# Patient Record
Sex: FEMALE | Race: WHITE | NOT HISPANIC OR LATINO | Employment: OTHER | URBAN - METROPOLITAN AREA
[De-identification: names, ages, dates, MRNs, and addresses within clinical notes are randomized per-mention and may not be internally consistent; named-entity substitution may affect disease eponyms.]

---

## 2017-06-05 ENCOUNTER — COMMUNICATION - HEALTHEAST (OUTPATIENT)
Dept: HEALTH INFORMATION MANAGEMENT | Facility: CLINIC | Age: 67
End: 2017-06-05

## 2017-08-01 ENCOUNTER — TRANSFERRED RECORDS (OUTPATIENT)
Dept: HEALTH INFORMATION MANAGEMENT | Facility: CLINIC | Age: 67
End: 2017-08-01

## 2017-12-06 ENCOUNTER — TRANSFERRED RECORDS (OUTPATIENT)
Dept: HEALTH INFORMATION MANAGEMENT | Facility: CLINIC | Age: 67
End: 2017-12-06

## 2018-04-23 ENCOUNTER — TRANSFERRED RECORDS (OUTPATIENT)
Dept: HEALTH INFORMATION MANAGEMENT | Facility: CLINIC | Age: 68
End: 2018-04-23

## 2018-06-14 LAB
ALT SERPL-CCNC: 16 IU/L (ref 8–45)
AST SERPL-CCNC: 19 IU/L (ref 2–40)

## 2018-08-20 ENCOUNTER — TRANSFERRED RECORDS (OUTPATIENT)
Dept: MULTI SPECIALTY CLINIC | Facility: CLINIC | Age: 68
End: 2018-08-20

## 2018-08-20 LAB
CHOLEST SERPL-MCNC: 278 MG/DL (ref 100–199)
CREAT SERPL-MCNC: 0.82 MG/DL (ref 0.57–1.11)
GFR SERPL CREATININE-BSD FRML MDRD: >60 ML/MIN/1.73M2
GLUCOSE SERPL-MCNC: 92 MG/DL (ref 65–100)
HDLC SERPL-MCNC: 71 MG/DL
LDLC SERPL CALC-MCNC: 185 MG/DL
NONHDLC SERPL-MCNC: 207 MG/DL
POTASSIUM SERPL-SCNC: 3.8 MMOL/L (ref 3.5–5)
TRIGL SERPL-MCNC: 110 MG/DL

## 2018-12-10 ENCOUNTER — TRANSFERRED RECORDS (OUTPATIENT)
Dept: HEALTH INFORMATION MANAGEMENT | Facility: CLINIC | Age: 68
End: 2018-12-10

## 2019-01-14 ENCOUNTER — TRANSFERRED RECORDS (OUTPATIENT)
Dept: HEALTH INFORMATION MANAGEMENT | Facility: CLINIC | Age: 69
End: 2019-01-14

## 2019-09-10 ENCOUNTER — OFFICE VISIT (OUTPATIENT)
Dept: FAMILY MEDICINE | Facility: CLINIC | Age: 69
End: 2019-09-10
Payer: MEDICARE

## 2019-09-10 ENCOUNTER — TELEPHONE (OUTPATIENT)
Dept: INTERNAL MEDICINE | Facility: CLINIC | Age: 69
End: 2019-09-10

## 2019-09-10 VITALS
WEIGHT: 224 LBS | HEIGHT: 65 IN | DIASTOLIC BLOOD PRESSURE: 79 MMHG | BODY MASS INDEX: 37.32 KG/M2 | OXYGEN SATURATION: 98 % | RESPIRATION RATE: 12 BRPM | TEMPERATURE: 97.5 F | SYSTOLIC BLOOD PRESSURE: 148 MMHG | HEART RATE: 70 BPM

## 2019-09-10 DIAGNOSIS — Z00.00 ENCOUNTER FOR MEDICARE ANNUAL WELLNESS EXAM: Primary | ICD-10-CM

## 2019-09-10 DIAGNOSIS — I10 ESSENTIAL HYPERTENSION: ICD-10-CM

## 2019-09-10 DIAGNOSIS — E78.5 HYPERLIPIDEMIA LDL GOAL <130: ICD-10-CM

## 2019-09-10 DIAGNOSIS — E66.01 MORBID OBESITY (H): ICD-10-CM

## 2019-09-10 PROBLEM — G60.8 PERIPHERAL SENSORY NEUROPATHY: Status: ACTIVE | Noted: 2017-06-28

## 2019-09-10 PROBLEM — E66.9 NON MORBID OBESITY: Status: ACTIVE | Noted: 2017-06-28

## 2019-09-10 PROBLEM — E66.9 NON MORBID OBESITY: Status: RESOLVED | Noted: 2017-06-28 | Resolved: 2019-09-10

## 2019-09-10 LAB
ANION GAP SERPL CALCULATED.3IONS-SCNC: 6 MMOL/L (ref 3–14)
BUN SERPL-MCNC: 15 MG/DL (ref 7–30)
CALCIUM SERPL-MCNC: 8.7 MG/DL (ref 8.5–10.1)
CHLORIDE SERPL-SCNC: 107 MMOL/L (ref 94–109)
CHOLEST SERPL-MCNC: 261 MG/DL
CO2 SERPL-SCNC: 28 MMOL/L (ref 20–32)
CREAT SERPL-MCNC: 0.78 MG/DL (ref 0.52–1.04)
GFR SERPL CREATININE-BSD FRML MDRD: 78 ML/MIN/{1.73_M2}
GLUCOSE SERPL-MCNC: 89 MG/DL (ref 70–99)
HDLC SERPL-MCNC: 82 MG/DL
LDLC SERPL CALC-MCNC: 158 MG/DL
NONHDLC SERPL-MCNC: 179 MG/DL
POTASSIUM SERPL-SCNC: 3.6 MMOL/L (ref 3.4–5.3)
SODIUM SERPL-SCNC: 141 MMOL/L (ref 133–144)
TRIGL SERPL-MCNC: 104 MG/DL

## 2019-09-10 PROCEDURE — 80061 LIPID PANEL: CPT | Performed by: INTERNAL MEDICINE

## 2019-09-10 PROCEDURE — 99397 PER PM REEVAL EST PAT 65+ YR: CPT | Performed by: INTERNAL MEDICINE

## 2019-09-10 PROCEDURE — 99213 OFFICE O/P EST LOW 20 MIN: CPT | Mod: 25 | Performed by: INTERNAL MEDICINE

## 2019-09-10 PROCEDURE — 80048 BASIC METABOLIC PNL TOTAL CA: CPT | Performed by: INTERNAL MEDICINE

## 2019-09-10 PROCEDURE — 36415 COLL VENOUS BLD VENIPUNCTURE: CPT | Performed by: INTERNAL MEDICINE

## 2019-09-10 RX ORDER — LOSARTAN POTASSIUM AND HYDROCHLOROTHIAZIDE 12.5; 5 MG/1; MG/1
1 TABLET ORAL DAILY
Qty: 90 TABLET | Refills: 3 | Status: SHIPPED | OUTPATIENT
Start: 2019-09-10 | End: 2020-05-21

## 2019-09-10 RX ORDER — LOSARTAN POTASSIUM AND HYDROCHLOROTHIAZIDE 12.5; 5 MG/1; MG/1
1 TABLET ORAL DAILY
COMMUNITY
Start: 2018-08-20 | End: 2019-09-10

## 2019-09-10 RX ORDER — ROSUVASTATIN CALCIUM 5 MG/1
5 TABLET, COATED ORAL DAILY
Qty: 90 TABLET | Refills: 3 | Status: SHIPPED | OUTPATIENT
Start: 2019-09-10 | End: 2020-08-14

## 2019-09-10 RX ORDER — CHOLECALCIFEROL (VITAMIN D3) 50 MCG
2000 TABLET ORAL DAILY
COMMUNITY

## 2019-09-10 SDOH — HEALTH STABILITY: MENTAL HEALTH: HOW OFTEN DO YOU HAVE A DRINK CONTAINING ALCOHOL?: NEVER

## 2019-09-10 ASSESSMENT — ACTIVITIES OF DAILY LIVING (ADL): CURRENT_FUNCTION: NO ASSISTANCE NEEDED

## 2019-09-10 ASSESSMENT — MIFFLIN-ST. JEOR: SCORE: 1552.55

## 2019-09-10 NOTE — PATIENT INSTRUCTIONS
1. Blood work today  2. Monitor blood pressure at home.  If remains elevated, may need to increase the med.  3. Recommend to start crestor 5 mg once daily    Ideal blood pressure is consistently less than 140.  Best way is take 2+ hours after you have taken your blood pressure medication, and not while drinking coffee, alcohol, using advil, sudafed, etc.  These can raise your blood pressure.   Sit quietly for several min with feet flat on ground.  With arm at heart level, take blood pressure, then again in 1 min.  Average the 2 readings and record this.  Can come back to see RN for blood pressure check.  Blood Pressure Log    Patient Education   Personalized Prevention Plan  You are due for the preventive services outlined below.  Your care team is available to assist you in scheduling these services.  If you have already completed any of these items, please share that information with your care team to update in your medical record.  Health Maintenance Due   Topic Date Due     Hepatitis C Screening  1950     Colonscopy  06/16/1961     Cholesterol Lab  06/16/1996     Annual Wellness Visit  06/16/2016     FALL RISK ASSESSMENT  06/16/2016     Diptheria Tetanus Pertussis (DTAP/TDAP/TD) Vaccine (2 - Td) 12/17/2018     PHQ-2  01/01/2019     Flu Vaccine (1) 09/01/2019

## 2019-09-10 NOTE — TELEPHONE ENCOUNTER
Please abstract the following data from this visit with this patient into the appropriate field in Epic:    Tests that can be patient reported without a hard copy:    Colonoscopy done on this date: 3/16/2015 (approximately), by this group: [facility], results were Care-Everywhere.  and Mammogram done on this date: 12/10/2018 (approximately), by this group: [facility], results were see care-everywhere.     Other Tests found in the patient's chart through Chart Review/Care Everywhere:    LDL done by this group [facility] on this date: 8/20/2018    Note to Abstraction: If this section is blank, no results were found via Chart Review/Care Everywhere.

## 2019-09-10 NOTE — PROGRESS NOTES
"Subjective     Kelly Mckeon is a 69 year old female who presents to clinic today for the following health issues:      Annual Wellness Visit    Are you in the first 12 months of your Medicare Part B coverage?  No      Mental Health:    In general, how would you rate your overall mental or emotional health? good  PHQ-2 Score:      Do you feel safe in your environment? Yes    Do you have a Health Care Directive? Yes: Patient states has Advance Directive and will bring in a copy to clinic.    Fall risk:  Fall Risk Assessment not completed.    Cognitive Screenin) Repeat 3 items (Leader, Season, Table)    2) Clock draw: NORMAL  3) 3 item recall: Recalls 3 objects  Results: 3 items recalled: COGNITIVE IMPAIRMENT LESS LIKELY    Mini-CogTM Copyright S Nicholas. Licensed by the author for use in [employer]; reprinted with permission (estelle@[facility]). All rights reserved.      Current providers sharing in care for this patient include:   Patient Care Team:  Ayleen Castro, DO as PCP - General (Internal Medicine)        Do you have sleep apnea, excessive snoring or daytime drowsiness?: no    Healthy Habits:    In general, how would you rate your overall health?  Very good    Frequency of exercise:  2-3 days/week    Duration of exercise:  30-45 minutes    Do you usually eat at least 4 servings of fruit and vegetables a day, include whole grains    & fiber and avoid regularly eating high fat or \"junk\" foods?  Yes    Taking medications regularly:  Yes    Barriers to taking medications:  None    Medication side effects:  None    Ability to successfully perform activities of daily living:  No assistance needed    Home Safety:  No safety concerns identified    Hearing Impairment:  No hearing concerns    In the past 6 months, have you been bothered by leaking of urine?  No    In general, how would you rate your overall mental or emotional health?  Excellent      PHQ-2 Total Score:    Additional concerns " "today:  No       Refill:Losartan     HL: 10 year risk of CV is 13.8%.  , , HDL 71, .  Intolerant to pravastatin.    Quality: Mammogram 2018, Colonoscopy 2015 See care-Everywhere    Cancers: Never              Current Outpatient Medications   Medication Sig Dispense Refill     losartan-hydrochlorothiazide (HYZAAR) 50-12.5 MG tablet Take 1 tablet by mouth daily 90 tablet 3     rosuvastatin (CRESTOR) 5 MG tablet Take 1 tablet (5 mg) by mouth daily 90 tablet 3     vitamin D3 (CHOLECALCIFEROL) 2000 units (50 mcg) tablet Take 2,000 Units by mouth daily           Reviewed and updated as needed this visit by Provider         Review of Systems   ROS COMP: Constitutional, HEENT, cardiovascular, pulmonary, GI, , musculoskeletal, neuro, skin, endocrine and psych systems are negative, except as otherwise noted.      Objective    BP (!) 146/90   Pulse 70   Temp 97.5  F (36.4  C) (Tympanic)   Resp 12   Ht 1.66 m (5' 5.35\")   Wt 101.6 kg (224 lb)   SpO2 98%   Breastfeeding? No   BMI 36.87 kg/m    Body mass index is 36.87 kg/m .  Physical Exam   GENERAL: healthy, alert and no distress  EYES: Eyes grossly normal to inspection, PERRL and conjunctivae and sclerae normal  HENT: ear canals and TM's normal, nose and mouth without ulcers or lesions  NECK: no adenopathy, no asymmetry, masses, or scars and thyroid normal to palpation  RESP: lungs clear to auscultation - no rales, rhonchi or wheezes  CV: regular rate and rhythm, normal S1 S2, no S3 or S4, no murmur, click or rub, no peripheral edema and peripheral pulses strong  ABDOMEN: soft, nontender, no hepatosplenomegaly, no masses and bowel sounds normal  MS: no gross musculoskeletal defects noted, no edema  SKIN: no suspicious lesions or rashes  NEURO: Normal strength and tone, mentation intact and speech normal  PSYCH: mentation appears normal, affect normal/bright          Assessment & Plan     1. Encounter for Medicare annual wellness exam    2. " Hyperlipidemia LDL goal <130 -did a 10-year CV risk calculator and her risk is elevated.  Recommended a statin.  She is agreeable to try a different low-dose statin.  Advised to continue working on diet and exercise which she plans to do.  - Lipid panel reflex to direct LDL Fasting  - rosuvastatin (CRESTOR) 5 MG tablet; Take 1 tablet (5 mg) by mouth daily  Dispense: 90 tablet; Refill: 3  - OFFICE/OUTPT VISIT,EST,LEVL III    3. Essential hypertension-not well controlled.  She reports it is better controlled at home.  Advised to monitor at home, instructions given.  RN visit in 2 to 4 weeks.  If blood pressure remains uncontrolled.  Would increase the dose of her current blood pressure medicine.  - losartan-hydrochlorothiazide (HYZAAR) 50-12.5 MG tablet; Take 1 tablet by mouth daily  Dispense: 90 tablet; Refill: 3  - Basic metabolic panel  - OFFICE/OUTPT VISIT,EST,LEVL III    4. Morbid obesity (H) -discussed diet exercise          Return in about 53 weeks (around 9/15/2020) for Annual Wellness Visit.    Ayleen Castro,   [facility]

## 2019-09-11 ENCOUNTER — ALLIED HEALTH/NURSE VISIT (OUTPATIENT)
Dept: FAMILY MEDICINE | Facility: CLINIC | Age: 69
End: 2019-09-11
Payer: MEDICARE

## 2019-09-11 DIAGNOSIS — Z23 NEED FOR PROPHYLACTIC VACCINATION AND INOCULATION AGAINST INFLUENZA: ICD-10-CM

## 2019-09-11 DIAGNOSIS — Z23 NEED FOR VACCINATION: Primary | ICD-10-CM

## 2019-09-11 PROCEDURE — 90662 IIV NO PRSV INCREASED AG IM: CPT

## 2019-09-11 PROCEDURE — 90715 TDAP VACCINE 7 YRS/> IM: CPT

## 2019-09-11 PROCEDURE — 90471 IMMUNIZATION ADMIN: CPT

## 2019-09-11 PROCEDURE — G0008 ADMIN INFLUENZA VIRUS VAC: HCPCS | Mod: 59

## 2019-09-11 PROCEDURE — 99207 ZZC NO CHARGE NURSE ONLY: CPT

## 2019-09-11 NOTE — TELEPHONE ENCOUNTER
Thank you for your message below. This Mammogram has already been abstracted, see the Imaging tab in Epic. I abstracted everything else you requested. Let us know if you have any questions about this.  Thank you,  Eloise, Abstracting Team

## 2019-09-16 ENCOUNTER — AMBULATORY - HEALTHEAST (OUTPATIENT)
Dept: OTHER | Facility: CLINIC | Age: 69
End: 2019-09-16

## 2019-09-16 ENCOUNTER — DOCUMENTATION ONLY (OUTPATIENT)
Dept: OTHER | Facility: CLINIC | Age: 69
End: 2019-09-16

## 2019-11-06 ENCOUNTER — HEALTH MAINTENANCE LETTER (OUTPATIENT)
Age: 69
End: 2019-11-06

## 2019-11-07 ENCOUNTER — MYC MEDICAL ADVICE (OUTPATIENT)
Dept: FAMILY MEDICINE | Facility: CLINIC | Age: 69
End: 2019-11-07

## 2019-11-11 ENCOUNTER — DOCUMENTATION ONLY (OUTPATIENT)
Dept: INTERNAL MEDICINE | Facility: CLINIC | Age: 69
End: 2019-11-11

## 2019-11-11 DIAGNOSIS — Z11.59 ENCOUNTER FOR HEPATITIS C SCREENING TEST FOR LOW RISK PATIENT: Primary | ICD-10-CM

## 2019-11-12 DIAGNOSIS — Z11.59 ENCOUNTER FOR HEPATITIS C SCREENING TEST FOR LOW RISK PATIENT: ICD-10-CM

## 2019-11-12 LAB — HCV AB SERPL QL IA: NONREACTIVE

## 2019-11-12 PROCEDURE — 36415 COLL VENOUS BLD VENIPUNCTURE: CPT | Performed by: INTERNAL MEDICINE

## 2019-11-12 PROCEDURE — G0472 HEP C SCREEN HIGH RISK/OTHER: HCPCS | Performed by: INTERNAL MEDICINE

## 2019-12-23 ENCOUNTER — TRANSFERRED RECORDS (OUTPATIENT)
Dept: HEALTH INFORMATION MANAGEMENT | Facility: CLINIC | Age: 69
End: 2019-12-23

## 2020-01-22 ENCOUNTER — TRANSFERRED RECORDS (OUTPATIENT)
Dept: HEALTH INFORMATION MANAGEMENT | Facility: CLINIC | Age: 70
End: 2020-01-22

## 2020-02-19 ENCOUNTER — TELEPHONE (OUTPATIENT)
Dept: INTERNAL MEDICINE | Facility: CLINIC | Age: 70
End: 2020-02-19

## 2020-02-19 NOTE — TELEPHONE ENCOUNTER
Panel Management Review      Patient has the following on her problem list:     Hypertension   Last three blood pressure readings:  BP Readings from Last 3 Encounters:   09/10/19 (!) 148/79     Blood pressure: FAILED    HTN Guidelines:  Less than 140/90      Action needed:   Patient needs nurse only appointment.    Type of outreach:    Sent K[employer] message.    Questions for provider review:    None                                                                                                                                    Maddison Daily CMA (JOSUE)   (aka: Aria Daily)       Chart routed to Care Team .

## 2020-05-20 ENCOUNTER — TELEPHONE (OUTPATIENT)
Dept: INTERNAL MEDICINE | Facility: CLINIC | Age: 70
End: 2020-05-20

## 2020-05-20 DIAGNOSIS — I10 ESSENTIAL HYPERTENSION: Primary | ICD-10-CM

## 2020-05-20 NOTE — TELEPHONE ENCOUNTER
Reason for Call:  Medication or medication refill:    Do you use a [facility]?  Name of the pharmacy and phone number for the current request:  [facility] - [place] [phone]    Name of the medication requested: Hyzaar    Other request: Hyzaar is on back order, need rx for Losartan and HCTZ separately     Can we leave a detailed message on this number? YES    Phone number patient can be reached at: Other phone number:  [facility], [phone]    Best Time: Any    Call taken on 5/20/2020 at 2:35 PM by Hafsa Thorpe

## 2020-05-21 RX ORDER — HYDROCHLOROTHIAZIDE 12.5 MG/1
12.5 TABLET ORAL DAILY
Qty: 90 TABLET | Refills: 3 | Status: SHIPPED | OUTPATIENT
Start: 2020-05-21 | End: 2020-09-08

## 2020-05-21 RX ORDER — LOSARTAN POTASSIUM 50 MG/1
50 TABLET ORAL DAILY
Qty: 90 TABLET | Refills: 3 | Status: SHIPPED | OUTPATIENT
Start: 2020-05-21 | End: 2020-09-08

## 2020-08-14 DIAGNOSIS — E78.5 HYPERLIPIDEMIA LDL GOAL <130: ICD-10-CM

## 2020-08-14 RX ORDER — ROSUVASTATIN CALCIUM 5 MG/1
5 TABLET, COATED ORAL DAILY
Qty: 90 TABLET | Refills: 0 | Status: SHIPPED | OUTPATIENT
Start: 2020-08-14 | End: 2020-09-08

## 2020-08-14 NOTE — TELEPHONE ENCOUNTER
"Rosuvastatin Calcium Oral Tablet 5 MG   Requested Prescriptions   Pending Prescriptions Disp Refills     rosuvastatin (CRESTOR) 5 MG tablet [Pharmacy Med Name: Rosuvastatin Calcium Oral Tablet 5 MG] 90 tablet 0     Sig: Take 1 tablet (5 mg) by mouth daily       Statins Protocol Passed - 8/14/2020 10:44 AM        Passed - LDL on file in past 12 months     Recent Labs   Lab Test 09/10/19  0851   *             Passed - No abnormal creatine kinase in past 12 months     No lab results found.             Passed - Recent (12 mo) or future (30 days) visit within the authorizing provider's specialty     Patient has had an office visit with the authorizing provider or a provider within the authorizing providers department within the previous 12 mos or has a future within next 30 days. See \"Patient Info\" tab in inbasket, or \"Choose Columns\" in Meds & Orders section of the refill encounter.              Passed - Medication is active on med list        Passed - Patient is age 18 or older        Passed - No active pregnancy on record        Passed - No positive pregnancy test in past 12 months             "

## 2020-08-31 ENCOUNTER — TELEPHONE (OUTPATIENT)
Dept: INTERNAL MEDICINE | Facility: CLINIC | Age: 70
End: 2020-08-31

## 2020-08-31 DIAGNOSIS — I10 ESSENTIAL HYPERTENSION: Primary | ICD-10-CM

## 2020-08-31 DIAGNOSIS — E78.5 HYPERLIPIDEMIA LDL GOAL <130: ICD-10-CM

## 2020-08-31 NOTE — TELEPHONE ENCOUNTER
Dr. Castro,    Patient wanting labs ordered prior to physical.  I have pended a few for your consideration. Alexa VILLALBA RN

## 2020-08-31 NOTE — TELEPHONE ENCOUNTER
Reason for call:  Other   Patient called regarding (reason for call): call back  Additional comments: Patient requesting an order for lab work before her appt on 9/8/20    Phone number to reach patient:  Home number on file [phone] (home)    Best Time:  Anytime    Can we leave a detailed message on this number?  YES    Travel screening: Not Applicable

## 2020-09-06 DIAGNOSIS — I10 ESSENTIAL HYPERTENSION: ICD-10-CM

## 2020-09-06 DIAGNOSIS — E78.5 HYPERLIPIDEMIA LDL GOAL <130: ICD-10-CM

## 2020-09-06 LAB
ANION GAP SERPL CALCULATED.3IONS-SCNC: 3 MMOL/L (ref 3–14)
BUN SERPL-MCNC: 18 MG/DL (ref 7–30)
CALCIUM SERPL-MCNC: 8.6 MG/DL (ref 8.5–10.1)
CHLORIDE SERPL-SCNC: 107 MMOL/L (ref 94–109)
CHOLEST SERPL-MCNC: 175 MG/DL
CO2 SERPL-SCNC: 29 MMOL/L (ref 20–32)
CREAT SERPL-MCNC: 0.84 MG/DL (ref 0.52–1.04)
GFR SERPL CREATININE-BSD FRML MDRD: 71 ML/MIN/{1.73_M2}
GLUCOSE SERPL-MCNC: 81 MG/DL (ref 70–99)
HDLC SERPL-MCNC: 75 MG/DL
LDLC SERPL CALC-MCNC: 85 MG/DL
NONHDLC SERPL-MCNC: 100 MG/DL
POTASSIUM SERPL-SCNC: 3.8 MMOL/L (ref 3.4–5.3)
SODIUM SERPL-SCNC: 139 MMOL/L (ref 133–144)
TRIGL SERPL-MCNC: 74 MG/DL

## 2020-09-06 PROCEDURE — 80061 LIPID PANEL: CPT | Performed by: INTERNAL MEDICINE

## 2020-09-06 PROCEDURE — 36415 COLL VENOUS BLD VENIPUNCTURE: CPT | Performed by: INTERNAL MEDICINE

## 2020-09-06 PROCEDURE — 80048 BASIC METABOLIC PNL TOTAL CA: CPT | Performed by: INTERNAL MEDICINE

## 2020-09-08 ENCOUNTER — OFFICE VISIT (OUTPATIENT)
Dept: FAMILY MEDICINE | Facility: CLINIC | Age: 70
End: 2020-09-08
Payer: MEDICARE

## 2020-09-08 ENCOUNTER — MYC MEDICAL ADVICE (OUTPATIENT)
Dept: INTERNAL MEDICINE | Facility: CLINIC | Age: 70
End: 2020-09-08

## 2020-09-08 VITALS
OXYGEN SATURATION: 98 % | WEIGHT: 219 LBS | HEART RATE: 99 BPM | DIASTOLIC BLOOD PRESSURE: 84 MMHG | HEIGHT: 66 IN | SYSTOLIC BLOOD PRESSURE: 136 MMHG | RESPIRATION RATE: 16 BRPM | BODY MASS INDEX: 35.2 KG/M2 | TEMPERATURE: 97.3 F

## 2020-09-08 DIAGNOSIS — E78.5 HYPERLIPIDEMIA LDL GOAL <130: ICD-10-CM

## 2020-09-08 DIAGNOSIS — Z86.0100 PERSONAL HISTORY OF COLONIC POLYPS: ICD-10-CM

## 2020-09-08 DIAGNOSIS — Z23 NEED FOR PROPHYLACTIC VACCINATION AND INOCULATION AGAINST INFLUENZA: ICD-10-CM

## 2020-09-08 DIAGNOSIS — Z00.00 ENCOUNTER FOR MEDICARE ANNUAL WELLNESS EXAM: Primary | ICD-10-CM

## 2020-09-08 DIAGNOSIS — Z12.11 SCREEN FOR COLON CANCER: ICD-10-CM

## 2020-09-08 DIAGNOSIS — I10 ESSENTIAL HYPERTENSION: ICD-10-CM

## 2020-09-08 PROCEDURE — G0008 ADMIN INFLUENZA VIRUS VAC: HCPCS | Performed by: INTERNAL MEDICINE

## 2020-09-08 PROCEDURE — 90662 IIV NO PRSV INCREASED AG IM: CPT | Performed by: INTERNAL MEDICINE

## 2020-09-08 PROCEDURE — 99213 OFFICE O/P EST LOW 20 MIN: CPT | Mod: 25 | Performed by: INTERNAL MEDICINE

## 2020-09-08 PROCEDURE — G0439 PPPS, SUBSEQ VISIT: HCPCS | Performed by: INTERNAL MEDICINE

## 2020-09-08 RX ORDER — HYDROCHLOROTHIAZIDE 12.5 MG/1
12.5 TABLET ORAL DAILY
Qty: 90 TABLET | Refills: 3 | Status: SHIPPED | OUTPATIENT
Start: 2020-09-08 | End: 2021-08-26

## 2020-09-08 RX ORDER — ROSUVASTATIN CALCIUM 5 MG/1
5 TABLET, COATED ORAL DAILY
Qty: 90 TABLET | Refills: 3 | Status: SHIPPED | OUTPATIENT
Start: 2020-09-08 | End: 2021-08-31

## 2020-09-08 RX ORDER — LOSARTAN POTASSIUM 50 MG/1
50 TABLET ORAL DAILY
Qty: 90 TABLET | Refills: 3 | Status: SHIPPED | OUTPATIENT
Start: 2020-09-08 | End: 2021-08-26

## 2020-09-08 ASSESSMENT — MIFFLIN-ST. JEOR: SCORE: 1527.19

## 2020-09-08 ASSESSMENT — ACTIVITIES OF DAILY LIVING (ADL): CURRENT_FUNCTION: NO ASSISTANCE NEEDED

## 2020-09-08 NOTE — PROGRESS NOTES
"SUBJECTIVE:   Kelly Mckeon is a 70 year old female who presents for Preventive Visit.    Flu shot: today    Are you in the first 12 months of your Medicare coverage?  No    Healthy Habits:     In general, how would you rate your overall health?  Very good    Frequency of exercise:  2-3 days/week    Duration of exercise:  15-30 minutes    Do you usually eat at least 4 servings of fruit and vegetables a day, include whole grains    & fiber and avoid regularly eating high fat or \"junk\" foods?  Yes    Taking medications regularly:  Yes    Barriers to taking medications:  None    Medication side effects:  None    Ability to successfully perform activities of daily living:  No assistance needed    Home Safety:  No safety concerns identified    Hearing Impairment:  No hearing concerns    In the past 6 months, have you been bothered by leaking of urine?  No    In general, how would you rate your overall mental or emotional health?  Excellent      PHQ-2 Total Score: 0    Additional concerns today:  Yes       Flu shot: today    Vaccine question: Cov-19 vaccine :     Colonoscopy 2015 0 she wants to do FIT test instead  Findings:       There were no polyps on examination today.       The perianal exam findings include non-thrombosed external   hemorrhoids        and non-thrombosed internal hemorrhoids (Grade I).       Non-bleeding external and internal hemorrhoids were found   during        retroflexion and were mild.       The exam was otherwise without abnormality on direct and   retroflexion        views.                                                                                     Impressions/Post-Op Diagnosis:       - Non-thrombosed external hemorrhoids, non-thrombosed   internal        hemorrhoids and internal hemorrhoids (Grade I) found on   perianal exam        and on retroflexion.       - The examination was otherwise normal on direct and   retroflexion " Received new orders for midline stat.Notified IR PIV.   views.                                                                                     Recommendation:       - Discharge patient to home (ambulatory).       - Repeat colonoscopy in 5 years for surveillance.       - High fiber diet.                                Hyperlipidemia Follow-Up      Are you regularly taking any medication or supplement to lower your cholesterol?   Yes- Rosuvastatin 5 mg    Are you having muscle aches or other side effects that you think could be caused by your cholesterol lowering medication?  No.  Possibly very mild muscle aches, not severe,not interfering/bothersome.    Hypertension Follow-up      Do you check your blood pressure regularly outside of the clinic? Yes     Are you following a low salt diet? Yes    Are your blood pressures ever more than 140 on the top number (systolic) OR more   than 90 on the bottom number (diastolic), for example 140/90? No      Do you feel safe in your environment? Yes    Have you ever done Advance Care Planning? (For example, a Health Directive, POLST, or a discussion with a medical provider or your loved ones about your wishes): Yes, advance care planning is on file.      Fall risk  Fallen 2 or more times in the past year?: No  Any fall with injury in the past year?: No    Cognitive Screening   1) Repeat 3 items (Leader, Season, Table)  All 3 words repeated back.  2) Clock draw: NORMAL  3) 3 item recall: Recalls 3 objects  Results: 3 items recalled: COGNITIVE IMPAIRMENT LESS LIKELY    Mini-CogTM Copyright S Nicholas. Licensed by the author for use in [facility]; reprinted with permission (estelle@.[email or web address]). All rights reserved.      Do you have sleep apnea, excessive snoring or daytime drowsiness?: no    Reviewed and updated as needed this visit by clinical staff  Tobacco  Allergies  Meds  Med Hx  Surg Hx  Fam Hx  Soc Hx        Reviewed and updated as needed this visit by Provider        Social History     Tobacco Use     Smoking  "status: Never Smoker     Smokeless tobacco: Never Used   Substance Use Topics     Alcohol use: Never     Frequency: Never     If you drink alcohol do you typically have >3 drinks per day or >7 drinks per week? No    No flowsheet data found.            Current providers sharing in care for this patient include:   Patient Care Team:  Ayleen Castro DO as PCP - General (Internal Medicine)  Ayleen Castro DO as Assigned PCP    The following health maintenance items are reviewed in Epic and correct as of today:  Health Maintenance   Topic Date Due     FALL RISK ASSESSMENT  06/16/2016     PHQ-2  01/01/2020     INFLUENZA VACCINE (1) 09/01/2020     MEDICARE ANNUAL WELLNESS VISIT  09/10/2020     MAMMO SCREENING  12/23/2021     ADVANCE CARE PLANNING  09/16/2024     COLORECTAL CANCER SCREENING  03/16/2025     LIPID  09/06/2025     DTAP/TDAP/TD IMMUNIZATION (4 - Td) 09/11/2029     DEXA  Completed     HEPATITIS C SCREENING  Completed     PNEUMOCOCCAL IMMUNIZATION 65+ LOW/MEDIUM RISK  Completed     ZOSTER IMMUNIZATION  Completed     IPV IMMUNIZATION  Aged Out     MENINGITIS IMMUNIZATION  Aged Out     HEPATITIS B IMMUNIZATION  Aged Out     Current Outpatient Medications   Medication Sig Dispense Refill     hydrochlorothiazide (HYDRODIURIL) 12.5 MG tablet Take 1 tablet (12.5 mg) by mouth daily 90 tablet 3     losartan (COZAAR) 50 MG tablet Take 1 tablet (50 mg) by mouth daily 90 tablet 3     rosuvastatin (CRESTOR) 5 MG tablet Take 1 tablet (5 mg) by mouth daily 90 tablet 3     vitamin D3 (CHOLECALCIFEROL) 2000 units (50 mcg) tablet Take 2,000 Units by mouth daily           Review of Systems  Constitutional, HEENT, cardiovascular, pulmonary, GI, , musculoskeletal, neuro, skin, endocrine and psych systems are negative, except as otherwise noted.    OBJECTIVE:   /84   Pulse 99   Temp 97.3  F (36.3  C) (Tympanic)   Resp 16   Ht 1.664 m (5' 5.5\")   Wt 99.3 kg (219 lb)   SpO2 98%   Breastfeeding No   BMI " "35.89 kg/m   Estimated body mass index is 35.89 kg/m  as calculated from the following:    Height as of this encounter: 1.664 m (5' 5.5\").    Weight as of this encounter: 99.3 kg (219 lb).  Physical Exam  GENERAL: healthy, alert and no distress  EYES: Eyes grossly normal to inspection, PERRL and conjunctivae and sclerae normal  HENT: ear canals and TM's normal, nose and mouth without ulcers or lesions  NECK: no adenopathy, no asymmetry, masses, or scars and thyroid normal to palpation  RESP: lungs clear to auscultation - no rales, rhonchi or wheezes  CV: regular rate and rhythm, normal S1 S2, no S3 or S4, no murmur, click or rub, no peripheral edema and peripheral pulses strong  ABDOMEN: soft, nontender, no hepatosplenomegaly, no masses and bowel sounds normal  MS: no gross musculoskeletal defects noted, no edema  SKIN: no suspicious lesions or rashes  NEURO: Normal strength and tone, mentation intact and speech normal  PSYCH: mentation appears normal, affect normal/bright        ASSESSMENT / PLAN:   1. Encounter for Medicare annual wellness exam    2. Hyperlipidemia LDL goal <130 continue statin. If myalgias worsen, would do trial off med  - rosuvastatin (CRESTOR) 5 MG tablet; Take 1 tablet (5 mg) by mouth daily  Dispense: 90 tablet; Refill: 3  - OFFICE/OUTPT VISIT,EST,LEVL IV    3. Essential hypertension  - stable, refill provided  - hydrochlorothiazide (HYDRODIURIL) 12.5 MG tablet; Take 1 tablet (12.5 mg) by mouth daily  Dispense: 90 tablet; Refill: 3  - losartan (COZAAR) 50 MG tablet; Take 1 tablet (50 mg) by mouth daily  Dispense: 90 tablet; Refill: 3  - OFFICE/OUTPT VISIT,EST,LEVL IV    4. Personal history of colonic polyps - opts for FIT    5. Need for prophylactic vaccination and inoculation against influenza  - FLUZONE HIGH DOSE 65+  [89284]  - ADMIN INFLUENZA (For MEDICARE Patients ONLY) []    6. Screen for colon cancer  - Fecal colorectal cancer screen (FIT); Future    COUNSELING:  Reviewed " "preventive health counseling, as reflected in patient instructions    Estimated body mass index is 35.89 kg/m  as calculated from the following:    Height as of this encounter: 1.664 m (5' 5.5\").    Weight as of this encounter: 99.3 kg (219 lb).    Weight management plan: Discussed healthy diet and exercise guidelines    She reports that she has never smoked. She has never used smokeless tobacco.      Appropriate preventive services were discussed with this patient, including applicable screening as appropriate for cardiovascular disease, diabetes, osteopenia/osteoporosis, and glaucoma.  As appropriate for age/gender, discussed screening for colorectal cancer, prostate cancer, breast cancer, and cervical cancer. Checklist reviewing preventive services available has been given to the patient.    Reviewed patients plan of care and provided an AVS. The Intermediate Care Plan ( asthma action plan, low back pain action plan, and migraine action plan) for Kelly meets the Care Plan requirement. This Care Plan has been established and reviewed with the Patient.    Counseling Resources:  ATP IV Guidelines  Pooled Cohorts Equation Calculator  Breast Cancer Risk Calculator  Breast Cancer: Medication to Reduce Risk  FRAX Risk Assessment  ICSI Preventive Guidelines  Dietary Guidelines for Americans, 2010  USDA's MyPlate  ASA Prophylaxis  Lung CA Screening    Ayleen Castro DO  [facility]    Identified Health Risks:  "

## 2020-09-08 NOTE — PATIENT INSTRUCTIONS
Patient Education   Personalized Prevention Plan  You are due for the preventive services outlined below.  Your care team is available to assist you in scheduling these services.  If you have already completed any of these items, please share that information with your care team to update in your medical record.  Health Maintenance Due   Topic Date Due     FALL RISK ASSESSMENT  06/16/2016     PHQ-2  01/01/2020     Flu Vaccine (1) 09/01/2020     Annual Wellness Visit  09/10/2020             1. COVID experts to trust - Dr. Junaid Francisco (from [facility]), Dr. Pepe Andrews, Dr. Darby, Dr. Mando Bello  2. Refills  3. Stool test for colon cancer

## 2021-01-23 ENCOUNTER — COMMUNICATION - HEALTHEAST (OUTPATIENT)
Dept: SCHEDULING | Facility: CLINIC | Age: 71
End: 2021-01-23

## 2021-02-02 ENCOUNTER — TRANSFERRED RECORDS (OUTPATIENT)
Dept: HEALTH INFORMATION MANAGEMENT | Facility: CLINIC | Age: 71
End: 2021-02-02

## 2021-02-02 ENCOUNTER — OFFICE VISIT (OUTPATIENT)
Dept: FAMILY MEDICINE | Facility: CLINIC | Age: 71
End: 2021-02-02
Payer: COMMERCIAL

## 2021-02-02 ENCOUNTER — MEDICAL CORRESPONDENCE (OUTPATIENT)
Dept: HEALTH INFORMATION MANAGEMENT | Facility: CLINIC | Age: 71
End: 2021-02-02

## 2021-02-02 VITALS
HEART RATE: 74 BPM | SYSTOLIC BLOOD PRESSURE: 128 MMHG | HEIGHT: 65 IN | RESPIRATION RATE: 16 BRPM | OXYGEN SATURATION: 98 % | WEIGHT: 221 LBS | DIASTOLIC BLOOD PRESSURE: 82 MMHG | TEMPERATURE: 98.4 F | BODY MASS INDEX: 36.82 KG/M2

## 2021-02-02 DIAGNOSIS — R00.2 PALPITATIONS: Primary | ICD-10-CM

## 2021-02-02 LAB
ANION GAP SERPL CALCULATED.3IONS-SCNC: 3 MMOL/L (ref 3–14)
BUN SERPL-MCNC: 18 MG/DL (ref 7–30)
CALCIUM SERPL-MCNC: 8.9 MG/DL (ref 8.5–10.1)
CHLORIDE SERPL-SCNC: 107 MMOL/L (ref 94–109)
CO2 SERPL-SCNC: 29 MMOL/L (ref 20–32)
CREAT SERPL-MCNC: 0.9 MG/DL (ref 0.52–1.04)
ERYTHROCYTE [DISTWIDTH] IN BLOOD BY AUTOMATED COUNT: 12.5 % (ref 10–15)
GFR SERPL CREATININE-BSD FRML MDRD: 65 ML/MIN/{1.73_M2}
GLUCOSE SERPL-MCNC: 90 MG/DL (ref 70–99)
HCT VFR BLD AUTO: 40.4 % (ref 35–47)
HGB BLD-MCNC: 13.5 G/DL (ref 11.7–15.7)
MCH RBC QN AUTO: 30.4 PG (ref 26.5–33)
MCHC RBC AUTO-ENTMCNC: 33.4 G/DL (ref 31.5–36.5)
MCV RBC AUTO: 91 FL (ref 78–100)
PLATELET # BLD AUTO: 228 10E9/L (ref 150–450)
POTASSIUM SERPL-SCNC: 3.5 MMOL/L (ref 3.4–5.3)
RBC # BLD AUTO: 4.44 10E12/L (ref 3.8–5.2)
SODIUM SERPL-SCNC: 139 MMOL/L (ref 133–144)
TSH SERPL DL<=0.005 MIU/L-ACNC: 0.76 MU/L (ref 0.4–4)
WBC # BLD AUTO: 6.9 10E9/L (ref 4–11)

## 2021-02-02 PROCEDURE — 99214 OFFICE O/P EST MOD 30 MIN: CPT | Performed by: INTERNAL MEDICINE

## 2021-02-02 PROCEDURE — 93000 ELECTROCARDIOGRAM COMPLETE: CPT | Performed by: INTERNAL MEDICINE

## 2021-02-02 PROCEDURE — 80048 BASIC METABOLIC PNL TOTAL CA: CPT | Performed by: INTERNAL MEDICINE

## 2021-02-02 PROCEDURE — 84443 ASSAY THYROID STIM HORMONE: CPT | Performed by: INTERNAL MEDICINE

## 2021-02-02 PROCEDURE — 85027 COMPLETE CBC AUTOMATED: CPT | Performed by: INTERNAL MEDICINE

## 2021-02-02 PROCEDURE — 36415 COLL VENOUS BLD VENIPUNCTURE: CPT | Performed by: INTERNAL MEDICINE

## 2021-02-02 ASSESSMENT — MIFFLIN-ST. JEOR: SCORE: 1521.45

## 2021-02-02 NOTE — PROGRESS NOTES
"  Assessment & Plan   Problem List Items Addressed This Visit     None      Visit Diagnoses     Palpitations    -  Primary    Relevant Orders    TSH with free T4 reflex (Completed)    CBC with platelets (Completed)    Basic metabolic panel (Completed)    EKG 12-lead complete w/read - Clinics (Completed)         Given the rare infrequent episodes, a monitor would be unlikely to capture the event.  Would need a 30-day event monitor.                               Patient Instructions   1. Likely SVT  2. Labs and EKG  3. Can do \"valsalva maneuver\" if you have another episode   Bear down      No follow-ups on file.    Ayleen Castro DO  [facility] is a 70 year old who presents to clinic today for the following health issues     HPI     ADV: on file  PHI: handle    Heart problem: irregular heart beat - when call was 9 pm went through  12 hrs - Pulse 105 - mother has a-fib, palpitations were present 145/90 - had a triage done     Palpitations -    Onset: 1/23/21     Description:                  How long do the palpitations last: 12hrs                 What are you doing when you notice them: nothing unusual - was watching the news when started  Have you passed out: no      Accompanying Signs & Symptoms:   Chest pain: no  Shortness of breath: no  Lightheadedness: no  Excessive sweating (Diaphoresis): no  Fatigue: no        Nausea: no        Changes in weight: no    Precipitating and/or Alleviating factors:    Increase in stress level: no  Any new medications: no  Caffeine use: no  Alcohol/drug use/withdrawl: no    History:         Personal history of heart problems: No         Family history of heart problems: YES- a-fib    Therapies tried and outcome: none     Has noted mild irregularity - 100s, irregular.  On 1/23 it lasted 6+ hours.    Previous episodes of palpitations occurred 1-2 x year and never lasted that long.  Felt chest tightness during episodes.    No further " "episodes since 1/23    Stopped caffeine prior to xmas    From RN triage call on 1/23 \"Pt calling with heart rate concern. Says her heart rate has been irregular and elevated for the past 5 hours @  bpm. Denies shortness of breath, fever, anxiety,chest pain or other discomfort. Says her current B/P is 145/90\"        Usual on that day - went 8 times urination - no pain and no blood , no problem with urination     .  BP Readings from Last 6 Encounters:   02/02/21 128/82   09/08/20 136/84   09/10/19 (!) 148/79     Pt brought her BP readings -= Copy made     Hypertension Follow-up      Do you check your blood pressure regularly outside of the clinic? Yes     Are you following a low salt diet? Yes    Are your blood pressures ever more than 140 on the top number (systolic) OR more   than 90 on the bottom number (diastolic), for example 140/90? No        Musculoskeletal problem/pain  Onset/Duration: about 3 weeks ago  Description  Location: right hand - middle finger  Joint Swelling: YES- somewhat  Redness: no  Pain: YES  Warmth: no  Intensity:  mild  Progression of Symptoms:  same and constant  Accompanying signs and symptoms:   Fevers: no  Numbness/tingling/weakness: YES- weakness and ROM  History  Trauma to the area: no  Recent illness:  no  Previous similar problem: no  Previous evaluation:  no  Precipitating or alleviating factors:  Aggravating factors include: none  Therapies tried and outcome: nothing      Review of Systems   Constitutional, HEENT, cardiovascular, pulmonary, gi and gu systems are negative, except as otherwise noted.      Objective    /82   Pulse 74   Temp 98.4  F (36.9  C) (Tympanic)   Resp 16   Ht 1.64 m (5' 4.57\")   Wt 100.2 kg (221 lb)   SpO2 98%   Breastfeeding No   BMI 37.27 kg/m    Body mass index is 37.27 kg/m .  Physical Exam   GENERAL APPEARANCE: healthy, alert and no distress  NECK: no adenopathy, no asymmetry, masses, or scars and thyroid normal to palpation  RESP: " lungs clear to auscultation - no rales, rhonchi or wheezes  CV: regular rates and rhythm, normal S1 S2, no S3 or S4 and no murmur, click or rub  LYMPHATICS: no leg swelling

## 2021-02-02 NOTE — PATIENT INSTRUCTIONS
"1. Likely SVT  2. Labs and EKG  3. Can do \"valsalva maneuver\" if you have another episode   Bear down  "

## 2021-02-09 DIAGNOSIS — Z12.11 SCREEN FOR COLON CANCER: ICD-10-CM

## 2021-02-09 LAB — HEMOCCULT STL QL IA: NEGATIVE

## 2021-02-09 PROCEDURE — 82274 ASSAY TEST FOR BLOOD FECAL: CPT | Performed by: INTERNAL MEDICINE

## 2021-06-14 NOTE — TELEPHONE ENCOUNTER
Triage Call    Pt calling with heart rate concern.  Says her heart rate has been irregular and elevated for the past 5 hours @  bpm.  Denies shortness of breath, fever, anxiety,chest pain or other discomfort. Says her current B/P is 145/90.    Pt states that her elevated HR is keeping her awake. Also has been urinating frequently.  In one hour she urinated 8 times, about 1/2 cup each time.  Denies pain or burning with urination.      Triaged to disposition of See PCP within 3 days and care advice given.  Transferred to  for assistance to arrange an office visit.    COVID 19 Nurse Triage Plan/Patient Instructions    Please be aware that novel coronavirus (COVID-19) may be circulating in the community. If you develop symptoms such as fever, cough, or SOB or if you have concerns about the presence of another infection including coronavirus (COVID-19), please contact your health care provider or visit [email or web address].     Disposition/Instructions    In-Person Visit with provider recommended. Reference Visit Selection Guide.    Thank you for taking steps to prevent the spread of this virus.  o Limit your contact with others.  o Wear a simple mask to cover your cough.  o Wash your hands well and often.    Resources    [facility]: About COVID-19: [email or web address]    CDC: What to Do If You're Sick: www.cdc.gov/coronavirus/2019-ncov/about/steps-when-sick.html    CDC: Ending Home Isolation: www.cdc.gov/coronavirus/2019-ncov/hcp/disposition-in-home-patients.html     CDC: Caring for Someone: www.cdc.gov/coronavirus/2019-ncov/if-you-are-sick/care-for-someone.html     [facility]: Interim Guidance for Hospital Discharge to Home: www.health.[facility].[place]/diseases/coronavirus/hcp/hospdischarge.pdf    [facility] clinical trials (COVID-19 research studies): [email or web address]/[email or web address]     Below are the COVID-19 hotlines at the [place] Department of Health ([facility]). Interpreters  are available.   o For health questions: Call [phone] or 1-999.245.8889 (7 a.m. to 7 p.m.)  o For questions about schools and childcare: Call 720-692-5130 or 1-316.145.3833 (7 a.m. to 7 p.m.)     Claudine Bello RN        Reason for Disposition    [1] Palpitations AND [2] no improvement after using CARE ADVICE    Additional Information    Negative: Passed out (i.e., lost consciousness, collapsed and was not responding)    Negative: Shock suspected (e.g., cold/pale/clammy skin, too weak to stand, low BP, rapid pulse)    Negative: Difficult to awaken or acting confused (e.g., disoriented, slurred speech)    Negative: Visible sweat on face or sweat dripping down face    Negative: Unable to walk, or can only walk with assistance (e.g., requires support)    Negative: [1] Received SHOCK from implantable cardiac defibrillator AND [2] persisting symptoms (i.e., palpitations, lightheadedness)    Negative: Sounds like a life-threatening emergency to the triager    Negative: Chest pain    Negative: Difficulty breathing    Negative: Dizziness, lightheadedness, or weakness    Negative: [1] Heart beating very rapidly (e.g., > 140 / minute) AND [2] present now  (Exception: during exercise)    Negative: Heart beating very slowly (e.g., < 50 / minute)  (Exception: athlete)    Negative: New or worsened shortness of breath with activity (dyspnea on exertion)    Negative: Patient sounds very sick or weak to the triager    Negative: [1] Heart beating very rapidly (e.g., > 140 / minute) AND [2] not present now  (Exception: during exercise)    Negative: [1] Skipped or extra beat(s) AND [2] increases with exercise or exertion    Negative: [1] Skipped or extra beat(s) AND [2] occurs 4 or more times per minute    Negative: New or worsened ankle swelling    Negative: History of heart disease  (i.e., heart attack, bypass surgery, angina, angioplasty, CHF) (Exception: brief heart beat symptoms that went away and now feels well)     "Negative: Age > 60 years (Exception: brief heart beat symptoms that went away and now feels well)    Negative: Taking water pill (i.e., diuretic) or heart medication (e.g., digoxin)    Negative: Wearing a \"holter monitor\" or \"cardiac event monitor\"    Negative: [1] Received SHOCK from implantable cardiac defibrillator AND [2] now feels well    Negative: History of hyperthyroidism or taking thyroid medication    Negative: Known or suspected substance abuse (e.g., cocaine, alcohol abuse)    Protocols used: HEART RATE AND HEARTBEAT [id number]      "

## 2021-08-23 ENCOUNTER — TELEPHONE (OUTPATIENT)
Dept: FAMILY MEDICINE | Facility: CLINIC | Age: 71
End: 2021-08-23

## 2021-08-23 DIAGNOSIS — I10 ESSENTIAL HYPERTENSION: Primary | ICD-10-CM

## 2021-08-23 NOTE — TELEPHONE ENCOUNTER
Reason for call:  Patient reporting a symptom    Symptom or request: Jordana with [facility]  [place] calling - Pt wants to go back on Losartan hydrochlorothiazide combo pill, now that they are making it again and new Rx needs to be sent to [facility].      Duration (how long have symptoms been present): ongoing    Have you been treated for this before? Yes    Additional comments:     Phone Number pharmacy can be reached at:  Other phone number:  [phone]    Best Time:  any    Can we leave a detailed message on this number:  YES    Call taken on 8/23/2021 at 1:17 PM by Stacy Powers

## 2021-08-23 NOTE — TELEPHONE ENCOUNTER
Last visit 2/20/21.  Will route to PCP.    Call placed to patient. She states she has enough supply until Dr crabtree returns. She is also reminded her annual wellness visit is due early September and she will nandini to schedule.

## 2021-08-25 ENCOUNTER — OFFICE VISIT (OUTPATIENT)
Dept: CARDIOLOGY | Facility: CLINIC | Age: 71
End: 2021-08-25
Payer: COMMERCIAL

## 2021-08-25 VITALS
WEIGHT: 220 LBS | RESPIRATION RATE: 16 BRPM | BODY MASS INDEX: 35.36 KG/M2 | HEIGHT: 66 IN | HEART RATE: 76 BPM | SYSTOLIC BLOOD PRESSURE: 138 MMHG | DIASTOLIC BLOOD PRESSURE: 82 MMHG

## 2021-08-25 DIAGNOSIS — R00.2 PALPITATIONS: Primary | ICD-10-CM

## 2021-08-25 PROCEDURE — 99204 OFFICE O/P NEW MOD 45 MIN: CPT | Performed by: INTERNAL MEDICINE

## 2021-08-25 ASSESSMENT — MIFFLIN-ST. JEOR: SCORE: 1534.66

## 2021-08-25 NOTE — PROGRESS NOTES
"       [facility]   [address], [place] [address]   [email or web address]   OFFICE: [phone]          Impression and Plan     1.  Palpitations.  Etiology not entirely clear, but somewhat suspicious of PVCs.  As noted below, palpitations are somewhat sporadic.  Plan:    30-day 1 patch monitor to further clarify rhythm disturbance.    Echocardiogram to exclude any cryptic structural heart disease.    Follow-up and further recommendations pending test results.    25 minutes spent reviewing prior records (including documentation, laboratory studies, cardiac testing/imaging), interview with patient along with physical exam, planning, and subsequent documentation/crafting of note.       History of Present Illness    Once again I would like to thank you again for asking me to participate in the care of your patient, Kelly Mckeon.  As you know, but to reiterate for my own records, Kelly Mckeon is a 71 year old female with symptoms of palpitations.  Patient describes somewhat sporadic irregularity in her heart beat.  At times it seems a bit fast and somewhat over 100 bpm.  Symptoms seem to recur without any rhyme or reason.  At times she can go a couple weeks if not more without any such palpitations.  She is somewhat disconcerted by the irregular heart rhythm.  Otherwise, no prominent symptoms such as lightheadedness, shortness of breath, or chest pain.  No fevers, chills, or other constitutional symptoms.    Further review of systems is otherwise negative/noncontributory (medical record and 13 point review of systems reviewed as well and pertinent positives noted).       Cardiac Diagnostics          Physical Examination       /82 (BP Location: Right arm, Patient Position: Sitting, Cuff Size: Adult Large)   Pulse 76   Resp 16   Ht 1.676 m (5' 6\")   Wt 99.8 kg (220 lb)   BMI 35.51 kg/m          Wt Readings from Last 3 Encounters:   08/25/21 99.8 kg " (220 lb)   02/02/21 100.2 kg (221 lb)   09/08/20 99.3 kg (219 lb)     The patient is alert and oriented times three. Sclerae are anicteric. Mucosal membranes are moist. Jugular venous pressure is normal. No significant adenopathy/thyromegally appreciated. Lungs are clear with good expansion. On cardiovascular exam, the patient has a regular S1 and S2. Abdomen is soft and non-tender. Extremities reveal no clubbing, cyanosis, or edema.       Family History/Social History/Risk Factors   Patient does not smoke.  Family history reviewed, and family history includes Emphysema in her mother; Heart Failure in her mother; Melanoma in her mother; No Known Problems in her father.        Medical History  Surgical History Family History Social History   No past medical history on file.  Past Surgical History:   Procedure Laterality Date     AS REPAIR/GRAFT ACHILLES TENDON Right 2011     benign ovarian tumor removal  1999     C MOLE REMOVAL (LESIONS) TIER 1       wisdom teeth       Family History   Problem Relation Age of Onset     Heart Failure Mother      Emphysema Mother      Melanoma Mother      No Known Problems Father         Social History     Socioeconomic History     Marital status:      Spouse name: Not on file     Number of children: Not on file     Years of education: Not on file     Highest education level: Not on file   Occupational History     Not on file   Tobacco Use     Smoking status: Never Smoker     Smokeless tobacco: Never Used   Substance and Sexual Activity     Alcohol use: Never     Drug use: Never     Sexual activity: Yes     Partners: Male   Other Topics Concern     Not on file   Social History Narrative     Not on file     Social Determinants of Health     Financial Resource Strain:      Difficulty of Paying Living Expenses:    Food Insecurity:      Worried About Running Out of Food in the Last Year:      Ran Out of Food in the Last Year:    Transportation Needs:      Lack of Transportation  (Medical):      Lack of Transportation (Non-Medical):    Physical Activity:      Days of Exercise per Week:      Minutes of Exercise per Session:    Stress:      Feeling of Stress :    Social Connections:      Frequency of Communication with Friends and Family:      Frequency of Social Gatherings with Friends and Family:      Attends Temple Services:      Active Member of Clubs or Organizations:      Attends Club or Organization Meetings:      Marital Status:    Intimate Partner Violence:      Fear of Current or Ex-Partner:      Emotionally Abused:      Physically Abused:      Sexually Abused:            Medications  Allergies   Current Outpatient Medications   Medication Sig Dispense Refill     Ascorbic Acid (VITAMIN C PO) Take 500 mg by mouth daily        hydrochlorothiazide (HYDRODIURIL) 12.5 MG tablet Take 1 tablet (12.5 mg) by mouth daily 90 tablet 3     losartan (COZAAR) 50 MG tablet Take 1 tablet (50 mg) by mouth daily 90 tablet 3     rosuvastatin (CRESTOR) 5 MG tablet Take 1 tablet (5 mg) by mouth daily 90 tablet 3     vitamin D3 (CHOLECALCIFEROL) 2000 units (50 mcg) tablet Take 2,000 Units by mouth daily         Allergies   Allergen Reactions     Lisinopril Cough     Seasonal Allergies           Lab Results    Chemistry/lipid CBC Cardiac Enzymes/BNP/TSH/INR   Recent Labs   Lab Test 09/06/20  0901   CHOL 175   HDL 75   LDL 85   TRIG 74     Recent Labs   Lab Test 09/06/20  0901 09/10/19  0851 08/20/18  0000   LDL 85 158* 185*     Recent Labs   Lab Test 02/02/21  1628      POTASSIUM 3.5   CHLORIDE 107   CO2 29   GLC 90   BUN 18   CR 0.90   GFRESTIMATED 65   CONSTANTIN 8.9     Recent Labs   Lab Test 02/02/21  1628 09/06/20  0901 09/10/19  0851   CR 0.90 0.84 0.78     No results for input(s): A1C in the last 75683 hours.       Recent Labs   Lab Test 02/02/21  1628   WBC 6.9   HGB 13.5   HCT 40.4   MCV 91        Recent Labs   Lab Test 02/02/21  1628   HGB 13.5    No results for input(s): TROPONINI in the  last 32207 hours.  No results for input(s): BNP, NTBNPI, NTBNP in the last 78386 hours.  Recent Labs   Lab Test 02/02/21  1628   TSH 0.76     No results for input(s): INR in the last 06282 hours.       Medications  Allergies   Current Outpatient Medications   Medication Sig Dispense Refill     Ascorbic Acid (VITAMIN C PO) Take 500 mg by mouth daily        hydrochlorothiazide (HYDRODIURIL) 12.5 MG tablet Take 1 tablet (12.5 mg) by mouth daily 90 tablet 3     losartan (COZAAR) 50 MG tablet Take 1 tablet (50 mg) by mouth daily 90 tablet 3     rosuvastatin (CRESTOR) 5 MG tablet Take 1 tablet (5 mg) by mouth daily 90 tablet 3     vitamin D3 (CHOLECALCIFEROL) 2000 units (50 mcg) tablet Take 2,000 Units by mouth daily        Allergies   Allergen Reactions     Lisinopril Cough     Seasonal Allergies         Lab Results   Lab Results   Component Value Date     02/02/2021    CO2 29 02/02/2021    BUN 18 02/02/2021     Lab Results   Component Value Date    WBC 6.9 02/02/2021    HGB 13.5 02/02/2021    HCT 40.4 02/02/2021    MCV 91 02/02/2021     02/02/2021     Lab Results   Component Value Date    CHOL 175 09/06/2020    TRIG 74 09/06/2020    HDL 75 09/06/2020     No results found for: INR  No results found for: BNP  No results found for: CKTOTAL, CKMB, TROPONINI  Lab Results   Component Value Date    TSH 0.76 02/02/2021         Medical History  Surgical History      Past Surgical History:   Procedure Laterality Date     AS REPAIR/GRAFT ACHILLES TENDON Right 2011     benign ovarian tumor removal  1999     C MOLE REMOVAL (LESIONS) TIER 1       wisdom teeth

## 2021-08-25 NOTE — LETTER
8/25/2021    Ayleen Castro, DO  [address] [facility] [address]    RE: Kelly Mckeon       Dear Colleague,    I had the pleasure of seeing Kelly Mckeon in the [facility].           [facility]   [address], [place] [address]   [email or web address]   OFFICE: [phone]          Impression and Plan     1.  Palpitations.  Etiology not entirely clear, but somewhat suspicious of PVCs.  As noted below, palpitations are somewhat sporadic.  Plan:    30-day 1 patch monitor to further clarify rhythm disturbance.    Echocardiogram to exclude any cryptic structural heart disease.    Follow-up and further recommendations pending test results.    25 minutes spent reviewing prior records (including documentation, laboratory studies, cardiac testing/imaging), interview with patient along with physical exam, planning, and subsequent documentation/crafting of note.       History of Present Illness    Once again I would like to thank you again for asking me to participate in the care of your patient, Kelly Mckeon.  As you know, but to reiterate for my own records, Kelly Mckeon is a 71 year old female with symptoms of palpitations.  Patient describes somewhat sporadic irregularity in her heart beat.  At times it seems a bit fast and somewhat over 100 bpm.  Symptoms seem to recur without any rhyme or reason.  At times she can go a couple weeks if not more without any such palpitations.  She is somewhat disconcerted by the irregular heart rhythm.  Otherwise, no prominent symptoms such as lightheadedness, shortness of breath, or chest pain.  No fevers, chills, or other constitutional symptoms.    Further review of systems is otherwise negative/noncontributory (medical record and 13 point review of systems reviewed as well and pertinent positives noted).       Cardiac Diagnostics          Physical  "Examination       /82 (BP Location: Right arm, Patient Position: Sitting, Cuff Size: Adult Large)   Pulse 76   Resp 16   Ht 1.676 m (5' 6\")   Wt 99.8 kg (220 lb)   BMI 35.51 kg/m          Wt Readings from Last 3 Encounters:   08/25/21 99.8 kg (220 lb)   02/02/21 100.2 kg (221 lb)   09/08/20 99.3 kg (219 lb)     The patient is alert and oriented times three. Sclerae are anicteric. Mucosal membranes are moist. Jugular venous pressure is normal. No significant adenopathy/thyromegally appreciated. Lungs are clear with good expansion. On cardiovascular exam, the patient has a regular S1 and S2. Abdomen is soft and non-tender. Extremities reveal no clubbing, cyanosis, or edema.       Family History/Social History/Risk Factors   Patient does not smoke.  Family history reviewed, and family history includes Emphysema in her mother; Heart Failure in her mother; Melanoma in her mother; No Known Problems in her father.        Medical History  Surgical History Family History Social History   No past medical history on file.  Past Surgical History:   Procedure Laterality Date     AS REPAIR/GRAFT ACHILLES TENDON Right 2011     benign ovarian tumor removal  1999     C MOLE REMOVAL (LESIONS) TIER 1       wisdom teeth       Family History   Problem Relation Age of Onset     Heart Failure Mother      Emphysema Mother      Melanoma Mother      No Known Problems Father         Social History     Socioeconomic History     Marital status:      Spouse name: Not on file     Number of children: Not on file     Years of education: Not on file     Highest education level: Not on file   Occupational History     Not on file   Tobacco Use     Smoking status: Never Smoker     Smokeless tobacco: Never Used   Substance and Sexual Activity     Alcohol use: Never     Drug use: Never     Sexual activity: Yes     Partners: Male   Other Topics Concern     Not on file   Social History Narrative     Not on file     Social Determinants of " Health     Financial Resource Strain:      Difficulty of Paying Living Expenses:    Food Insecurity:      Worried About Running Out of Food in the Last Year:      Ran Out of Food in the Last Year:    Transportation Needs:      Lack of Transportation (Medical):      Lack of Transportation (Non-Medical):    Physical Activity:      Days of Exercise per Week:      Minutes of Exercise per Session:    Stress:      Feeling of Stress :    Social Connections:      Frequency of Communication with Friends and Family:      Frequency of Social Gatherings with Friends and Family:      Attends [employer] Services:      Active Member of Clubs or Organizations:      Attends Club or Organization Meetings:      Marital Status:    Intimate Partner Violence:      Fear of Current or Ex-Partner:      Emotionally Abused:      Physically Abused:      Sexually Abused:            Medications  Allergies   Current Outpatient Medications   Medication Sig Dispense Refill     Ascorbic Acid (VITAMIN C PO) Take 500 mg by mouth daily        hydrochlorothiazide (HYDRODIURIL) 12.5 MG tablet Take 1 tablet (12.5 mg) by mouth daily 90 tablet 3     losartan (COZAAR) 50 MG tablet Take 1 tablet (50 mg) by mouth daily 90 tablet 3     rosuvastatin (CRESTOR) 5 MG tablet Take 1 tablet (5 mg) by mouth daily 90 tablet 3     vitamin D3 (CHOLECALCIFEROL) 2000 units (50 mcg) tablet Take 2,000 Units by mouth daily         Allergies   Allergen Reactions     Lisinopril Cough     Seasonal Allergies           Lab Results    Chemistry/lipid CBC Cardiac Enzymes/BNP/TSH/INR   Recent Labs   Lab Test 09/06/20  0901   CHOL 175   HDL 75   LDL 85   TRIG 74     Recent Labs   Lab Test 09/06/20  0901 09/10/19  0851 08/20/18  0000   LDL 85 158* 185*     Recent Labs   Lab Test 02/02/21  1628      POTASSIUM 3.5   CHLORIDE 107   CO2 29   GLC 90   BUN 18   CR 0.90   GFRESTIMATED 65   CONSTANTIN 8.9     Recent Labs   Lab Test 02/02/21  1628 09/06/20  0901 09/10/19  0851   CR 0.90 0.84 0.78      No results for input(s): A1C in the last 80344 hours.       Recent Labs   Lab Test 02/02/21  1628   WBC 6.9   HGB 13.5   HCT 40.4   MCV 91        Recent Labs   Lab Test 02/02/21  1628   HGB 13.5    No results for input(s): TROPONINI in the last 93010 hours.  No results for input(s): BNP, NTBNPI, NTBNP in the last 14961 hours.  Recent Labs   Lab Test 02/02/21  1628   TSH 0.76     No results for input(s): INR in the last 47730 hours.       Medications  Allergies   Current Outpatient Medications   Medication Sig Dispense Refill     Ascorbic Acid (VITAMIN C PO) Take 500 mg by mouth daily        hydrochlorothiazide (HYDRODIURIL) 12.5 MG tablet Take 1 tablet (12.5 mg) by mouth daily 90 tablet 3     losartan (COZAAR) 50 MG tablet Take 1 tablet (50 mg) by mouth daily 90 tablet 3     rosuvastatin (CRESTOR) 5 MG tablet Take 1 tablet (5 mg) by mouth daily 90 tablet 3     vitamin D3 (CHOLECALCIFEROL) 2000 units (50 mcg) tablet Take 2,000 Units by mouth daily        Allergies   Allergen Reactions     Lisinopril Cough     Seasonal Allergies         Lab Results   Lab Results   Component Value Date     02/02/2021    CO2 29 02/02/2021    BUN 18 02/02/2021     Lab Results   Component Value Date    WBC 6.9 02/02/2021    HGB 13.5 02/02/2021    HCT 40.4 02/02/2021    MCV 91 02/02/2021     02/02/2021     Lab Results   Component Value Date    CHOL 175 09/06/2020    TRIG 74 09/06/2020    HDL 75 09/06/2020     No results found for: INR  No results found for: BNP  No results found for: CKTOTAL, CKMB, TROPONINI  Lab Results   Component Value Date    TSH 0.76 02/02/2021         Medical History  Surgical History      Past Surgical History:   Procedure Laterality Date     AS REPAIR/GRAFT ACHILLES TENDON Right 2011     benign ovarian tumor removal  1999     C MOLE REMOVAL (LESIONS) TIER 1       wisdom teeth                                 Thank you for allowing me to participate in the care of your patient.      Sincerely,      Grecia Rivera MD     [facility]  cc:   No referring provider defined for this encounter.

## 2021-08-26 RX ORDER — LOSARTAN POTASSIUM AND HYDROCHLOROTHIAZIDE 12.5; 5 MG/1; MG/1
1 TABLET ORAL DAILY
Qty: 90 TABLET | Refills: 0 | Status: SHIPPED | OUTPATIENT
Start: 2021-08-26 | End: 2021-09-16

## 2021-08-27 ENCOUNTER — MYC MEDICAL ADVICE (OUTPATIENT)
Dept: FAMILY MEDICINE | Facility: CLINIC | Age: 71
End: 2021-08-27

## 2021-08-27 DIAGNOSIS — E78.5 HYPERLIPIDEMIA LDL GOAL <130: ICD-10-CM

## 2021-08-27 NOTE — TELEPHONE ENCOUNTER
I sent the following message to pt in a [facility] message.  Will follow up in the [facility] message.    Nguyễn Mendoza.  I have a message from 8/23/21 when you scheduled your appt stating that you needed 3 refills and that you would run out a week before your appt.     Just to confirm ~ Do you have enough medication to get to your scheduled appt with Dr Castro?    Simona Carias RN

## 2021-08-31 RX ORDER — ROSUVASTATIN CALCIUM 5 MG/1
5 TABLET, COATED ORAL DAILY
Qty: 30 TABLET | Refills: 0 | Status: SHIPPED | OUTPATIENT
Start: 2021-08-31 | End: 2021-09-16

## 2021-08-31 NOTE — TELEPHONE ENCOUNTER
"Requested Prescriptions   Pending Prescriptions Disp Refills     rosuvastatin (CRESTOR) 5 MG tablet 30 tablet 0     Sig: Take 1 tablet (5 mg) by mouth daily       Statins Protocol Passed - 8/31/2021 10:29 AM        Passed - LDL on file in past 12 months     Recent Labs   Lab Test 09/06/20  0901   LDL 85             Passed - No abnormal creatine kinase in past 12 months     No lab results found.             Passed - Recent (12 mo) or future (30 days) visit within the authorizing provider's specialty     Patient has had an office visit with the authorizing provider or a provider within the authorizing providers department within the previous 12 mos or has a future within next 30 days. See \"Patient Info\" tab in inbasket, or \"Choose Columns\" in Meds & Orders section of the refill encounter.              Passed - Medication is active on med list        Passed - Patient is age 18 or older        Passed - No active pregnancy on record        Passed - No positive pregnancy test in past 12 months             "

## 2021-08-31 NOTE — TELEPHONE ENCOUNTER
Medication is being filled for 1 time refill only due to:  Patient needs to be seen because it has been more than one year since last visit.     Pt has pending appt 9/16/21.    Simona Carias RN

## 2021-09-10 ENCOUNTER — TELEPHONE (OUTPATIENT)
Dept: CARDIOLOGY | Facility: CLINIC | Age: 71
End: 2021-09-10

## 2021-09-10 ENCOUNTER — HOSPITAL ENCOUNTER (OUTPATIENT)
Dept: CARDIOLOGY | Facility: CLINIC | Age: 71
End: 2021-09-10
Attending: INTERNAL MEDICINE
Payer: COMMERCIAL

## 2021-09-10 DIAGNOSIS — R00.2 PALPITATIONS: Primary | ICD-10-CM

## 2021-09-10 DIAGNOSIS — R00.2 PALPITATIONS: ICD-10-CM

## 2021-09-10 LAB — LVEF ECHO: NORMAL

## 2021-09-10 PROCEDURE — 93306 TTE W/DOPPLER COMPLETE: CPT | Mod: 26 | Performed by: INTERNAL MEDICINE

## 2021-09-10 PROCEDURE — 93270 REMOTE 30 DAY ECG REV/REPORT: CPT

## 2021-09-10 PROCEDURE — 255N000002 HC RX 255 OP 636: Performed by: INTERNAL MEDICINE

## 2021-09-10 PROCEDURE — 93272 ECG/REVIEW INTERPRET ONLY: CPT | Performed by: INTERNAL MEDICINE

## 2021-09-10 RX ADMIN — PERFLUTREN 3 ML: 6.52 INJECTION, SUSPENSION INTRAVENOUS at 11:24

## 2021-09-10 NOTE — TELEPHONE ENCOUNTER
"Hi,  Are you able to help answer this patient's questions about definity contrast?  She wants to \"get to the bottom\" of what perflutren consist of and should she be worried going forward about possible exposure. She read it is plasma based and was told by the rad tech it is synthetic.  I am not sure if this is the correct pool to help answer. Please direct me elsewhere if not.  Thank you,  Kathe  ----------------------------------------------------------  Patient received definity for better pictures during echo this morning.  She experienced side effect of severe back pain.  Patient is requesting to know if perflutren is plasma based and going forward what concerns about possible encounters with perflutren.  She questions what to watch out for and if certain medications should be avoided.   Informed patient her concerns will be forwarded on as this is not in my scope of practice.  "

## 2021-09-10 NOTE — CONFIDENTIAL NOTE
Called and informed patient.    Perflutren is a contrast agent and 1.2% of patients had backache as side effect. Helped explain mechanism of action of perflutren and answered all her questions pertaining to side effect concerns and continue her current medications. No drug interactions with perflutren.    Dana Groves, PharmD  Medication Therapy Management Provider, [facility]  Pager: [phone]

## 2021-09-10 NOTE — TELEPHONE ENCOUNTER
"----- Message from Irma Dobbs sent at 9/10/2021  3:45 PM CDT -----  Regarding: DAVIDA jones  General phone call:    Caller: Kelly  Primary cardiologist: DAVIDA jones   Detailed reason for call: Patient had an echo this morning.  She was given a \"synthetic cholesterol drug\" which the tech said would give her the ability to get a better picture.  She had a reaction to the medication, Perflutren and was told by the tech to mention this in the future.  She has questions about the medication as she has researched it more.  Her symptoms have resolved however she had really bad back pain which traveled up her back.   Best phone number: [phone]   Best time to contact: any  Ok to leave a detailedmessage? yes  Device? no    Additional Info:      "

## 2021-09-11 ASSESSMENT — ENCOUNTER SYMPTOMS
HEARTBURN: 0
DIARRHEA: 0
NAUSEA: 0
ARTHRALGIAS: 1
DYSURIA: 0
SHORTNESS OF BREATH: 0
EYE PAIN: 0
COUGH: 0
FREQUENCY: 0
HEMATOCHEZIA: 0
HEMATURIA: 0
MYALGIAS: 0
BREAST MASS: 0
ABDOMINAL PAIN: 0
SORE THROAT: 0
PALPITATIONS: 0
HEADACHES: 0
FEVER: 0
NERVOUS/ANXIOUS: 0
JOINT SWELLING: 1
PARESTHESIAS: 0
WEAKNESS: 0
CHILLS: 0
DIZZINESS: 0
CONSTIPATION: 0

## 2021-09-11 ASSESSMENT — ACTIVITIES OF DAILY LIVING (ADL): CURRENT_FUNCTION: NO ASSISTANCE NEEDED

## 2021-09-16 ENCOUNTER — OFFICE VISIT (OUTPATIENT)
Dept: FAMILY MEDICINE | Facility: CLINIC | Age: 71
End: 2021-09-16
Payer: COMMERCIAL

## 2021-09-16 VITALS
BODY MASS INDEX: 35.36 KG/M2 | TEMPERATURE: 98.3 F | OXYGEN SATURATION: 98 % | DIASTOLIC BLOOD PRESSURE: 88 MMHG | RESPIRATION RATE: 16 BRPM | WEIGHT: 220 LBS | HEART RATE: 74 BPM | HEIGHT: 66 IN | SYSTOLIC BLOOD PRESSURE: 138 MMHG

## 2021-09-16 DIAGNOSIS — E78.5 HYPERLIPIDEMIA LDL GOAL <130: ICD-10-CM

## 2021-09-16 DIAGNOSIS — Z23 NEED FOR PROPHYLACTIC VACCINATION AND INOCULATION AGAINST INFLUENZA: ICD-10-CM

## 2021-09-16 DIAGNOSIS — Z00.00 ENCOUNTER FOR MEDICARE ANNUAL WELLNESS EXAM: Primary | ICD-10-CM

## 2021-09-16 DIAGNOSIS — I10 ESSENTIAL HYPERTENSION: ICD-10-CM

## 2021-09-16 DIAGNOSIS — E66.01 MORBID OBESITY (H): ICD-10-CM

## 2021-09-16 LAB
ANION GAP SERPL CALCULATED.3IONS-SCNC: 4 MMOL/L (ref 3–14)
BUN SERPL-MCNC: 14 MG/DL (ref 7–30)
CALCIUM SERPL-MCNC: 8.1 MG/DL (ref 8.5–10.1)
CHLORIDE BLD-SCNC: 110 MMOL/L (ref 94–109)
CHOLEST SERPL-MCNC: 175 MG/DL
CO2 SERPL-SCNC: 27 MMOL/L (ref 20–32)
CREAT SERPL-MCNC: 0.81 MG/DL (ref 0.52–1.04)
FASTING STATUS PATIENT QL REPORTED: YES
GFR SERPL CREATININE-BSD FRML MDRD: 74 ML/MIN/1.73M2
GLUCOSE BLD-MCNC: 87 MG/DL (ref 70–99)
HDLC SERPL-MCNC: 88 MG/DL
LDLC SERPL CALC-MCNC: 73 MG/DL
NONHDLC SERPL-MCNC: 87 MG/DL
POTASSIUM BLD-SCNC: 3.6 MMOL/L (ref 3.4–5.3)
SODIUM SERPL-SCNC: 141 MMOL/L (ref 133–144)
TRIGL SERPL-MCNC: 68 MG/DL

## 2021-09-16 PROCEDURE — G0008 ADMIN INFLUENZA VIRUS VAC: HCPCS | Performed by: INTERNAL MEDICINE

## 2021-09-16 PROCEDURE — 80048 BASIC METABOLIC PNL TOTAL CA: CPT | Performed by: INTERNAL MEDICINE

## 2021-09-16 PROCEDURE — 36415 COLL VENOUS BLD VENIPUNCTURE: CPT | Performed by: INTERNAL MEDICINE

## 2021-09-16 PROCEDURE — 90662 IIV NO PRSV INCREASED AG IM: CPT | Performed by: INTERNAL MEDICINE

## 2021-09-16 PROCEDURE — 99397 PER PM REEVAL EST PAT 65+ YR: CPT | Mod: 25 | Performed by: INTERNAL MEDICINE

## 2021-09-16 PROCEDURE — 99214 OFFICE O/P EST MOD 30 MIN: CPT | Mod: 25 | Performed by: INTERNAL MEDICINE

## 2021-09-16 PROCEDURE — 80061 LIPID PANEL: CPT | Performed by: INTERNAL MEDICINE

## 2021-09-16 RX ORDER — ROSUVASTATIN CALCIUM 5 MG/1
5 TABLET, COATED ORAL DAILY
Qty: 90 TABLET | Refills: 3 | Status: SHIPPED | OUTPATIENT
Start: 2021-09-16 | End: 2022-07-21

## 2021-09-16 RX ORDER — LOSARTAN POTASSIUM AND HYDROCHLOROTHIAZIDE 12.5; 5 MG/1; MG/1
1 TABLET ORAL DAILY
Qty: 90 TABLET | Refills: 3 | Status: SHIPPED | OUTPATIENT
Start: 2021-09-16 | End: 2021-10-27

## 2021-09-16 ASSESSMENT — ENCOUNTER SYMPTOMS
BREAST MASS: 0
FEVER: 0
SHORTNESS OF BREATH: 0
HEADACHES: 0
WEAKNESS: 0
EYE PAIN: 0
NERVOUS/ANXIOUS: 0
HEMATOCHEZIA: 0
FREQUENCY: 0
PARESTHESIAS: 0
HEMATURIA: 0
MYALGIAS: 0
CHILLS: 0
CONSTIPATION: 0
DIZZINESS: 0
HEARTBURN: 0
DIARRHEA: 0
PALPITATIONS: 0
COUGH: 0
JOINT SWELLING: 1
ARTHRALGIAS: 1
NAUSEA: 0
SORE THROAT: 0
DYSURIA: 0
ABDOMINAL PAIN: 0

## 2021-09-16 ASSESSMENT — ACTIVITIES OF DAILY LIVING (ADL): CURRENT_FUNCTION: NO ASSISTANCE NEEDED

## 2021-09-16 ASSESSMENT — MIFFLIN-ST. JEOR: SCORE: 1536.91

## 2021-09-16 NOTE — PATIENT INSTRUCTIONS
Patient Education   Personalized Prevention Plan  You are due for the preventive services outlined below.  Your care team is available to assist you in scheduling these services.  If you have already completed any of these items, please share that information with your care team to update in your medical record.  Health Maintenance Due   Topic Date Due     ANNUAL REVIEW OF HM ORDERS  Never done     Flu Vaccine (1) 09/01/2021     FALL RISK ASSESSMENT  09/08/2021     Annual Wellness Visit  09/08/2021             Dr. Castro's Tips for a Healthy Diet    1. Add more fresh fruits and vegetables to your diet.  The more colorful with the fruit or vegetable (think berries, spinach, carrots, peppers) the healthier it tends to be.  Juice is not a fruit.  Prepare the vegetables in a healthy way - steam, bake.  Avoid batter/breading, butter/oil to cook.  2. Add more fiber to your diet.  Swap out white bread, white rice, white pasta for whole grain versions.  Reduce the portion size and frequency of carbohydrates/starches.  3. Choose healthier fats such as nuts, olive oil, avocado, etc. Stay away from lard, butter.  4. Decrease the frequency and portion size of 'junk food' -pizza, candy, cookies, potato chips, etc.  5. Watch liquid calories such as coffee drinks, juice, soda, teas.  There tends to be excessive sugar in these beverages.  6. Increase protein in your diet.  Eggs, cheese, yogurt, nuts, lentils, chicken, fish are good healthy choices.  Protein keeps you boykin longer, and you are less likely to have blood sugar spikes  7. Eat healthy at least 80% of the time.  It is ok for a special treat (Mom's spaghetti dinner, cake on your birthday) every once in a while, just not every day.  When are you going to indulge (think [employer] time), be sure you are eating extra healthy the day before and after.      Resources:    1. [employer] Resources for Health and Wellness:[email or web address]://[email or web address].[email or web address]/dig-yes-foods    2.    [facility] Ways To Wellness:    https://www.[facility].org/services/ways-to-wellness  Ways to Wellness offers:    Nutrition and weight management     Corrective exercise and fitness training     Lifestyle and behavioral change     Healing services  Our team includes registered dietitians, certified personal trainers, life coaches, as well as a Culinary Educator.    3. [facility] for Cardiovascular Disease Prevention  Consider making an appointment at the [facility] if either of the following describes you:    You are an adult who has risk factors for cardiovascular disease. Risk factors include cholesterol elevations, diabetes, high blood pressure, elevated weight, inactive living, and history of tobacco use.     You don t have traditional risk factors but have a strong family history of cardiovascular disease, which may mean you have a higher of risk of cardiovascular disease.     4. Atomic Habits by Jeremias Perez.  This a good book that looks into our habits and how to sustain good healthy habits and get rid of bad habits.    Medications;  1. Refills x 1 year, call pharmacy to fill  2. Labs today and in 1 year

## 2021-09-16 NOTE — NURSING NOTE
Prior to immunization administration, verified patients identity using patient s name and date of birth. Please see Immunization Activity for additional information.     Screening Questionnaire for Adult Immunization    Are you sick today?   No   Do you have allergies to medications, food, a vaccine component or latex?   No   Have you ever had a serious reaction after receiving a vaccination?   No   Do you have a long-term health problem with heart, lung, kidney, or metabolic disease (e.g., diabetes), asthma, a blood disorder, no spleen, complement component deficiency, a cochlear implant, or a spinal fluid leak?  Are you on long-term aspirin therapy?   No   Do you have cancer, leukemia, HIV/AIDS, or any other immune system problem?   No   Do you have a parent, brother, or sister with an immune system problem?   No   In the past 3 months, have you taken medications that affect  your immune system, such as prednisone, other steroids, or anticancer drugs; drugs for the treatment of rheumatoid arthritis, Crohn s disease, or psoriasis; or have you had radiation treatments?   No   Have you had a seizure, or a brain or other nervous system problem?   No   During the past year, have you received a transfusion of blood or blood    products, or been given immune (gamma) globulin or antiviral drug?   No   For women: Are you pregnant or is there a chance you could become       pregnant during the next month?   No   Have you received any vaccinations in the past 4 weeks?   No     Immunization questionnaire answers were all negative.        Per orders of Dr. Addison, injection of Flu shot given by Aria Daily CMA. Patient instructed to remain in clinic for 15 minutes afterwards, and to report any adverse reaction to me immediately.       Screening performed by Aria Daily CMA on 9/16/2021 at 10:04 AM.

## 2021-09-16 NOTE — PROGRESS NOTES
"SUBJECTIVE:   Kelly Mckeon is a 71 year old female who presents for Preventive Visit.      Patient has been advised of split billing requirements and indicates understanding: Yes   Are you in the first 12 months of your Medicare coverage?  No    Flu shot: Today    Healthy Habits:     In general, how would you rate your overall health?  Good    Frequency of exercise:  2-3 days/week    Duration of exercise:  15-30 minutes    Do you usually eat at least 4 servings of fruit and vegetables a day, include whole grains    & fiber and avoid regularly eating high fat or \"junk\" foods?  Yes    Taking medications regularly:  Yes    Medication side effects:  None    Ability to successfully perform activities of daily living:  No assistance needed    Home Safety:  No safety concerns identified    Hearing Impairment:  No hearing concerns    In the past 6 months, have you been bothered by leaking of urine?  No    In general, how would you rate your overall mental or emotional health?  Good      PHQ-2 Total Score: 0    Additional concerns today:  No    Healthcare maintenance:   --scope in 2015, declines further  --FIT 2/2021    Palpitations: Echocardiogram: wants to review the results - She is under Dr. Beatty. - using a heart monitor  --had episode in mid -aug  --saw cards who ordered 1 month monitor and had echo last week  --had severe back pain with definity/perflutren contrast for echo    Echo  1.Left ventricular size, wall motion and function are normal. The ejection  fraction is 60-65%, visualy hyperdynamic.  2.TAPSE is normal, which is consistent with normal right ventricular systolic  function.  3.No hemodynamically significant valvular abnormalities on 2D or color flow imaging.  4.There is no comparison study available.    Hyperlipidemia Follow-Up      Are you regularly taking any medication or supplement to lower your cholesterol?   Yes- rosuvastatin 5 mg    Are you having muscle aches or other side effects that you " think could be caused by your cholesterol lowering medication?  No    Hypertension Follow-up      Do you check your blood pressure regularly outside of the clinic? Yes     Are you following a low salt diet? Yes    Are your blood pressures ever more than 140 on the top number (systolic) OR more   than 90 on the bottom number (diastolic), for example 140/90? No  BP Readings from Last 6 Encounters:   09/16/21 (!) 140/100   08/25/21 138/82   02/02/21 128/82   09/08/20 136/84   09/10/19 (!) 148/79         Do you feel safe in your environment? Yes    Have you ever done Advance Care Planning? (For example, a Health Directive, POLST, or a discussion with a medical provider or your loved ones about your wishes): No, advance care planning information given to patient to review.  Patient plans to discuss their wishes with loved ones or provider.         Fall risk  Fallen 2 or more times in the past year?: No  Any fall with injury in the past year?: No    Cognitive Screening   1) Repeat 3 items (Leader, Season, Table)  All 3 words repeated back.  2) Clock draw: NORMAL  3) 3 item recall: Recalls 3 objects  Results: 3 items recalled: COGNITIVE IMPAIRMENT LESS LIKELY    Mini-CogTM Copyright S Nicholas. Licensed by the author for use in [facility]; reprinted with permission (estelle@[facility]). All rights reserved.      Do you have sleep apnea, excessive snoring or daytime drowsiness?: no    Reviewed and updated as needed this visit by clinical staff  Tobacco  Allergies  Meds   Med Hx  Surg Hx  Fam Hx  Soc Hx        Reviewed and updated as needed this visit by Provider                Social History     Tobacco Use     Smoking status: Never Smoker     Smokeless tobacco: Never Used   Substance Use Topics     Alcohol use: Never     If you drink alcohol do you typically have >3 drinks per day or >7 drinks per week? No    Alcohol Use 9/16/2021   Prescreen: >3 drinks/day or >7 drinks/week? -   Prescreen: >3 drinks/day or >7  "drinks/week? No               Current providers sharing in care for this patient include:   Patient Care Team:  Ayleen Castro DO as PCP - General (Internal Medicine)  Ayleen Castro DO as Assigned PCP  Grecia Rivera MD as Assigned Heart and Vascular Provider    The following health maintenance items are reviewed in Epic and correct as of today:  Health Maintenance Due   Topic Date Due     FALL RISK ASSESSMENT  09/08/2021     Current Outpatient Medications   Medication Sig Dispense Refill     Ascorbic Acid (VITAMIN C PO) Take 500 mg by mouth daily        losartan-hydrochlorothiazide (HYZAAR) 50-12.5 MG tablet Take 1 tablet by mouth daily 90 tablet 3     rosuvastatin (CRESTOR) 5 MG tablet Take 1 tablet (5 mg) by mouth daily 90 tablet 3     vitamin D3 (CHOLECALCIFEROL) 2000 units (50 mcg) tablet Take 2,000 Units by mouth daily               Review of Systems   Constitutional: Negative for chills and fever.   HENT: Negative for congestion, ear pain, hearing loss and sore throat.    Eyes: Negative for pain and visual disturbance.   Respiratory: Negative for cough and shortness of breath.    Cardiovascular: Negative for chest pain, palpitations and peripheral edema.   Gastrointestinal: Negative for abdominal pain, constipation, diarrhea, heartburn, hematochezia and nausea.   Breasts:  Negative for tenderness, breast mass and discharge.   Genitourinary: Negative for dysuria, frequency, genital sores, hematuria, pelvic pain, urgency, vaginal bleeding and vaginal discharge.   Musculoskeletal: Positive for arthralgias and joint swelling. Negative for myalgias.   Skin: Negative for rash.   Neurological: Negative for dizziness, weakness, headaches and paresthesias.   Psychiatric/Behavioral: Negative for mood changes. The patient is not nervous/anxious.          OBJECTIVE:   BP (!) 140/100   Pulse 74   Temp 98.3  F (36.8  C) (Tympanic)   Resp 16   Ht 1.68 m (5' 6.14\")   Wt 99.8 kg (220 lb)   SpO2 " "98%   Breastfeeding No   BMI 35.36 kg/m   Estimated body mass index is 35.36 kg/m  as calculated from the following:    Height as of this encounter: 1.68 m (5' 6.14\").    Weight as of this encounter: 99.8 kg (220 lb).  Physical Exam  GENERAL: healthy, alert and no distress  EYES: Eyes grossly normal to inspection, PERRL and conjunctivae and sclerae normal  HENT: ear canals and TM's normal, nose and mouth without ulcers or lesions  NECK: no adenopathy, no asymmetry, masses, or scars and thyroid normal to palpation  RESP: lungs clear to auscultation - no rales, rhonchi or wheezes  CV: regular rate and rhythm, normal S1 S2, no S3 or S4, no murmur, click or rub, no peripheral edema and peripheral pulses strong  ABDOMEN: soft, nontender, no hepatosplenomegaly, no masses and bowel sounds normal  MS: no gross musculoskeletal defects noted, no edema  SKIN: no suspicious lesions or rashes  NEURO: Normal strength and tone, mentation intact and speech normal  PSYCH: mentation appears normal, affect normal/bright    Diagnostic Test Results:  Labs reviewed in Epic    ASSESSMENT / PLAN:   (Z00.00) Encounter for Medicare annual wellness exam  (primary encounter diagnosis)  Comment:   Plan:     (I10) Essential hypertension  Comment: slightly high  Plan: losartan-hydrochlorothiazide (HYZAAR) 50-12.5         MG tablet, Basic metabolic panel  (Ca, Cl, CO2,        Creat, Gluc, K, Na, BUN), Basic metabolic         panel, OFFICE/OUTPT VISIT,EST,LEVL IV        RN blood pressure check    (E78.5) Hyperlipidemia LDL goal <130  Comment:   Plan: rosuvastatin (CRESTOR) 5 MG tablet, Lipid panel        reflex to direct LDL Fasting, Lipid panel         reflex to direct LDL Fasting, OFFICE/OUTPT         VISIT,EST,LEVL IV         - stable, refill provided    (E66.01) Morbid obesity (H)  Comment: discussed diet and exercise  Plan:     (Z23) Need for prophylactic vaccination and inoculation against influenza  Comment:   Plan: INFLUENZA, QUAD, HIGH " "DOSE, PF, 65YR + (FLUZONE        HD)              Patient has been advised of split billing requirements and indicates understanding: Yes  COUNSELING:  Reviewed preventive health counseling, as reflected in patient instructions    Estimated body mass index is 35.36 kg/m  as calculated from the following:    Height as of this encounter: 1.68 m (5' 6.14\").    Weight as of this encounter: 99.8 kg (220 lb).    Weight management plan: Discussed healthy diet and exercise guidelines    She reports that she has never smoked. She has never used smokeless tobacco.      Appropriate preventive services were discussed with this patient, including applicable screening as appropriate for cardiovascular disease, diabetes, osteopenia/osteoporosis, and glaucoma.  As appropriate for age/gender, discussed screening for colorectal cancer, prostate cancer, breast cancer, and cervical cancer. Checklist reviewing preventive services available has been given to the patient.    Reviewed patients plan of care and provided an AVS. The Basic Care Plan (routine screening as documented in Health Maintenance) for Kelly meets the Care Plan requirement. This Care Plan has been established and reviewed with the Patient.    Counseling Resources:  ATP IV Guidelines  Pooled Cohorts Equation Calculator  Breast Cancer Risk Calculator  Breast Cancer: Medication to Reduce Risk  FRAX Risk Assessment  ICSI Preventive Guidelines  Dietary Guidelines for Americans, 2010  [employer]'s MyPlate  ASA Prophylaxis  Lung CA Screening    Ayleen Castro DO  [facility]    Identified Health Risks:  "

## 2021-10-04 ENCOUNTER — TELEPHONE (OUTPATIENT)
Dept: CARDIOLOGY | Facility: CLINIC | Age: 71
End: 2021-10-04

## 2021-10-04 NOTE — CONFIDENTIAL NOTE
Dr. Rivera,  Please see patient update below.   Follow up pending monitor results.  Any new orders or recommendations?  Thank you,  Kathe    -------------------------------------------  Called patient for symptom assessment during notification period.    Patient woke up to go to the bathroom at time of alert and noted palpitations. Upon check heart rate was 80 bpm and irregular. She also notes frequent urination during this time.  She went back into normal rhythm at 7:30 am.  Patient denied associated symptoms of shortness of breath, lightheadedness, dizziness, syncope and chest pain/tightness.  Monitoring period will end 10/8/21.  Assured patient an update will be sent to Dr. Rivera and patient will be contacted if new recommendations are offered in the interim. Patient verbalized understanding and has no further questions or concerns at this time.    ---------------------------------------------------------------------------      ----------------------------------------------------

## 2021-10-04 NOTE — TELEPHONE ENCOUNTER
----- Message from JOANNA Benson sent at 10/4/2021  8:14 AM CDT -----  Regarding: lifewatch notification  HENRY pt had lifewatch notification. EKG showed new onset afib. EKG in the lifewatch folder. Thanks!

## 2021-10-05 NOTE — CONFIDENTIAL NOTE
Left detailed message with my direct number to call for recommendation to be seen in atrial fibrillation clinic.  --------------------------------  Grecia Rivera MD  You 13 hours ago (7:11 PM)     FR    Appears to be atrial fibrillation. Let's have Kelly set up to be seen in the Atrial Fibrillation Clinic. Thanks!

## 2021-10-05 NOTE — CONFIDENTIAL NOTE
Recommendations reviewed with patient. She is agreeable to be seen in clinic consult in the atrial fibrillation clinic. Patient has no questions or concerns at this time. Encouraged patient to call in the interim with any questions or concerns. Call transferred to scheduling to arrange.

## 2021-10-15 ENCOUNTER — OFFICE VISIT (OUTPATIENT)
Dept: CARDIOLOGY | Facility: CLINIC | Age: 71
End: 2021-10-15
Payer: COMMERCIAL

## 2021-10-15 VITALS
SYSTOLIC BLOOD PRESSURE: 170 MMHG | WEIGHT: 222 LBS | BODY MASS INDEX: 35.68 KG/M2 | HEIGHT: 66 IN | HEART RATE: 68 BPM | DIASTOLIC BLOOD PRESSURE: 88 MMHG | RESPIRATION RATE: 16 BRPM

## 2021-10-15 DIAGNOSIS — I48.0 PAROXYSMAL ATRIAL FIBRILLATION (H): Primary | ICD-10-CM

## 2021-10-15 DIAGNOSIS — G47.33 OSA (OBSTRUCTIVE SLEEP APNEA): ICD-10-CM

## 2021-10-15 PROCEDURE — 99215 OFFICE O/P EST HI 40 MIN: CPT | Performed by: NURSE PRACTITIONER

## 2021-10-15 RX ORDER — METOPROLOL SUCCINATE 25 MG/1
25 TABLET, EXTENDED RELEASE ORAL DAILY
Qty: 30 TABLET | Refills: 3 | Status: SHIPPED | OUTPATIENT
Start: 2021-10-15 | End: 2022-02-07

## 2021-10-15 ASSESSMENT — MIFFLIN-ST. JEOR: SCORE: 1543.74

## 2021-10-15 NOTE — PROGRESS NOTES
Assessment/Recommendations     Assessment:      1.  Paroxysmal atrial fibrillation-patient has been undergoing recent work-up for palpitations; she wore a 30-day heart monitor that captured paroxysmal atrial fibrillation.  Patient has kept excellent track of her cardiac events and reports a 12-hour episode of atrial fibrillation 1/21, a 5-hour episode 8/21, a 2-hour episode 10/21    -Pathophysiology and chronic nature of atrial fibrillation discussed with patient and her .  Rate versus rhythm control management approaches were discussed.  [id number] scoring system and elevated risk of thromboembolism/stroke discussed.  Triggers of atrial fibrillation also discussed.  -Start metoprolol succinate 25 mg oral daily  -Start Eliquis once blood pressure is <140/80 for at least 2 days.  Patient's blood pressure is extremely elevated today 180/96 and I have advised her to hold off starting the Eliquis until her blood pressure is well managed to avoid intracranial bleeding complications.  We talked about side effects of Eliquis including bleeding and when to seek care.  -Briefly discussed watchman device  -Continue to keep a log of episodes of arrhythmia    2.  Uncontrolled blood pressure-blood pressure today 170/88; recheck 180/96.  It is unclear if the patient takes her blood pressures because the patient says she does and the  says she does not regardless I have asked her to take her blood pressure every day and follow-up with her PCP in 3 days for a blood pressure management check    -Patient should take an extra blood pressure pill when she gets home-losartan/hydrochlorothiazide 50-12.5 oral x1  -Tomorrow morning she will be starting metoprolol succinate 25 mg oral daily which should help improve her blood pressure  -Exercise for at least 20 minutes every day without stopping  -Reduce salt in your diet    3.  Probable sleep apnea-according to the patient's  patient has severe snoring.   Educated patient and  that atrial fibrillation is more difficult to manage if patient has untreated sleep apnea.    -Sleep evaluation ordered      [id number] score of 3: 1 gender, 1 age, 1 hypertension and on we will start Eliquis 5 mg p.o. twice daily once her blood pressure is well managed.    Kelly Mckeon will follow up Maria Esther Bains in 3 months.       History of Present Illness/Subjective    MsJorje Mckeon is a 71 year old female seen at [facility] today for paroxysmal atrial fibrillation.      Kelly Mckeon has a known history of neuropathy, obesity, hyperlipidemia, hypertension.    Patient reports intermittent episodes of symptomatic atrial fibrillation.  Patient reports high awareness of her heart rhythm when it goes into atrial fibrillation and her main symptom is palpitations..  She denies fatigue, lightheadedness, shortness of breath, dyspnea on exertion, orthopnea, PND, chest pain, abdominal fullness/bloating and lower extremity edema.      Cardiographics (reviewed):    ECHO  Echo result w/o MOPS: Interpretation Summary 1.Left ventricular size, wall motion and function are normal. The ejectionfraction is 60-65%, visualy hyperdynamic.2.TAPSE is normal, which is consistent with normal right ventricular systolicfunction.3.No hemodynamically significant valvular abnormalities on 2D or color flowimaging.4.There is no comparison study available.    9/10/2021 cardiac event monitor  Patient wore monitor 9/10/2021 through 10/9/2021    Impression  Predominantly sinus rhythm.  Paroxysmal atrial fibrillation - single episode lasting 2hrs 26min associated with borderline high average ventricular rate.  Symptom triggers (3) correlated to atrial fibrillation, sinus rhythm with isolated PAC.        Physical Examination Review of Systems   Vitals: BP (!) 170/88 (BP Location: Right arm, Patient Position: Sitting, Cuff Size: Adult Large)   Pulse 68   Resp 16   Ht 1.676 m (5'  "6\")   Wt 100.7 kg (222 lb)   BMI 35.83 kg/m    BMI= Body mass index is 35.83 kg/m .  Wt Readings from Last 3 Encounters:   10/15/21 100.7 kg (222 lb)   09/16/21 99.8 kg (220 lb)   08/25/21 99.8 kg (220 lb)       General Appearance:   Alert, cooperative and in no acute distress.   ENT/Mouth: membranes moist, no facial drooping   EYES:  no scleral icterus, normal conjunctivae   Neck: no JVD   Chest/Lungs:   lungs are clear to auscultation, no rales or wheezing, respirations unlabored   Cardiovascular:   Regular. Normal first and second heart sounds with no murmurs, rubs, or gallops; the radial and posterior tibial pulses are intact, no edema bilateral lower extremities    Abdomen:  Soft, nontender, nondistended, bowel sounds present   Extremities: no cyanosis or clubbing   Skin: warm, dry.    Neurologic: mood and affect are appropriate, alert and oriented x3         Please refer above for cardiac ROS details.      Medical History  Surgical History Family History Social History   No past medical history on file.  Past Surgical History:   Procedure Laterality Date     AS REPAIR/GRAFT ACHILLES TENDON Right 2011     benign ovarian tumor removal  1999     C MOLE REMOVAL (LESIONS) TIER 1       wisdom teeth       Family History   Problem Relation Age of Onset     Heart Failure Mother      Emphysema Mother      Melanoma Mother      No Known Problems Father     Social History     Socioeconomic History     Marital status:      Spouse name: Not on file     Number of children: Not on file     Years of education: Not on file     Highest education level: Not on file   Occupational History     Not on file   Tobacco Use     Smoking status: Never Smoker     Smokeless tobacco: Never Used   Substance and Sexual Activity     Alcohol use: Never     Drug use: Never     Sexual activity: Yes     Partners: Male   Other Topics Concern     Not on file   Social History Narrative     Not on file     Social Determinants of Health "     Financial Resource Strain:      Difficulty of Paying Living Expenses:    Food Insecurity:      Worried About Running Out of Food in the Last Year:      Ran Out of Food in the Last Year:    Transportation Needs:      Lack of Transportation (Medical):      Lack of Transportation (Non-Medical):    Physical Activity:      Days of Exercise per Week:      Minutes of Exercise per Session:    Stress:      Feeling of Stress :    Social Connections:      Frequency of Communication with Friends and Family:      Frequency of Social Gatherings with Friends and Family:      Attends [employer] Services:      Active Member of Clubs or Organizations:      Attends Club or Organization Meetings:      Marital Status:    Intimate Partner Violence:      Fear of Current or Ex-Partner:      Emotionally Abused:      Physically Abused:      Sexually Abused:           Medications  Allergies   Current Outpatient Medications   Medication Sig Dispense Refill     apixaban ANTICOAGULANT (ELIQUIS) 5 MG tablet Take 1 tablet (5 mg) by mouth 2 times daily 60 tablet 11     Ascorbic Acid (VITAMIN C PO) Take 500 mg by mouth daily        losartan-hydrochlorothiazide (HYZAAR) 50-12.5 MG tablet Take 1 tablet by mouth daily 90 tablet 3     metoprolol succinate ER (TOPROL-XL) 25 MG 24 hr tablet Take 1 tablet (25 mg) by mouth daily 30 tablet 3     rosuvastatin (CRESTOR) 5 MG tablet Take 1 tablet (5 mg) by mouth daily 90 tablet 3     vitamin D3 (CHOLECALCIFEROL) 2000 units (50 mcg) tablet Take 2,000 Units by mouth daily      Allergies   Allergen Reactions     Definity      Lisinopril Cough     Perflutren Lipid Microsphere [Propane]      Severe back pain     Seasonal Allergies          Lab Results    Chemistry/lipid CBC Cardiac Enzymes/BNP/TSH/INR   Recent Labs   Lab Test 09/16/21  1107   CHOL 175   HDL 88   LDL 73   TRIG 68     Recent Labs   Lab Test 09/16/21  1107 09/06/20  0901 09/10/19  0851   LDL 73 85 158*     Recent Labs   Lab Test 09/16/21  1107   NA  141   POTASSIUM 3.6   CHLORIDE 110*   CO2 27   GLC 87   BUN 14   CR 0.81   GFRESTIMATED 74   CONSTANTIN 8.1*     Recent Labs   Lab Test 09/16/21  1107 02/02/21  1628 09/06/20  0901   CR 0.81 0.90 0.84     No results for input(s): A1C in the last 35370 hours. Recent Labs   Lab Test 02/02/21  1628   WBC 6.9   HGB 13.5   HCT 40.4   MCV 91        Recent Labs   Lab Test 02/02/21  1628   HGB 13.5    No results for input(s): TROPONINI in the last 83952 hours.  No results for input(s): BNP, NTBNPI, NTBNP in the last 16107 hours.  Recent Labs   Lab Test 02/02/21  1628   TSH 0.76     No results for input(s): INR in the last 21823 hours.     Total Time- 60 minutes spent on date of encounter doing chart review, history and exam, documentation and further activities as noted above.  This note has been dictated using voice recognition software. Any grammatical, typographical, or context distortions are unintentional and inherent to the software.    Maria Esther Bains, [facility]

## 2021-10-15 NOTE — PATIENT INSTRUCTIONS
Kelly Mckeon,    It was a pleasure to see you today at the [facility].     My recommendations after this visit include:    Recommend sleep study    Start metoprolol succinate 25mg oral daily    Do not start your oral blood thinner until your BP < 140/80 for 2 days    See your primary doctor Monday about BP    Eliquis 5mg oral twice per day    *Please call if atrial fibrillation episodes more frequent or stuck in atrial fibrillation.      Exercise for at least 20 minutes every day    Reduce salt in your diet  Follow up in 3 months     My contact information:  Maria Esther Bains CNP  After Hours or Scheduling  [phone]  My Nurses phone number [phone]- normal business hours

## 2021-10-15 NOTE — LETTER
10/15/2021    Ayleen Castro, DO  [address] [facility] [address]    RE: Kelly YANG Mike       Dear Colleague,    I had the pleasure of seeing Kelly Mckeon in the [facility].          Assessment/Recommendations     Assessment:      1.  Paroxysmal atrial fibrillation-patient has been undergoing recent work-up for palpitations; she wore a 30-day heart monitor that captured paroxysmal atrial fibrillation.  Patient has kept excellent track of her cardiac events and reports a 12-hour episode of atrial fibrillation 1/21, a 5-hour episode 8/21, a 2-hour episode 10/21    -Pathophysiology and chronic nature of atrial fibrillation discussed with patient and her .  Rate versus rhythm control management approaches were discussed.  [id number] scoring system and elevated risk of thromboembolism/stroke discussed.  Triggers of atrial fibrillation also discussed.  -Start metoprolol succinate 25 mg oral daily  -Start Eliquis once blood pressure is <140/80 for at least 2 days.  Patient's blood pressure is extremely elevated today 180/96 and I have advised her to hold off starting the Eliquis until her blood pressure is well managed to avoid intracranial bleeding complications.  We talked about side effects of Eliquis including bleeding and when to seek care.  -Briefly discussed watchman device  -Continue to keep a log of episodes of arrhythmia    2.  Uncontrolled blood pressure-blood pressure today 170/88; recheck 180/96.  It is unclear if the patient takes her blood pressures because the patient says she does and the  says she does not regardless I have asked her to take her blood pressure every day and follow-up with her PCP in 3 days for a blood pressure management check    -Patient should take an extra blood pressure pill when she gets home-losartan/hydrochlorothiazide 50-12.5 oral x1  -Tomorrow morning she will be starting metoprolol  succinate 25 mg oral daily which should help improve her blood pressure  -Exercise for at least 20 minutes every day without stopping  -Reduce salt in your diet    3.  Probable sleep apnea-according to the patient's  patient has severe snoring.  Educated patient and  that atrial fibrillation is more difficult to manage if patient has untreated sleep apnea.    -Sleep evaluation ordered      [id number] score of 3: 1 gender, 1 age, 1 hypertension and on we will start Eliquis 5 mg p.o. twice daily once her blood pressure is well managed.    Kelly Mckeon will follow up Maria Esther Bains in 3 months.       History of Present Illness/Subjective    MsJorje Mckeon is a 71 year old female seen at [facility] today for paroxysmal atrial fibrillation.      Kelly Mckeon has a known history of neuropathy, obesity, hyperlipidemia, hypertension.    Patient reports intermittent episodes of symptomatic atrial fibrillation.  Patient reports high awareness of her heart rhythm when it goes into atrial fibrillation and her main symptom is palpitations..  She denies fatigue, lightheadedness, shortness of breath, dyspnea on exertion, orthopnea, PND, chest pain, abdominal fullness/bloating and lower extremity edema.      Cardiographics (reviewed):    ECHO  Echo result w/o MOPS: Interpretation Summary 1.Left ventricular size, wall motion and function are normal. The ejectionfraction is 60-65%, visualy hyperdynamic.2.TAPSE is normal, which is consistent with normal right ventricular systolicfunction.3.No hemodynamically significant valvular abnormalities on 2D or color flowimaging.4.There is no comparison study available.    9/10/2021 cardiac event monitor  Patient wore monitor 9/10/2021 through 10/9/2021    Impression  Predominantly sinus rhythm.  Paroxysmal atrial fibrillation - single episode lasting 2hrs 26min associated with borderline high average ventricular rate.  Symptom triggers (3)  "correlated to atrial fibrillation, sinus rhythm with isolated PAC.        Physical Examination Review of Systems   Vitals: BP (!) 170/88 (BP Location: Right arm, Patient Position: Sitting, Cuff Size: Adult Large)   Pulse 68   Resp 16   Ht 1.676 m (5' 6\")   Wt 100.7 kg (222 lb)   BMI 35.83 kg/m    BMI= Body mass index is 35.83 kg/m .  Wt Readings from Last 3 Encounters:   10/15/21 100.7 kg (222 lb)   09/16/21 99.8 kg (220 lb)   08/25/21 99.8 kg (220 lb)       General Appearance:   Alert, cooperative and in no acute distress.   ENT/Mouth: membranes moist, no facial drooping   EYES:  no scleral icterus, normal conjunctivae   Neck: no JVD   Chest/Lungs:   lungs are clear to auscultation, no rales or wheezing, respirations unlabored   Cardiovascular:   Regular. Normal first and second heart sounds with no murmurs, rubs, or gallops; the radial and posterior tibial pulses are intact, no edema bilateral lower extremities    Abdomen:  Soft, nontender, nondistended, bowel sounds present   Extremities: no cyanosis or clubbing   Skin: warm, dry.    Neurologic: mood and affect are appropriate, alert and oriented x3         Please refer above for cardiac ROS details.      Medical History  Surgical History Family History Social History   No past medical history on file.  Past Surgical History:   Procedure Laterality Date     AS REPAIR/GRAFT ACHILLES TENDON Right 2011     benign ovarian tumor removal  1999     C MOLE REMOVAL (LESIONS) TIER 1       wisdom teeth       Family History   Problem Relation Age of Onset     Heart Failure Mother      Emphysema Mother      Melanoma Mother      No Known Problems Father     Social History     Socioeconomic History     Marital status:      Spouse name: Not on file     Number of children: Not on file     Years of education: Not on file     Highest education level: Not on file   Occupational History     Not on file   Tobacco Use     Smoking status: Never Smoker     Smokeless tobacco: " Never Used   Substance and Sexual Activity     Alcohol use: Never     Drug use: Never     Sexual activity: Yes     Partners: Male   Other Topics Concern     Not on file   Social History Narrative     Not on file     Social Determinants of Health     Financial Resource Strain:      Difficulty of Paying Living Expenses:    Food Insecurity:      Worried About Running Out of Food in the Last Year:      Ran Out of Food in the Last Year:    Transportation Needs:      Lack of Transportation (Medical):      Lack of Transportation (Non-Medical):    Physical Activity:      Days of Exercise per Week:      Minutes of Exercise per Session:    Stress:      Feeling of Stress :    Social Connections:      Frequency of Communication with Friends and Family:      Frequency of Social Gatherings with Friends and Family:      Attends Temple Services:      Active Member of Clubs or Organizations:      Attends Club or Organization Meetings:      Marital Status:    Intimate Partner Violence:      Fear of Current or Ex-Partner:      Emotionally Abused:      Physically Abused:      Sexually Abused:           Medications  Allergies   Current Outpatient Medications   Medication Sig Dispense Refill     apixaban ANTICOAGULANT (ELIQUIS) 5 MG tablet Take 1 tablet (5 mg) by mouth 2 times daily 60 tablet 11     Ascorbic Acid (VITAMIN C PO) Take 500 mg by mouth daily        losartan-hydrochlorothiazide (HYZAAR) 50-12.5 MG tablet Take 1 tablet by mouth daily 90 tablet 3     metoprolol succinate ER (TOPROL-XL) 25 MG 24 hr tablet Take 1 tablet (25 mg) by mouth daily 30 tablet 3     rosuvastatin (CRESTOR) 5 MG tablet Take 1 tablet (5 mg) by mouth daily 90 tablet 3     vitamin D3 (CHOLECALCIFEROL) 2000 units (50 mcg) tablet Take 2,000 Units by mouth daily      Allergies   Allergen Reactions     Definity      Lisinopril Cough     Perflutren Lipid Microsphere [Propane]      Severe back pain     Seasonal Allergies          Lab Results    Chemistry/lipid  CBC Cardiac Enzymes/BNP/TSH/INR   Recent Labs   Lab Test 09/16/21  1107   CHOL 175   HDL 88   LDL 73   TRIG 68     Recent Labs   Lab Test 09/16/21  1107 09/06/20  0901 09/10/19  0851   LDL 73 85 158*     Recent Labs   Lab Test 09/16/21  1107      POTASSIUM 3.6   CHLORIDE 110*   CO2 27   GLC 87   BUN 14   CR 0.81   GFRESTIMATED 74   CONSTANTIN 8.1*     Recent Labs   Lab Test 09/16/21  1107 02/02/21  1628 09/06/20  0901   CR 0.81 0.90 0.84     No results for input(s): A1C in the last 97829 hours. Recent Labs   Lab Test 02/02/21  1628   WBC 6.9   HGB 13.5   HCT 40.4   MCV 91        Recent Labs   Lab Test 02/02/21  1628   HGB 13.5    No results for input(s): TROPONINI in the last 60223 hours.  No results for input(s): BNP, NTBNPI, NTBNP in the last 61402 hours.  Recent Labs   Lab Test 02/02/21  1628   TSH 0.76     No results for input(s): INR in the last 71483 hours.     Total Time- 60 minutes spent on date of encounter doing chart review, history and exam, documentation and further activities as noted above.  This note has been dictated using voice recognition software. Any grammatical, typographical, or context distortions are unintentional and inherent to the software.    MICHELL Velez [facility]        Thank you for allowing me to participate in the care of your patient.      Sincerely,     ASHOK Velez CNP [facility]  cc:   No referring provider defined for this encounter.

## 2021-10-18 ENCOUNTER — TELEPHONE (OUTPATIENT)
Dept: FAMILY MEDICINE | Facility: CLINIC | Age: 71
End: 2021-10-18

## 2021-10-18 ENCOUNTER — ALLIED HEALTH/NURSE VISIT (OUTPATIENT)
Dept: FAMILY MEDICINE | Facility: CLINIC | Age: 71
End: 2021-10-18
Payer: COMMERCIAL

## 2021-10-18 VITALS — SYSTOLIC BLOOD PRESSURE: 144 MMHG | HEART RATE: 64 BPM | DIASTOLIC BLOOD PRESSURE: 82 MMHG

## 2021-10-18 DIAGNOSIS — I10 ESSENTIAL HYPERTENSION: Primary | ICD-10-CM

## 2021-10-18 DIAGNOSIS — I10 ESSENTIAL HYPERTENSION: ICD-10-CM

## 2021-10-18 PROCEDURE — 99207 PR NO CHARGE NURSE ONLY: CPT

## 2021-10-18 RX ORDER — LOSARTAN POTASSIUM AND HYDROCHLOROTHIAZIDE 25; 100 MG/1; MG/1
1 TABLET ORAL DAILY
Qty: 90 TABLET | Refills: 3 | Status: SHIPPED | OUTPATIENT
Start: 2021-10-18 | End: 2022-09-20

## 2021-10-18 NOTE — TELEPHONE ENCOUNTER
Maria Esther Bains, ASHOK CNP  You; Ayleen Castro, DO 2 hours ago (12:37 PM)     MARGIE Hernández,   Thank you for the coordination of care.  It looks like her BP is still high.  Her pulse is at a good rate so I do not want to increase her beta blocker any more at this time.  I will defer any further BP medication adjustments to PCP.  Once her BP is consistently under 140/80 she can start the med and follow up with me in 3 months to reassess her heart rhythm.   Thank you   Maria Esther Bains CNP    Message text      Above message from cardiology relayed to patient with understanding voiced, then will await PCP's response re: further BP management and update patient again at that time, likely tomorrow.     Valeri Hernández RN  [facility]

## 2021-10-18 NOTE — PROGRESS NOTES
Kelly Mckeon is a 71 year old patient who comes in today for a Blood Pressure check because of new medication and ongoing blood pressure monitoring.  She states cardiology wants BP below 140/80 for 2 days prior to starting Eliquis.    Vital Signs as repeated by RN today:  144/82 with same result rechecked 5 minutes later.  Pulse 64.   Patient is taking medication as prescribed.  Patient is tolerating medications well.  Patient is monitoring Blood Pressure at home.  Average readings: 170 systolic Saturday, 150 systolic Sunday, then 150 today before medications.  Diastolic was between 80-90.   Current complaints: none  Disposition:  Forwarding to PCP and cardiology to advise regarding ongoing BP management and Eliquis.      Valeri Hernández RN  [facility]

## 2021-10-18 NOTE — TELEPHONE ENCOUNTER
Kelly Mckeon is a 71 year old patient who comes in today for a Blood Pressure check because of new medication and ongoing blood pressure monitoring.  She states cardiology wants BP below 140/80 for 2 days prior to starting Eliquis.    Vital Signs as repeated by RN today:  144/82 with same result rechecked 5 minutes later.  Pulse 64.   Patient is taking medication as prescribed.  Patient is tolerating medications well.  Patient is monitoring Blood Pressure at home.  Average readings: 170 systolic Saturday, 150 systolic Sunday, then 150 today before medications.  Diastolic was between 80-90.   Current complaints: none  Disposition:  Forwarding to PCP and cardiology to advise regarding ongoing BP management and Eliquis.      Valeri Hernández RN  [facility]

## 2021-10-20 NOTE — TELEPHONE ENCOUNTER
Called pt & read providers message. Pt states BP yesterday a.m.: 143/77 before meds, pm: 111/70  Today a.m.: 148/86 before meds  Pt verbalized understanding of med dose change & will continue to monitor BP BID, call with any changes & will schedule RN BP check in 1 week.    Saray Padron RN

## 2021-10-27 ENCOUNTER — TELEPHONE (OUTPATIENT)
Dept: FAMILY MEDICINE | Facility: CLINIC | Age: 71
End: 2021-10-27

## 2021-10-27 ENCOUNTER — ALLIED HEALTH/NURSE VISIT (OUTPATIENT)
Dept: FAMILY MEDICINE | Facility: CLINIC | Age: 71
End: 2021-10-27
Payer: COMMERCIAL

## 2021-10-27 VITALS — SYSTOLIC BLOOD PRESSURE: 140 MMHG | HEART RATE: 72 BPM | DIASTOLIC BLOOD PRESSURE: 80 MMHG

## 2021-10-27 DIAGNOSIS — I10 ESSENTIAL HYPERTENSION: Primary | ICD-10-CM

## 2021-10-27 PROCEDURE — 99207 PR NO CHARGE NURSE ONLY: CPT

## 2021-10-27 NOTE — TELEPHONE ENCOUNTER
Kelly Mckeon is a 71 year old year old patient who comes in today for a Blood Pressure check because of medication change and ongoing blood pressure monitoring. Losartan-hydrochlorothiazide dose increased on 10/16/21.   Vital Signs as repeated by /88,  140/80  Patient is taking medication as prescribed  Patient is tolerating medications well.  Patient is monitoring Blood Pressure at home.  Average readings if yes are 107-150/72-84  Current complaints: none  Disposition:  patient instructed to continue monitoring BP at home. Notify care team if consistently >140/90  Patient instructed  to start eliquis once SBP was <140 so patient started on 10/24/21.   Will route to PCP to review.  Sharon MOLINA RN

## 2021-10-27 NOTE — TELEPHONE ENCOUNTER
Agree with starting the Eliquis.  Continue medications unchanged.  The blood pressure is much improved.  If she is seeing more than 1/3 of her home readings above 140, she should follow-up with me.  She can check her blood pressure 2-3 times per week

## 2021-12-01 DIAGNOSIS — E78.5 HYPERLIPIDEMIA LDL GOAL <130: ICD-10-CM

## 2021-12-01 RX ORDER — ROSUVASTATIN CALCIUM 5 MG/1
5 TABLET, COATED ORAL DAILY
Qty: 90 TABLET | Refills: 3 | Status: CANCELLED | OUTPATIENT
Start: 2021-12-01

## 2021-12-09 ENCOUNTER — TELEPHONE (OUTPATIENT)
Dept: CARDIOLOGY | Facility: CLINIC | Age: 71
End: 2021-12-09
Payer: COMMERCIAL

## 2021-12-09 NOTE — TELEPHONE ENCOUNTER
PC back from pt  She reports that she is having tooth pain, and is wondering what to take for the pain with her being on Eliquis  Discussed with pt Tylenol is safe for her to take, and to avoid NSAIDs on NOAC medications  She is thinking she may need the tooth extracted  She is wondering if this would be aok to do on ELiquis  Advised pt she would need to discuss with her dentist or oral surgeon, and advised pt that most tooth extractions can be done without interruption in Eliquis  She was instructed to CB if they would require interruption  Pt verbalized understanding, has no further questions or concerns at this time, and has our contact information if needed.  12/9/2021 9:35 AM  Ruth Waters RN      ----- Message from Marielena Penny sent at 12/9/2021  9:13 AM FRANCISCO -----  Regarding: MARGIE  General phone call:    Caller: Kelly  Primary cardiologist: MARGIE  Detailed reason for call: Pt is calling and would like to know what she can take for pain medications    Best phone number: [phone]  Best time to contact: anytime  Ok to leave a detailedmessage? yes  Device? no    Additional Info:

## 2021-12-13 ENCOUNTER — VIRTUAL VISIT (OUTPATIENT)
Dept: SLEEP MEDICINE | Facility: CLINIC | Age: 71
End: 2021-12-13
Payer: COMMERCIAL

## 2021-12-13 DIAGNOSIS — G47.33 OSA (OBSTRUCTIVE SLEEP APNEA): Primary | ICD-10-CM

## 2021-12-13 PROCEDURE — 99203 OFFICE O/P NEW LOW 30 MIN: CPT | Mod: GT | Performed by: PHYSICIAN ASSISTANT

## 2021-12-13 NOTE — PROGRESS NOTES
Does Kelly have a CPAP/Bipap?  No     [facility] Sleep Scale: 5    Kelly is a 71 year old who is being evaluated via a billable video visit.      How would you like to obtain your AVS? MyChart  If the video visit is dropped, the invitation should be resent by: Text to cell phone: [phone]  Will anyone else be joining your video visit? No      Video Start Time: 2:12 PM        Subjective   Kelly is a 71 year old who presents for the following health issues snoring.     Vitals:  No vitals were obtained today due to virtual visit.    Video-Visit Details    Type of service:  Video Visit    Video End Time:2:35 PM    Originating Location (pt. Location): Home    [facility]     Platform used for Video Visit: Buzztala        Sleep Consultation:    Date on this visit: 12/13/2021    Kelly Mckeon is sent by Maria Esther Bains for a sleep consultation regarding afib, .    Primary Physician: Ayleen Castro     Kelly Mckeon reports occasional snoring, afib for 1 year.     Kelly goes to sleep at 10:30 PM during the week. She wakes up at 6:30 AM without an alarm. She falls asleep in 5 minutes.  Kelly denies difficulty falling asleep.  She wakes up 1-2 times a night for 5 minutes before falling back to sleep.  Kelly wakes up to go to the bathroom.  On weekends, Kelly keeps a similar sleep schedule  Patient gets an average of 7 hours of sleep per night.     Patient does not use electronics in bed, watch TV in bed and read in bed.     Kelly does not do shift work.  Kelly does snore some nights. Patient does have a regular bed partner. There is report of snoring.  She does have witnessed apneas. They never sleep separately.  Patient sleeps on her back and side. She has occasional snort arousals, morning dry mouth and restless legs,. Kelly denies any bruxism, sleep walking, sleep talking, dream enactment, sleep paralysis, cataplexy and hypnogogic/hypnopompic  hallucinations.    She confirms sleep walking, sleep talking and enuresis as a child.  Kelly denies difficulty breathing through her nose, claustrophobia, reflux at night, heartburn and depression.      Kelly has gained 0-5 pounds in the last year.  Patient describes themself as a morning person.  She would prefer to go to sleep at 10:30 PM and wake up at 6:30 AM.  Patient's [place] Sleepiness score 5/24 inconsistent with excessive  daytime sleepiness.      Kelly naps 0 times per week for  minutes, feels unrefreshed after naps. She takes no inadvertant naps.  She denies closing eyes, dozing and falling asleep while driving.   Patient was counseled on the importance of driving while alert, to pull over if drowsy, or nap before getting into the vehicle if sleepy.  She uses no caffeine.     Allergies:    Allergies   Allergen Reactions     Definity      Lisinopril Cough     Perflutren Lipid Microsphere [Propane]      Severe back pain     Seasonal Allergies        Medications:    Current Outpatient Medications   Medication Sig Dispense Refill     apixaban ANTICOAGULANT (ELIQUIS) 5 MG tablet Take 1 tablet (5 mg) by mouth 2 times daily 60 tablet 11     Ascorbic Acid (VITAMIN C PO) Take 500 mg by mouth daily        losartan-hydrochlorothiazide (HYZAAR) 100-25 MG tablet Take 1 tablet by mouth daily 90 tablet 3     metoprolol succinate ER (TOPROL-XL) 25 MG 24 hr tablet Take 1 tablet (25 mg) by mouth daily 30 tablet 3     rosuvastatin (CRESTOR) 5 MG tablet Take 1 tablet (5 mg) by mouth daily 90 tablet 3     vitamin D3 (CHOLECALCIFEROL) 2000 units (50 mcg) tablet Take 2,000 Units by mouth daily         Problem List:  Patient Active Problem List    Diagnosis Date Noted     Obesity (BMI 35.0-39.9) with comorbidity (H) 09/10/2019     Priority: Medium     Peripheral sensory neuropathy 06/28/2017     Priority: Medium     Isabel's cyst 01/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 I choose not to at this time.\"   8-12       Hyperlipidemia LDL goal <130 02/24/2011     Priority: Medium     Patient strongly wants to be off of statin therapy. Her LDL prior to starting was quite high at 188. She will try Mediterranean diet with 6 capsules per day of RRY extract, then recheck lipid panel.  8-12    Intolerant to pravastatin       Vitamin D deficiency 02/24/2011     Priority: Medium     On supplement but not sure if she's taking 1000 or 2000 units per day. Patient will call with dose.   2-11       Essential hypertension 01/12/2010     Priority: Medium     Personal history of colonic polyps 09/06/2006     Priority: Medium     Due for colonoscopy in 2020          Past Medical/Surgical History:  No past medical history on file.  Past Surgical History:   Procedure Laterality Date     AS REPAIR/GRAFT ACHILLES TENDON Right 2011     benign ovarian tumor removal  1999     C MOLE REMOVAL (LESIONS) TIER 1       wisdom teeth         Social History:  Social History     Socioeconomic History     Marital status:      Spouse name: Not on file     Number of children: Not on file     Years of education: Not on file     Highest education level: Not on file   Occupational History     Not on file   Tobacco Use     Smoking status: Never Smoker     Smokeless tobacco: Never Used   Substance and Sexual Activity     Alcohol use: Never     Drug use: Never     Sexual activity: Yes     Partners: Male   Other Topics Concern     Not on file   Social History Narrative     Not on file     Social Determinants of Health     Financial Resource Strain: Not on file   Food Insecurity: Not on file   Transportation Needs: Not on file   Physical Activity: Not on file   Stress: Not on file   Social Connections: Not on file   Intimate Partner Violence: Not on file   Housing Stability: Not on file       Family History:  Family History   Problem Relation Age of Onset     Heart Failure Mother      " Emphysema Mother      Melanoma Mother      No Known Problems Father          Impression/Plan:  Snoring, witnessed apneas, in a patient with AFIB, htn, obesity. We will do a home study for suspected sleep apnea.   Literature provided regarding sleep apnea.      She will follow up with me in approximately two weeks after her sleep study has been competed to review the results and discuss plan of care.       Polysomnography reviewed.  Obstructive sleep apnea reviewed.  Complications of untreated sleep apnea were reviewed.        CC: Maria Esther Bains

## 2021-12-13 NOTE — PATIENT INSTRUCTIONS
"      MY TREATMENT INFORMATION FOR SLEEP APNEA-  Kelly Mckeon    DOCTOR : ARCELIA Donato-CRAIG    Am I having a sleep study at a sleep center?  --->Due to insurance clearance delays, you will be contacted within 2-4 weeks to schedule    Am I having a home sleep study?  --->Watch the video for the device you are using:    -/drop off device-   https://www.[employer].com/watch?v=yGGFBdELGhk    -Disposable device sent out require phone/computer application-   https://www.[employer].com/watch?v=BCce_vbiwxE      Frequently asked questions:  1. What is Obstructive Sleep Apnea (VIOLET)? VIOLET is the most common type of sleep apnea. Apnea means, \"without breath.\"  Apnea is most often caused by narrowing or collapse of the upper airway as muscles relax during sleep.   Almost everyone has occasional apneas. Most people with sleep apnea have had brief interruptions at night frequently for many years.  The severity of sleep apnea is related to how frequent and severe the events are.   2. What are the consequences of VIOLET? Symptoms include: feeling sleepy during the day, snoring loudly, gasping or stopping of breathing, trouble sleeping, and occasionally morning headaches or heartburn at night.  Sleepiness can be serious and even increase the risk of falling asleep while driving. Other health consequences may include development of high blood pressure and other cardiovascular disease in persons who are susceptible. Untreated VIOLET  can contribute to heart disease, stroke and diabetes.   3. What are the treatment options? In most situations, sleep apnea is a lifelong disease that must be managed with daily therapy. Medications are not effective for sleep apnea and surgery is generally not considered until other therapies have been tried. Your treatment is your choice . Continuous Positive Airway (CPAP) works right away and is the therapy that is effective in nearly everyone. An oral device to hold your jaw forward is usually the " next most reliable option. Other options include postioning devices (to keep you off your back), weight loss, and surgery including a tongue pacing device. There is more detail about some of these options below.  4. Are my sleep studies covered by insurance? Although we will request verification of coverage, we advise you also check in advance of the study to ensure there is coverage.    Important tips for those choosing CPAP and similar devices   Know your equipment:  CPAP is continuous positive airway pressure that prevents obstructive sleep apnea by keeping the throat from collapsing while you are sleeping. In most cases, the device is  smart  and can slowly self-adjusts if your throat collapses and keeps a record every day of how well you are treated-this information is available to you and your care team.  BPAP is bilevel positive airway pressure that keeps your throat open and also assists each breath with a pressure boost to maintain adequate breathing.  Special kinds of BPAP are used in patients who have inadequate breathing from lung or heart disease. In most cases, the device is  smart  and can slowly self-adjusts to assist breathing. Like CPAP, the device keeps a record of how well you are treated.  Your mask is your connection to the device. You get to choose what feels most comfortable and the staff will help to make sure if fits. Here: are some examples of the different masks that are available:       Key points to remember on your journey with sleep apnea:  1. Sleep study.  PAP devices often need to be adjusted during a sleep study to show that they are effective and adjusted right.  2. Good tips to remember: Try wearing just the mask during a quiet time during the day so your body adapts to wearing it. A humidifier is recommended for comfort in most cases to prevent drying of your nose and throat. Allergy medication from your provider may help you if you are having nasal congestion.  3. Getting  settled-in. It takes more than one night for most of us to get used to wearing a mask. Try wearing just the mask during a quiet time during the day so your body adapts to wearing it. A humidifier is recommended for comfort in most cases. Our team will work with you carefully on the first day and will be in contact within 4 days and again at 2 and 4 weeks for advice and remote device adjustments. Your therapy is evaluated by the device each day.   4. Use it every night. The more you are able to sleep naturally for 7-8 hours, the more likely you will have good sleep and to prevent health risks or symptoms from sleep apnea. Even if you use it 4 hours it helps. Occasionally all of us are unable to use a medical therapy, in sleep apnea, it is not dangerous to miss one night.   5. Communicate. Call our skilled team on the number provided on the first day if your visit for problems that make it difficult to wear the device. Over 2 out of 3 patients can learn to wear the device long-term with help from our team. Remember to call our team or your sleep providers if you are unable to wear the device as we may have other solutions for those who cannot adapt to mask CPAP therapy. It is recommended that you sleep your sleep provider within the first 3 months and yearly after that if you are not having problems.   6. Use it for your health. We encourage use of CPAP masks during daytime quiet periods to allow your face and brain to adapt to the sensation of CPAP so that it will be a more natural sensation to awaken to at night or during naps. This can be very useful during the first few weeks or months of adapting to CPAP though it does not help medically to wear CPAP during wakefulness and  should not be used as a strategy just to meet guidelines.  7. Take care of your equipment. Make sure you clean your mask and tubing using directions every day and that your filter and mask are replaced as recommended or if they are not  working.     BESIDES CPAP, WHAT OTHER THERAPIES ARE THERE?    Positioning Device  Positioning devices are generally used when sleep apnea is mild and only occurs on your back.This example shows a pillow that straps around the waist. It may be appropriate for those whose sleep study shows milder sleep apnea that occurs primarily when lying flat on one's back. Preliminary studies have shown benefit but effectiveness at home may need to be verified by a home sleep test. These devices are generally not covered by medical insurance.  Examples of devices that maintain sleeping on the back to prevent snoring and mild sleep apnea.    Belt type body positioner  http://Xuehuile.[employer]ipper/    Electronic reminder  http://nightshifttherapy.com/  http://www.[employer]ftware.Quipper.au/      Oral Appliance  What is oral appliance therapy?  An oral appliance device fits on your teeth at night like a retainer used after having braces. The device is made by a specialized dentist and requires several visits over 1-2 months before a manufactured device is made to fit your teeth and is adjusted to prevent your sleep apnea. Once an oral device is working properly, snoring should be improved. A home sleep test may be recommended at that time if to determine whether the sleep apnea is adequately treated.       Some things to remember:  -Oral devices are often, but not always, covered by your medical insurance. Be sure to check with your insurance provider.   -If you are referred for oral therapy, you will be given a list of specialized dentists to consider or you may choose to visit the Web site of the American Academy of Dental Sleep Medicine  -Oral devices are less likely to work if you have severe sleep apnea or are extremely overweight.     More detailed information  An oral appliance is a small acrylic device that fits over the upper and lower teeth  (similar to a retainer or a mouth guard). This device slightly moves jaw forward, which moves the base  of the tongue forward, opens the airway, improves breathing for effective treat snoring and obstructive sleep apnea in perhaps 7 out of 10 people .  The best working devices are custom-made by a dental device  after a mold is made of the teeth 1, 2, 3.  When is an oral appliance indicated?  Oral appliance therapy is recommended as a first-line treatment for patients with primary snoring, mild sleep apnea, and for patients with moderate sleep apnea who prefer appliance therapy to use of CPAP4, 5. Severity of sleep apnea is determined by sleep testing and is based on the number of respiratory events per hour of sleep.   How successful is oral appliance therapy?  The success rate of oral appliance therapy in patients with mild sleep apnea is 75-80% while in patients with moderate sleep apnea it is 50-70%. The chance of success in patients with severe sleep apnea is 40-50%. The research also shows that oral appliances have a beneficial effect on the cardiovascular health of VIOLET patients at the same magnitude as CPAP therapy7.  Oral appliances should be a second-line treatment in cases of severe sleep apnea, but if not completely successful then a combination therapy utilizing CPAP plus oral appliance therapy may be effective. Oral appliances tend to be effective in a broad range of patients although studies show that the patients who have the highest success are females, younger patients, those with milder disease, and less severe obesity. 3, 6.   Finding a dentist that practices dental sleep medicine  Specific training is available through the American Academy of Dental Sleep Medicine for dentists interested in working in the field of sleep. To find a dentist who is educated in the field of sleep and the use of oral appliances, near you, visit the Web site of the American Academy of Dental Sleep Medicine.    References  1. maisha Mejia al. Objectively measured vs self-reported compliance during oral  appliance therapy for sleep-disordered breathing. Chest 2013; 144(5): 0324-4504.  2. Mayra, et al. Objective measurement of compliance during oral appliance therapy for sleep-disordered breathing. Thorax 2013; 68(1): 91-96.  3. Angie et al. Mandibular advancement devices in 620 men and women with VIOLET and snoring: tolerability and predictors of treatment success. Chest 2004; 125: 0263-0382.  4. Anthony, et al. Oral appliances for snoring and VIOLET: a review. Sleep 2006; 29: 244-262.  5. Oral et al. Oral appliance treatment for VIOLET: an update. J Clin Sleep Med 2014; 10(2): 215-227.  6. Curry et al. Predictors of OSAH treatment outcome. J Dent Res 2007; 86: 1346-5888.      Weight Loss:    Weight loss is a long-term strategy that may improve sleep apnea in some patients.    Weight management is a personal decision and the decision should be based on your interest and the potential benefits.  If you are interested in exploring weight loss strategies, the following discussion covers the impact on weight loss on sleep apnea and the approaches that may be successful.    Being overweight does not necessarily mean you will have health consequences.  Those who have BMI over 35 or over 27 with existing medical conditions carries greater risk.   Weight loss decreases severity of sleep apnea in most people with obesity. For those with mild obesity who have developed snoring with weight gain, even 15-30 pound weight loss can improve and occasionally eliminate sleep apnea.  Structured and life-long dietary and health habits are necessary to lose weight and keep healthier weight levels.     Though there may be significant health benefits from weight loss, long-term weight loss is very difficult to achieve- studies show success with dietary management in less than 10% of people. In addition, substantial weight loss may require years of dietary control and may be difficult if patients have severe obesity. In these  cases, surgical management may be considered.  Finally, older individuals who have tolerated obesity without health complications may be less likely to benefit from weight loss strategies.        Your BMI is There is no height or weight on file to calculate BMI.  Weight management is a personal decision.  If you are interested in exploring weight loss strategies, the following discussion covers the approaches that may be successful. Body mass index (BMI) is one way to tell whether you are at a healthy weight, overweight, or obese. It measures your weight in relation to your height.  A BMI of 18.5 to 24.9 is in the healthy range. A person with a BMI of 25 to 29.9 is considered overweight, and someone with a BMI of 30 or greater is considered obese. More than two-thirds of American adults are considered overweight or obese.  Being overweight or obese increases the risk for further weight gain. Excess weight may lead to heart disease and diabetes.  Creating and following plans for healthy eating and physical activity may help you improve your health.  Weight control is part of healthy lifestyle and includes exercise, emotional health, and healthy eating habits. Careful eating habits lifelong are the mainstay of weight control. Though there are significant health benefits from weight loss, long-term weight loss with diet alone may be very difficult to achieve- studies show long-term success with dietary management in less than 10% of people. Attaining a healthy weight may be especially difficult to achieve in those with severe obesity. In some cases, medications, devices and surgical management might be considered.  What can you do?  If you are overweight or obese and are interested in methods for weight loss, you should discuss this with your provider.     Consider reducing daily calorie intake by 500 calories.     Keep a food journal.     Avoiding skipping meals, consider cutting portions instead.    Diet combined  with exercise helps maintain muscle while optimizing fat loss. Strength training is particularly important for building and maintaining muscle mass. Exercise helps reduce stress, increase energy, and improves fitness. Increasing exercise without diet control, however, may not burn enough calories to loose weight.       Start walking three days a week 10-20 minutes at a time    Work towards walking thirty minutes five days a week     Eventually, increase the speed of your walking for 1-2 minutes at time    In addition, we recommend that you review healthy lifestyles and methods for weight loss available through the National Institutes of Health patient information sites:  http://win.niddk.nih.gov/publications/index.htm    And look into health and wellness programs that may be available through your health insurance provider, employer, local community center, or jelly club.          Surgery:    Surgery for obstructive sleep apnea is considered generally only when other therapies fail to work. Surgery may be discussed with you if you are having a difficult time tolerating CPAP and or when there is an abnormal structure that requires surgical correction.  Nose and throat surgeries often enlarge the airway to prevent collapse.  Most of these surgeries create pain for 1-2 weeks and up to half of the most common surgeries are not effective throughout life.  You should carefully discuss the benefits and drawbacks to surgery with your sleep provider and surgeon to determine if it is the best solution for you.   More information  Surgery for VIOLET is directed at areas that are responsible for narrowing or complete obstruction of the airway during sleep.  There are a wide range of procedures available to enlarge and/or stabilize the airway to prevent blockage of breathing in the three major areas where it can occur: the palate, tongue, and nasal regions.  Successful surgical treatment depends on the accurate identification of  the factors responsible for obstructive sleep apnea in each person.  A personalized approach is required because there is no single treatment that works well for everyone.  Because of anatomic variation, consultation with an examination by a sleep surgeon is a critical first step in determining what surgical options are best for each patient.  In some cases, examination during sedation may be recommended in order to guide the selection of procedures.  Patients will be counseled about risks and benefits as well as the typical recovery course after surgery. Surgery is typically not a cure for a person s VIOLET.  However, surgery will often significantly improve one s VIOLET severity (termed  success rate ).  Even in the absence of a cure, surgery will decrease the cardiovascular risk associated with OSA7; improve overall quality of life8 (sleepiness, functionality, sleep quality, etc).      Palate Procedures:  Patients with VIOLET often have narrowing of their airway in the region of their tonsils and uvula.  The goals of palate procedures are to widen the airway in this region as well as to help the tissues resist collapse.  Modern palate procedure techniques focus on tissue conservation and soft tissue rearrangement, rather than tissue removal.  Often the uvula is preserved in this procedure. Residual sleep apnea is common in patient after pharyngoplasty with an average reduction in sleep apnea events of 33%2.      Tongue Procedures:  ExamWhile patients are awake, the muscles that surround the throat are active and keep this region open for breathing. These muscles relax during sleep, allowing the tongue and other structures to collapse and block breathing.  There are several different tongue procedures available.  Selection of a tongue base procedure depends on characteristics seen on physical exam.  Generally, procedures are aimed at removing bulky tissues in this area or preventing the back of the tongue from falling back  during sleep.  Success rates for tongue surgery range from 50-62%3.    Hypoglossal Nerve Stimulation:  Hypoglossal nerve stimulation has recently received approval from the [place] Food and Drug Administration for the treatment of obstructive sleep apnea.  This is based on research showing that the system was safe and effective in treating sleep apnea6.  Results showed that the median AHI score decreased 68%, from 29.3 to 9.0. This therapy uses an implant system that senses breathing patterns and delivers mild stimulation to airway muscles, which keeps the airway open during sleep.  The system consists of three fully implanted components: a small generator (similar in size to a pacemaker), a breathing sensor, and a stimulation lead.  Using a small handheld remote, a patient turns the therapy on before bed and off upon awakening.    Candidates for this device must be greater than 22 years of age, have moderate to severe VIOLET (AHI between 20-65), BMI less than 32, have tried CPAP/oral appliance without success, and have appropriate upper airway anatomy (determined by a sleep endoscopy performed by Dr. Cabrera).    Hypoglossal Nerve Stimulation Pathway:    The sleep surgeon s office will work with the patient through the insurance prior-authorization process (including communications and appeals).    Nasal Procedures:  Nasal obstruction can interfere with nasal breathing during the day and night.  Studies have shown that relief of nasal obstruction can improve the ability of some patients to tolerate positive airway pressure therapy for obstructive sleep apnea1.  Treatment options include medications such as nasal saline, topical corticosteroid and antihistamine sprays, and oral medications such as antihistamines or decongestants. Non-surgical treatments can include external nasal dilators for selected patients. If these are not successful by themselves, surgery can improve the nasal airway either alone or in  combination with these other options.      Combination Procedures:  Combination of surgical procedures and other treatments may be recommended, particularly if patients have more than one area of narrowing or persistent positional disease.  The success rate of combination surgery ranges from 66-80%2,3.    References  1. Homero SRINIVASAN. The Role of the Nose in Snoring and Obstructive Sleep Apnoea: An Update.  Eur Arch Otorhinolaryngol. 2011; 268: 1365-73.  2.  Marisela SM; Sukh JA; Caty JR; Pallanch JF; Teddy MB; Johanne SG; Jeffrey LEAHY. Surgical modifications of the upper airway for obstructive sleep apnea in adults: a systematic review and meta-analysis. SLEEP 2010;33(10):4365-6227. Ganesh LEAVITT. Hypopharyngeal surgery in obstructive sleep apnea: an evidence-based medicine review.  Arch Otolaryngol Head Neck Surg. 2006 Feb;132(2):206-13.  3. Bul YH1, Montez Y, Jose FREDRICK. The efficacy of anatomically based multilevel surgery for obstructive sleep apnea. Otolaryngol Head Neck Surg. 2003 Oct;129(4):327-35.  4. Ganesh LEAVITT, Goldberg A. Hypopharyngeal Surgery in Obstructive Sleep Apnea: An Evidence-Based Medicine Review. Arch Otolaryngol Head Neck Surg. 2006 Feb;132(2):206-13.  5. Lisa CAPPS et al. Upper-Airway Stimulation for Obstructive Sleep Apnea.  N Engl J Med. 2014 Jan 9;370(2):139-49.  6. Robb Y et al. Increased Incidence of Cardiovascular Disease in Middle-aged Men with Obstructive Sleep Apnea. Am J Respir Crit Care Med; 2002 166: 159-165  7. Matias EM et al. Studying Life Effects and Effectiveness of Palatopharyngoplasty (SLEEP) study: Subjective Outcomes of Isolated Uvulopalatopharyngoplasty. Otolaryngol Head Neck Surg. 2011; 144: 623-631.        WHAT IF I ONLY HAVE SNORING?      Mandibular advancement devices, lateral sleep positioning, long-term weight loss and treatment of nasal allergies has been shown to improve snoring.    Exercising tongue muscles with a game (https://www.ncbi.nlm.nih.gov/pubmed/87336903) or  stimulating the tongue during the day with a device (https://doi.org/10.3390/iso25425788) have t snoring    Remember to Drive Safe... Drive Alive     Sleep health profoundly affects your health, mood, and your safety.  Thirty three percent of the population (one in three of us) is not getting enough sleep and many have a sleep disorder. Not getting enough sleep or having an untreated / undertreated sleep condition may make us sleepy without even knowing it. In fact, our driving could be dramatically impaired due to our sleep health. As your provider, here are some things I would like you to know about driving:     Here are some warning signs for impairment and dangerous drowsy driving:              -Having been awake more than 16 hours               -Looking tired               -Eyelid drooping              -Head nodding (it could be too late at this point)              -Driving for more than 30 minutes     Some things you could do to make the driving safer if you are experiencing some drowsiness:              -Stop driving and rest              -Call for transportation              -Make sure your sleep disorder is adequately treated     Some things that have been shown NOT to work when experiencing drowsiness while driving:              -Turning on the radio              -Opening windows              -Eating any  distracting  /  entertaining  foods (e.g., sunflower seeds, candy, or any other)              -Talking on the phone      Your decision may not only impact your life, but also the life of others. Please, remember to drive safe for yourself and all of us.

## 2022-01-24 ENCOUNTER — MYC MEDICAL ADVICE (OUTPATIENT)
Dept: SLEEP MEDICINE | Facility: CLINIC | Age: 72
End: 2022-01-24
Payer: COMMERCIAL

## 2022-01-27 ENCOUNTER — OFFICE VISIT (OUTPATIENT)
Dept: CARDIOLOGY | Facility: CLINIC | Age: 72
End: 2022-01-27
Attending: NURSE PRACTITIONER
Payer: COMMERCIAL

## 2022-01-27 VITALS
WEIGHT: 227.8 LBS | DIASTOLIC BLOOD PRESSURE: 86 MMHG | RESPIRATION RATE: 20 BRPM | BODY MASS INDEX: 36.61 KG/M2 | HEART RATE: 68 BPM | SYSTOLIC BLOOD PRESSURE: 140 MMHG | HEIGHT: 66 IN

## 2022-01-27 DIAGNOSIS — I48.0 PAROXYSMAL ATRIAL FIBRILLATION (H): Primary | ICD-10-CM

## 2022-01-27 DIAGNOSIS — I10 ESSENTIAL HYPERTENSION: ICD-10-CM

## 2022-01-27 PROCEDURE — 99214 OFFICE O/P EST MOD 30 MIN: CPT | Performed by: NURSE PRACTITIONER

## 2022-01-27 ASSESSMENT — MIFFLIN-ST. JEOR: SCORE: 1570.04

## 2022-01-27 NOTE — LETTER
1/27/2022    Ayleen Castro, DO  [address] [facility] [address]    RE: Kelly YANG Mike       Dear Colleague,     I had the pleasure of seeing Kelly Mckeon in the [facility].      Assessment/Recommendations     Assessment:      1.  Paroxysmal atrial fibrillation-patient underwent work-up for palpitations; wore a 30-day heart monitor that captured paroxysmal atrial fibrillation.  Patient was started on metoprolol, and has been seen in follow-up for uncontrolled hypertension which is now controlled, and returns to the clinic today for follow-up.  She has had no further atrial fibrillation since starting metoprolol    -Pathophysiology and chronic nature of atrial fibrillation discussed with patient and her .  Rate versus rhythm control management approaches were discussed.  [id number] scoring system and elevated risk of thromboembolism/stroke discussed.  Triggers of atrial fibrillation also discussed.  -Continue metoprolol succinate 25 mg oral daily  -Continue Eliquis 5 mg p.o. twice daily  -Continue to keep a log of episodes of arrhythmia     2.  Hypertension-    -continue metoprolol and Hyzaar  -Continue daily exercise and weight reduction efforts  -Continue low salt diet  -Patient has made lifestyle modifications and is doing very well     3.  Probable sleep apnea-according to the patient's  patient has severe snoring.  Educated patient and  that atrial fibrillation is more difficult to manage if patient has untreated sleep apnea.     -Sleep evaluation ordered        [id number] score of 3: 1 gender, 1 age, 1 hypertension and on we will start Eliquis 5 mg p.o. twice daily once her blood pressure is well managed.     Kelly S Mike will follow up Maria Esther Bains 6 months after seeing Dr. Rivera or sooner if needed       History of Present Illness/Subjective    Ms. Kelly Mckeon is a 71 year old female seen at [facility] today  for paroxysmal atrial fibrillation.      Kelly Mckeon has a known history of neuropathy, obesity, hyperlipidemia, hypertension, loud snoring with VIOLET work-up in place.    Met with Kelly today to discuss atrial fibrillation-she has had no further recurrence of atrial fibrillation after starting metoprolol, gaining control of her blood pressure, and participating in daily exercise.  When she experiences atrial fibrillation she has increased awareness of heartbeat and palpitations.  She denies fatigue, lightheadedness, shortness of breath, dyspnea on exertion, orthopnea, PND, palpitations, chest pain, abdominal fullness/bloating and lower extremity edema.      Cardiographics (reviewed):  ECHO  Echo result w/o MOPS: Interpretation Summary 1.Left ventricular size, wall motion and function are normal. The ejectionfraction is 60-65%, visualy hyperdynamic.2.TAPSE is normal, which is consistent with normal right ventricular systolicfunction.3.No hemodynamically significant valvular abnormalities on 2D or color flowimaging.4.There is no comparison study available.     9/10/2021 cardiac event monitor  Patient wore monitor 9/10/2021 through 10/9/2021     Impression  Predominantly sinus rhythm.  Paroxysmal atrial fibrillation - single episode lasting 2hrs 26min associated with borderline high average ventricular rate.  Symptom triggers (3) correlated to atrial fibrillation, sinus rhythm with isolated PAC.       Physical Examination Review of Systems   Vitals: There were no vitals taken for this visit.  BMI= There is no height or weight on file to calculate BMI.  Wt Readings from Last 3 Encounters:   10/15/21 100.7 kg (222 lb)   09/16/21 99.8 kg (220 lb)   08/25/21 99.8 kg (220 lb)       General Appearance:   Alert, cooperative and in no acute distress.   ENT/Mouth: membranes moist, no facial drooping   EYES:  no scleral icterus, normal conjunctivae   Neck: no JVD   Chest/Lungs:   lungs are clear to auscultation, no rales  or wheezing, respirations unlabored   Cardiovascular:   Regular. Normal first and second heart sounds with no murmurs, rubs, or gallops; the radial and posterior tibial pulses are intact, no edema bilateral lower extremities    Abdomen:  Soft, nontender, nondistended, bowel sounds present   Extremities: no cyanosis or clubbing   Skin: warm, dry.    Neurologic: mood and affect are appropriate, alert and oriented x3         Please refer above for cardiac ROS details.      Medical History  Surgical History Family History Social History   No past medical history on file.  Past Surgical History:   Procedure Laterality Date     AS REPAIR/GRAFT ACHILLES TENDON Right 2011     benign ovarian tumor removal  1999     wisdom teeth       ZZC MOLE REMOVAL (LESIONS) TIER 1       Family History   Problem Relation Age of Onset     Heart Failure Mother      Emphysema Mother      Melanoma Mother      No Known Problems Father     Social History     Socioeconomic History     Marital status:      Spouse name: Not on file     Number of children: Not on file     Years of education: Not on file     Highest education level: Not on file   Occupational History     Not on file   Tobacco Use     Smoking status: Never Smoker     Smokeless tobacco: Never Used   Substance and Sexual Activity     Alcohol use: Never     Drug use: Never     Sexual activity: Yes     Partners: Male   Other Topics Concern     Not on file   Social History Narrative     Not on file     Social Determinants of Health     Financial Resource Strain: Not on file   Food Insecurity: Not on file   Transportation Needs: Not on file   Physical Activity: Not on file   Stress: Not on file   Social Connections: Not on file   Intimate Partner Violence: Not on file   Housing Stability: Not on file          Medications  Allergies   Current Outpatient Medications   Medication Sig Dispense Refill     apixaban ANTICOAGULANT (ELIQUIS) 5 MG tablet Take 1 tablet (5 mg) by mouth 2 times  daily 60 tablet 11     Ascorbic Acid (VITAMIN C PO) Take 500 mg by mouth daily        losartan-hydrochlorothiazide (HYZAAR) 100-25 MG tablet Take 1 tablet by mouth daily 90 tablet 3     metoprolol succinate ER (TOPROL-XL) 25 MG 24 hr tablet Take 1 tablet (25 mg) by mouth daily 30 tablet 3     rosuvastatin (CRESTOR) 5 MG tablet Take 1 tablet (5 mg) by mouth daily 90 tablet 3     vitamin D3 (CHOLECALCIFEROL) 2000 units (50 mcg) tablet Take 2,000 Units by mouth daily      Allergies   Allergen Reactions     Definity      Lisinopril Cough     Perflutren Lipid Microsphere [Propane]      Severe back pain     Seasonal Allergies          Lab Results    Chemistry/lipid CBC Cardiac Enzymes/BNP/TSH/INR   Lab Results   Component Value Date    CHOL 175 09/16/2021    HDL 88 09/16/2021    TRIG 68 09/16/2021    BUN 14 09/16/2021     09/16/2021    CO2 27 09/16/2021    Lab Results   Component Value Date    WBC 6.9 02/02/2021    HGB 13.5 02/02/2021    HCT 40.4 02/02/2021    MCV 91 02/02/2021     02/02/2021    No results found for: TROPONINI  No results found for: BNP, NTBNPI, NTBNP  Lab Results   Component Value Date    TSH 0.76 02/02/2021     No results found for: INR     Total Time- 30 minutes spent on date of encounter doing chart review, history and exam, documentation and further activities as noted above.  This note has been dictated using voice recognition software. Any grammatical, typographical, or context distortions are unintentional and inherent to the software.    MICHELL Velez [facility]    Thank you for allowing me to participate in the care of your patient.      Sincerely,     ASHOK Velez CNP [facility]  cc:   ASHOK Velez [address] [address]

## 2022-01-27 NOTE — PATIENT INSTRUCTIONS
Kelly Mckeon,    It was a pleasure to see you today at the [facility].     My recommendations after this visit include:    Please call if you have more than 4 hours of atrial fib or if you have more than 4 episodes to update us    No medication changes needed    My contact information:  Maria Esther Bains CNP  After Hours or Scheduling  [phone]  My Nurses phone number [phone]- normal business hours

## 2022-01-27 NOTE — PROGRESS NOTES
Assessment/Recommendations     Assessment:      1.  Paroxysmal atrial fibrillation-patient underwent work-up for palpitations; wore a 30-day heart monitor that captured paroxysmal atrial fibrillation.  Patient was started on metoprolol, and has been seen in follow-up for uncontrolled hypertension which is now controlled, and returns to the clinic today for follow-up.  She has had no further atrial fibrillation since starting metoprolol    -Pathophysiology and chronic nature of atrial fibrillation discussed with patient and her .  Rate versus rhythm control management approaches were discussed.  [id number] scoring system and elevated risk of thromboembolism/stroke discussed.  Triggers of atrial fibrillation also discussed.  -Continue metoprolol succinate 25 mg oral daily  -Continue Eliquis 5 mg p.o. twice daily  -Continue to keep a log of episodes of arrhythmia     2.  Hypertension-    -continue metoprolol and Hyzaar  -Continue daily exercise and weight reduction efforts  -Continue low salt diet  -Patient has made lifestyle modifications and is doing very well     3.  Probable sleep apnea-according to the patient's  patient has severe snoring.  Educated patient and  that atrial fibrillation is more difficult to manage if patient has untreated sleep apnea.     -Sleep evaluation ordered        [id number] score of 3: 1 gender, 1 age, 1 hypertension and on we will start Eliquis 5 mg p.o. twice daily once her blood pressure is well managed.     Kelly YANG Brianramon will follow up Maria Esther Bains 6 months after seeing Dr. Rivera or sooner if needed       History of Present Illness/Subjective    Ms. Kelly Mckeon is a 71 year old female seen at [facility] today for paroxysmal atrial fibrillation.      Kelly Mckeon has a known history of neuropathy, obesity, hyperlipidemia, hypertension, loud snoring with VIOLET work-up in place.    Met with Kelly rust to discuss atrial  fibrillation-she has had no further recurrence of atrial fibrillation after starting metoprolol, gaining control of her blood pressure, and participating in daily exercise.  When she experiences atrial fibrillation she has increased awareness of heartbeat and palpitations.  She denies fatigue, lightheadedness, shortness of breath, dyspnea on exertion, orthopnea, PND, palpitations, chest pain, abdominal fullness/bloating and lower extremity edema.      Cardiographics (reviewed):  ECHO  Echo result w/o MOPS: Interpretation Summary 1.Left ventricular size, wall motion and function are normal. The ejectionfraction is 60-65%, visualy hyperdynamic.2.TAPSE is normal, which is consistent with normal right ventricular systolicfunction.3.No hemodynamically significant valvular abnormalities on 2D or color flowimaging.4.There is no comparison study available.     9/10/2021 cardiac event monitor  Patient wore monitor 9/10/2021 through 10/9/2021     Impression  Predominantly sinus rhythm.  Paroxysmal atrial fibrillation - single episode lasting 2hrs 26min associated with borderline high average ventricular rate.  Symptom triggers (3) correlated to atrial fibrillation, sinus rhythm with isolated PAC.       Physical Examination Review of Systems   Vitals: There were no vitals taken for this visit.  BMI= There is no height or weight on file to calculate BMI.  Wt Readings from Last 3 Encounters:   10/15/21 100.7 kg (222 lb)   09/16/21 99.8 kg (220 lb)   08/25/21 99.8 kg (220 lb)       General Appearance:   Alert, cooperative and in no acute distress.   ENT/Mouth: membranes moist, no facial drooping   EYES:  no scleral icterus, normal conjunctivae   Neck: no JVD   Chest/Lungs:   lungs are clear to auscultation, no rales or wheezing, respirations unlabored   Cardiovascular:   Regular. Normal first and second heart sounds with no murmurs, rubs, or gallops; the radial and posterior tibial pulses are intact, no edema bilateral lower  extremities    Abdomen:  Soft, nontender, nondistended, bowel sounds present   Extremities: no cyanosis or clubbing   Skin: warm, dry.    Neurologic: mood and affect are appropriate, alert and oriented x3         Please refer above for cardiac ROS details.      Medical History  Surgical History Family History Social History   No past medical history on file.  Past Surgical History:   Procedure Laterality Date     AS REPAIR/GRAFT ACHILLES TENDON Right 2011     benign ovarian tumor removal  1999     wisdom teeth       ZZC MOLE REMOVAL (LESIONS) TIER 1       Family History   Problem Relation Age of Onset     Heart Failure Mother      Emphysema Mother      Melanoma Mother      No Known Problems Father     Social History     Socioeconomic History     Marital status:      Spouse name: Not on file     Number of children: Not on file     Years of education: Not on file     Highest education level: Not on file   Occupational History     Not on file   Tobacco Use     Smoking status: Never Smoker     Smokeless tobacco: Never Used   Substance and Sexual Activity     Alcohol use: Never     Drug use: Never     Sexual activity: Yes     Partners: Male   Other Topics Concern     Not on file   Social History Narrative     Not on file     Social Determinants of Health     Financial Resource Strain: Not on file   Food Insecurity: Not on file   Transportation Needs: Not on file   Physical Activity: Not on file   Stress: Not on file   Social Connections: Not on file   Intimate Partner Violence: Not on file   Housing Stability: Not on file          Medications  Allergies   Current Outpatient Medications   Medication Sig Dispense Refill     apixaban ANTICOAGULANT (ELIQUIS) 5 MG tablet Take 1 tablet (5 mg) by mouth 2 times daily 60 tablet 11     Ascorbic Acid (VITAMIN C PO) Take 500 mg by mouth daily        losartan-hydrochlorothiazide (HYZAAR) 100-25 MG tablet Take 1 tablet by mouth daily 90 tablet 3     metoprolol succinate ER  (TOPROL-XL) 25 MG 24 hr tablet Take 1 tablet (25 mg) by mouth daily 30 tablet 3     rosuvastatin (CRESTOR) 5 MG tablet Take 1 tablet (5 mg) by mouth daily 90 tablet 3     vitamin D3 (CHOLECALCIFEROL) 2000 units (50 mcg) tablet Take 2,000 Units by mouth daily      Allergies   Allergen Reactions     Definity      Lisinopril Cough     Perflutren Lipid Microsphere [Propane]      Severe back pain     Seasonal Allergies          Lab Results    Chemistry/lipid CBC Cardiac Enzymes/BNP/TSH/INR   Lab Results   Component Value Date    CHOL 175 09/16/2021    HDL 88 09/16/2021    TRIG 68 09/16/2021    BUN 14 09/16/2021     09/16/2021    CO2 27 09/16/2021    Lab Results   Component Value Date    WBC 6.9 02/02/2021    HGB 13.5 02/02/2021    HCT 40.4 02/02/2021    MCV 91 02/02/2021     02/02/2021    No results found for: TROPONINI  No results found for: BNP, NTBNPI, NTBNP  Lab Results   Component Value Date    TSH 0.76 02/02/2021     No results found for: INR     Total Time- 30 minutes spent on date of encounter doing chart review, history and exam, documentation and further activities as noted above.  This note has been dictated using voice recognition software. Any grammatical, typographical, or context distortions are unintentional and inherent to the software.    Maria Esther Bains [facility]

## 2022-02-09 ENCOUNTER — OFFICE VISIT (OUTPATIENT)
Dept: SLEEP MEDICINE | Facility: CLINIC | Age: 72
End: 2022-02-09
Payer: COMMERCIAL

## 2022-02-09 DIAGNOSIS — G47.33 OSA (OBSTRUCTIVE SLEEP APNEA): ICD-10-CM

## 2022-02-09 PROCEDURE — G0399 HOME SLEEP TEST/TYPE 3 PORTA: HCPCS | Performed by: OTOLARYNGOLOGY

## 2022-02-10 ENCOUNTER — DOCUMENTATION ONLY (OUTPATIENT)
Dept: SLEEP MEDICINE | Facility: CLINIC | Age: 72
End: 2022-02-10
Payer: COMMERCIAL

## 2022-02-10 NOTE — PROGRESS NOTES
"HST POST-STUDY QUESTIONNAIRE    1. What time did you go to bed?  10\"30  2. How long do you think it took to fall asleep?  1 1/2H   3. What time did you wake up to start the day?  611   4. Did you get up during the night at all?  n  5. If you woke up, do you remember approximately what time(s)? 4:21 Other times didn't look at time   6. Did you have any difficulty with the equipment?  No  7. Did you us any type of treatment with this study?  None  8. Was the head of the bed elevated? No  9. Did you sleep in a recliner?  No  10. Did you stop using CPAP at least 3 days before this test?  NA  11. Any other information you'd like us to know?NA     "

## 2022-02-11 NOTE — PROGRESS NOTES
This HSAT was performed using a Noxturnal T3 device which recorded snore, sound, movement activity, body position, nasal pressure, oronasal thermal airflow, pulse, oximetry and both chest and abdominal respiratory effort. HSAT data was restricted to the time patient states they were in bed.     HSAT was scored using 1B 4% hypopnea rule.     AHI: 6.9. Snoring was reported as loud.  Time with SpO2 below 89% was 1.4 minutes.   Overall signal quality was good     Pt will follow up with sleep provider to determine appropriate therapy.     Ordering Provider, Kurt Bennett PA-C Charles O., BA, [facility], RST  System Clinical Specialist 2/11/2022

## 2022-02-14 ENCOUNTER — HOSPITAL ENCOUNTER (OUTPATIENT)
Dept: MAMMOGRAPHY | Facility: CLINIC | Age: 72
Discharge: HOME | End: 2022-02-14
Attending: INTERNAL MEDICINE | Admitting: INTERNAL MEDICINE
Payer: COMMERCIAL

## 2022-02-14 DIAGNOSIS — Z12.31 VISIT FOR SCREENING MAMMOGRAM: ICD-10-CM

## 2022-02-14 PROCEDURE — 77067 SCR MAMMO BI INCL CAD: CPT

## 2022-03-04 ENCOUNTER — VIRTUAL VISIT (OUTPATIENT)
Dept: SLEEP MEDICINE | Facility: CLINIC | Age: 72
End: 2022-03-04
Payer: COMMERCIAL

## 2022-03-04 DIAGNOSIS — G47.33 OSA (OBSTRUCTIVE SLEEP APNEA): Primary | ICD-10-CM

## 2022-03-04 PROCEDURE — 99213 OFFICE O/P EST LOW 20 MIN: CPT | Mod: 95 | Performed by: PHYSICIAN ASSISTANT

## 2022-03-04 ASSESSMENT — SLEEP AND FATIGUE QUESTIONNAIRES
HOW LIKELY ARE YOU TO NOD OFF OR FALL ASLEEP WHILE SITTING AND TALKING TO SOMEONE: WOULD NEVER DOZE
HOW LIKELY ARE YOU TO NOD OFF OR FALL ASLEEP WHILE SITTING INACTIVE IN A PUBLIC PLACE: WOULD NEVER DOZE
HOW LIKELY ARE YOU TO NOD OFF OR FALL ASLEEP WHILE SITTING QUIETLY AFTER LUNCH WITHOUT ALCOHOL: WOULD NEVER DOZE
HOW LIKELY ARE YOU TO NOD OFF OR FALL ASLEEP WHEN YOU ARE A PASSENGER IN A CAR FOR AN HOUR WITHOUT A BREAK: SLIGHT CHANCE OF DOZING
HOW LIKELY ARE YOU TO NOD OFF OR FALL ASLEEP WHILE SITTING AND READING: SLIGHT CHANCE OF DOZING
HOW LIKELY ARE YOU TO NOD OFF OR FALL ASLEEP WHILE WATCHING TV: SLIGHT CHANCE OF DOZING
HOW LIKELY ARE YOU TO NOD OFF OR FALL ASLEEP WHILE LYING DOWN TO REST IN THE AFTERNOON WHEN CIRCUMSTANCES PERMIT: SLIGHT CHANCE OF DOZING
HOW LIKELY ARE YOU TO NOD OFF OR FALL ASLEEP IN A CAR, WHILE STOPPED FOR A FEW MINUTES IN TRAFFIC: WOULD NEVER DOZE

## 2022-03-04 NOTE — PATIENT INSTRUCTIONS
Analysis Time:  450 minutes     Respiration:   Sleep Associated Hypoxemia: sustained hypoxemia was not present. Baseline oxygen saturation was 94%.  Time with saturation less than or equal to 88% was 0.6 minutes. The lowest oxygen saturation was 86%.   Snoring: Snoring was present.  Respiratory events: The home study revealed a presence of 20 obstructive apneas and 0 mixed and central apneas. There were 33 hypopneas resulting in a combined apnea/hypopnea index [AHI] of 6.9 events per hour.  AHI was 6.5 per hour supine, 0 per hour prone, 23.9 per hour on left side, and 4.3 per hour on right side.   Pattern: Excluding events noted above, respiratory rate and pattern was Normal.     Position: Percent of time spent: supine - 52.6%, prone - 0%, on left - 7.6%, on right - 39.8%.     Heart Rate: By pulse oximetry normal rate was noted.      Assessment:   Mild obstructive sleep apnea.  Sleep associated hypoxemia was not present.     Recommendations:  Consider auto-CPAP at 5-10 cmH2O or oral appliance therapy.(if this mild VIOLET would require therapy based on symptoms and further risk assessment).  Suggest optimizing sleep hygiene and avoiding sleep deprivation.  Weight management.(BMI>30)      [facility]  Search engine: https://mms.aadsm.org/members/directory/search_bootstrap.php?org_id=ADSM&   Certified in Dental Sleep Medicine    Rolando Resendiz  Degree: DDS  [address]  [address]  [place] [address]  Professional Phone: [phone]  Website: [email or web address]    Khang Mobley  Degree: DDS  [facility]  [address]  [address]  [place] [address]  Professional Phone: [phone]: [phone]    Naheed Meier  [facility]  [address]  [place] [address]  Professional Phone: [phone]  Website: [email or web address]      Soham Corona  Degree: DDS  [address]  [address]  [place]  [place] [address]  Professional Phone: [phone]  Fax: [phone]    Sanket Chaudhary  Degree: DDS  [facility]  [address]  [place] [address]  Professional Phone: [phone]  Website: [email or web address]      Arin Giraldo  [facility]  [address]  [address]  [place] [address]  Professional Phone: [phone]  Website: [email or web address]      Hafsa Vegas  Degree: DDS  [facility]  [address]  [address]  [place] [address]  Professional Phone: [phone]     Alexandria Trujillo  Degree: DDS, PhD  [facility]  [address] [place] [address] [address] [place] [address]   [address]   [place] [address]   Appointments: [phone]   Fax: [phone]   [facility]   [address]   [address]   [place] [address]   Appointments: [phone]   Fax: [phone]                Jamal Moreno  Degree: DDS  [address] [place] [address]  Professional Phone: [phone]  Fax: [phone]  Website: [email or web address]      Kraig Mondragon  Degree: DDS  [facility]  [address] [place] [address]    Mariona Mulet Pradera  Degree: MS ABBEY  [facility]  [address] [place] [address]  Professional Phone: [phone]      Miguelina Case  Degree: MS ABBEY  [facility]  [address]  [address]  [place] [address]  Professional Phone: [phone]    Geetha Batista  Degree: ABBEY  [facility]  [address]  [address]  [place] [address]  [facility]     Yahir Renteria  Degree: DDS  [facility]  [address] [place] [address]  Professional Phone: [phone]  Website:  [email or web address]      Billy De Leon  Degree: ABBEY [facility] [place]  [address] [place] [address]  Professional Phone: [phone]  Fax: [phone]      Job Marie  Degree: ABBEY [facility]  [address] [place]  [address]  [place] [address]                 ACCEPT MEDICARE  Francisco Maddox DDS  [address], [place] [address]  [phone]; [phone] (fax)  VMob    Ramana Narvaez DDS, MS   [facility]   [address]   [address]   [place] [address]   Appointments: [phone]   Fax: [phone]       ADDITIONAL PROVIDERS  Deandre Ramos DDS   [facility]   [address] [place] [address]   [address]   [place] [address]   Appointments: [phone]   Fax: [phone]       Coy Tian DDS, [facility]  [address] [place] [address] [address]  [place] [address]   Appointments: [phone] Ext: [phone]  Fax: [phone]   [email or web address]

## 2022-03-04 NOTE — PROGRESS NOTES
"HOME SLEEP STUDY INTERPRETATION    Patient: Kelly Mckeon  MRN: [id number]  YOB: 1950  Study Date: 2022  PCP/Referring Provider: Ayleen Castro;   Ordering Provider: Kurt Bennett PA-C     Indications for Home Study: Kelly Mckeon is a 71 year old female with a history of snoring, apneas, obesity, HTN, A. Fib. who presents with symptoms suggestive of obstructive sleep apnea.    Estimated body mass index is 36.77 kg/m  as calculated from the following:    Height as of 22: 1.676 m (5' 6\").    Weight as of 22: 103.3 kg (227 lb 12.8 oz).  Total score - Delight: 5 (2021  2:00 PM)  STOP-BAN/8    Data: A full night home sleep study was performed recording the standard physiologic parameters including body position, movement, sound, nasal pressure, thermal oral airflow, chest and abdominal movements with respiratory inductance plethysmography, and oxygen saturation by pulse oximetry. Pulse rate was estimated by oximetry recording. This study was considered adequate based on > 4 hours of quality oximetry and respiratory recording. As specified by the AASM Manual for the Scoring of Sleep and Associated events, version 2.3, Rule VIII.D 1B, 4% oxygen desaturation scoring for hypopneas is used as a standard of care on all home sleep apnea testing.    Analysis Time:  450 minutes    Respiration:   Sleep Associated Hypoxemia: sustained hypoxemia was not present. Baseline oxygen saturation was 94%.  Time with saturation less than or equal to 88% was 0.6 minutes. The lowest oxygen saturation was 86%.   Snoring: Snoring was present.  Respiratory events: The home study revealed a presence of 20 obstructive apneas and 0 mixed and central apneas. There were 33 hypopneas resulting in a combined apnea/hypopnea index [AHI] of 6.9 events per hour.  AHI was 6.5 per hour supine, 0 per hour prone, 23.9 per hour on left side, and 4.3 per hour on right side.   Pattern: Excluding events noted " above, respiratory rate and pattern was Normal.    Position: Percent of time spent: supine - 52.6%, prone - 0%, on left - 7.6%, on right - 39.8%.    Heart Rate: By pulse oximetry normal rate was noted.     Assessment:   Mild obstructive sleep apnea.  Sleep associated hypoxemia was not present.    Recommendations:  Consider auto-CPAP at 5-10 cmH2O or oral appliance therapy.(if this mild VIOLET would require therapy based on symptoms and further risk assessment).  Suggest optimizing sleep hygiene and avoiding sleep deprivation.  Weight management.(BMI>30)    Diagnosis Code(s): Obstructive Sleep Apnea G47.33    Magnus Crawford MD,  March 4, 2022   Diplomate, American Board of Otolaryngology, Sleep Medicine

## 2022-03-04 NOTE — PROGRESS NOTES
"Does Kelly have a CPAP/Bipap?  No     [place] Sleep Scale: 4    Kelly is a 71 year old who is being evaluated via a billable video visit.      How would you like to obtain your AVS? MyChart  If the video visit is dropped, the invitation should be resent by: Text to cell phone: [phone]  Will anyone else be joining your video visit? No      Video Start Time: 1:33        Subjective   Kelly is a 71 year old who presents for the following health issues study results         Vitals:  No vitals were obtained today due to virtual visit.      Video-Visit Details    Type of service:  Video Visit    Video End Time:1:47 PM    Originating Location (pt. Location): Home    Distant Location (provider location):  [facility]     Platform used for Video Visit: [facility]       Sleep Study Follow-Up Visit:    Date on this visit: 3/4/2022    Kelly Mckeon comes in today for follow-up of her home sleep study done on 2/9/22 at the [facility] for possible sleep apnea.     AHI was 6.9, without desaturations.  Supine RDI 6.5, consistent with mild SUPINE VIOLET.    These findings were reviewed with patient.     Past medical/surgical history, family history, social history, medications and allergies were reviewed.      Problem List:  Patient Active Problem List    Diagnosis Date Noted     Paroxysmal atrial fibrillation (H) 01/27/2022     Priority: Medium     Obesity (BMI 35.0-39.9) with comorbidity (H) 09/10/2019     Priority: Medium     Peripheral sensory neuropathy 06/28/2017     Priority: Medium     Isabel's cyst 01/25/2015     Priority: Medium     Screening for osteoporosis 02/03/2013     Priority: Medium     \"The DEXA costs $900, my share. I choose not to at this time.\"   8-12       Hyperlipidemia LDL goal <130 02/24/2011     Priority: Medium     Patient strongly wants to be off of statin therapy. Her LDL prior to starting was quite high at 188. She will try Mediterranean diet with 6 " capsules per day of RRY extract, then recheck lipid panel.  8-12    Intolerant to pravastatin       Vitamin D deficiency 02/24/2011     Priority: Medium     On supplement but not sure if she's taking 1000 or 2000 units per day. Patient will call with dose.   2-11       Essential hypertension 01/12/2010     Priority: Medium     Personal history of colonic polyps 09/06/2006     Priority: Medium     Due for colonoscopy in 2020          Impression/Plan:    Mild sleep apnea- discussed weight loss, cpap, or oral appliance. She will follow up with her a fib clinic. If she starts treatment she would prefer the oral appliance, information provided.   She will follow up with me as needed.    Fifteen minutes spent with patient, all of which were spent face-to-face counseling, consulting, coordinating plan of care.          CC: Ayleen Castro

## 2022-05-23 ENCOUNTER — TELEPHONE (OUTPATIENT)
Dept: CARDIOLOGY | Facility: CLINIC | Age: 72
End: 2022-05-23
Payer: COMMERCIAL

## 2022-05-23 DIAGNOSIS — I48.0 PAROXYSMAL ATRIAL FIBRILLATION (H): Primary | ICD-10-CM

## 2022-05-23 NOTE — TELEPHONE ENCOUNTER
Called pt to discuss below. Pt state she went into SR this morning. States she had a little dizziness with AFIB episode but other than that denies any other symptoms.  This was the longest episode she has had.  She states the last episode was back in October 2021, and this was the first episode since being on metoprolol medication.    /77 HR 77bpm at 8am today and she was in SR.    Med changes that were made over the weekend by on [facility] cardiologist:  Metoprolol 25mg daily prior to the weekend, on Saturday she stated Dr. Mcgovern told her to take 2 tablets in the morning, then she called again Sunday and Dr. Mckinney directed her to take 1 tablet in the am and 1 tablet with dinner.    Pt states she used a new med called Voltarin, topical NSAID, Friday morning and then AFIB episode started Friday evening, denies dehydration or any other new medications that could have triggered AFIB episode.    Pt had a few questions regarding next steps.    1) She said Dr. Mckinney told her that she may need medication change and that she should come into clinic for a discussion on what medication would be recommended.  Maria Esther- ok to schedule office visit with you regarding this episode and next steps?  I told her that med change may not be needed unless she has more frequent episodes of AFIB.    2) pt wanted to know if you think the topical NSAID triggered the AFIB episode.    Thanks!  Guadalupe

## 2022-05-23 NOTE — TELEPHONE ENCOUNTER
----- Message from Claudio Cullen RN sent at 5/23/2022  9:07 AM CDT -----    ----- Message -----  From: Onofre Mckinney MD  Sent: 5/22/2022   8:30 AM CDT  To: Kathe Gore RN, Jenelleharsha Delarosa, #    Patient called with persistent atrial fibrillation since yesterday.  She reportedly spoke with Dr. Mcgovern yesterday who had her increase the metoprolol.  She still is noticing an irregular heart rhythm but no other symptoms.  She was can take metoprolol XL 25 mg twice daily and thus her heart rate is less than 60 or blood pressure less than 100 today.  She will come to the ER if she develops any worsening or increased symptoms.  I did indicate to her that we would contact her in the morning to make additional plans tomorrow.  She is chronically on Eliquis.  Thank you for your help.  Number

## 2022-05-24 RX ORDER — METOPROLOL SUCCINATE 25 MG/1
25 TABLET, EXTENDED RELEASE ORAL 2 TIMES DAILY
Qty: 90 TABLET | Refills: 1 | Status: SHIPPED | OUTPATIENT
Start: 2022-05-24 | End: 2022-09-26

## 2022-05-24 NOTE — TELEPHONE ENCOUNTER
Spoke to pt regarding recommendations below. She states understanding and will call if she has further episodes on increase in medication.  Will update medication to reflect change and send message to scheduling to contact pt to schedule July follow up.    Guadalupe

## 2022-05-24 NOTE — TELEPHONE ENCOUNTER
Message  Received: Today  Maria Esther Banis APRN CNP Holen, Megan, RN  Caller: Unspecified (Yesterday,  9:34 AM)  Nguyễn Butcher,     Caprice to continue metoprolol succinate 25 mg p.o. twice daily.  Come in to be seen with next breakthrough episode, also due to be seen in July by A. fib clinic     Thank you Maria Esther Bains CNP

## 2022-06-02 ENCOUNTER — MYC MEDICAL ADVICE (OUTPATIENT)
Dept: FAMILY MEDICINE | Facility: CLINIC | Age: 72
End: 2022-06-02
Payer: COMMERCIAL

## 2022-06-02 NOTE — TELEPHONE ENCOUNTER
Panel Management:    Patient's blood pressure was elevated at a recent clinic visit.  It is important to get blood pressure < 140/90    My chart message sent

## 2022-06-03 ENCOUNTER — TELEPHONE (OUTPATIENT)
Dept: FAMILY MEDICINE | Facility: CLINIC | Age: 72
End: 2022-06-03

## 2022-06-03 ENCOUNTER — ALLIED HEALTH/NURSE VISIT (OUTPATIENT)
Dept: FAMILY MEDICINE | Facility: CLINIC | Age: 72
End: 2022-06-03
Payer: COMMERCIAL

## 2022-06-03 VITALS — SYSTOLIC BLOOD PRESSURE: 126 MMHG | DIASTOLIC BLOOD PRESSURE: 80 MMHG | HEART RATE: 68 BPM

## 2022-06-03 DIAGNOSIS — I48.0 PAROXYSMAL ATRIAL FIBRILLATION (H): Primary | ICD-10-CM

## 2022-06-03 PROCEDURE — 99207 PR NO CHARGE NURSE ONLY: CPT

## 2022-06-03 NOTE — TELEPHONE ENCOUNTER
Kelly Mckeon is a 71 year old year old patient who comes in today for a Blood Pressure check because of medication change.Pt checking her BP and pulse rate twice daily before medications after metoprolol increased secondary to afib.   Patient is taking medication as prescribed  Patient is tolerating medications well.  Patient is monitoring Blood Pressure at home.  Average readings if yes are 120/80 P mid 60s  Current complaints: none  Disposition:  patient instructed to monitor, no change in medications. Notify care team if BP >140/90 , SBP <100 , pulse <60 or >100.  Sharon MOLINA RN

## 2022-06-06 NOTE — TELEPHONE ENCOUNTER
Pt was called and given Dr Castro's message, pt does not need refills of medication at this time.   Alexa Venegas RN

## 2022-06-09 ENCOUNTER — TELEPHONE (OUTPATIENT)
Dept: CARDIOLOGY | Facility: CLINIC | Age: 72
End: 2022-06-09
Payer: COMMERCIAL

## 2022-06-09 NOTE — TELEPHONE ENCOUNTER
[facility] Call Center    Phone Message    May a detailed message be left on voicemail: yes     Reason for Call: Medication Question or concern regarding medication   Prescription Clarification  Name of Medication: metoprolol succinate ER (TOPROL XL) 25 MG 24 hr tablet  Prescribing Provider: Nara   Pharmacy:    What on the order needs clarification? Patient stated this mornings she has had a HR of under 60 bpm and was wondering if she is supposed to skip her dose of metoprolol or still take it. Please call and discuss.          Action Taken: Other: Cardiology    Travel Screening: Not Applicable                                                                      
Maria Esther Bains APRN CNP Holen, Megan, RN  Caller: Unspecified (Today,  9:24 AM)  Nguyễn Butcher, it seems she is tolerating the metoprolol well.     I do not expect her to check her heart rate every day.     She does not need to continue to follow the hold parameters and can continue taking it twice per day     Thank you Maria Esther Bains, MICHELL     
Noted. Spoke to pt regarding recommendations.  No further questions or concerns at this time.    Guadalupe  
Spoke to pt regarding questions below.    Pt states she made the change from metoprolol 25mg daily to metoprolol succinate 25 BID back on May 23th and has been doing well since.  She stated when she talked to Dr. Mckinney on that Sunday he told her to check her HR and hold metoprolol dose if HR is less edith 60bpm or SBP less than 100.  She stated this morning was the only time her HR was under 60, her HR was 55bpm and 57bpm.   She would like to clarify if she should still use the parameters set by Dr. Mckinney or if she should continue taking since this is the first time.      Discussed with pt that I will forward to Maria Esther to review and call her back with recommendations.    Maria Esther please review and let us know your recommendations.    Thanks!    Guadalupe  
Verbal/written post procedure instructions were given to patient/caregiver

## 2022-07-14 ENCOUNTER — OFFICE VISIT (OUTPATIENT)
Dept: CARDIOLOGY | Facility: CLINIC | Age: 72
End: 2022-07-14
Attending: NURSE PRACTITIONER
Payer: COMMERCIAL

## 2022-07-14 VITALS
DIASTOLIC BLOOD PRESSURE: 80 MMHG | SYSTOLIC BLOOD PRESSURE: 132 MMHG | RESPIRATION RATE: 12 BRPM | BODY MASS INDEX: 36.32 KG/M2 | HEART RATE: 61 BPM | WEIGHT: 225 LBS

## 2022-07-14 DIAGNOSIS — I10 ESSENTIAL HYPERTENSION: ICD-10-CM

## 2022-07-14 DIAGNOSIS — I48.0 PAROXYSMAL ATRIAL FIBRILLATION (H): Primary | ICD-10-CM

## 2022-07-14 PROCEDURE — 99214 OFFICE O/P EST MOD 30 MIN: CPT | Performed by: NURSE PRACTITIONER

## 2022-07-14 NOTE — PATIENT INSTRUCTIONS
Kelly Mckeon,    It was a pleasure to see you today at the [facility].     My recommendations after this visit include:     Call if more frequent or long episodes of atrial fibrillation.    To followup with  Dr. Rivera in 6 months      My contact information:  Wolf Chinchilla, MICHELL  After Hours or Scheduling  [phone]  My Nurse---Jenelle Delarosa [phone]

## 2022-07-14 NOTE — PROGRESS NOTES
Thank you, Dr. Castro, for asking the [facility] team to see Ms. Kelly Mckeon to evaluate paroxysmal atrial fibrillation.    Assessment/Recommendations     Assessment/Plan:    Diagnoses and all orders for this visit:  Paroxysmal atrial fibrillation (H) and occ. short palpitations but recent 2 day episode.  Discussed antiarrhythmic vs. Ablation option for treatment.  Not ready to either right now but leaning towards pill in the pocket.   on flecainide.  Discussed need stress test if goes on flecainide.  Would recommend Flecainide 100 mg at onset of afib and 50 mg if still in afib in 4 hours.    Essential hypertension and BP at goal and with frequent home monitoring as well.  Snoring and discussed sleep study today.  Study  showed AHI of 6.9 and loud snoring.  She decided not to do CPAP.  I recommend that she try to lose some weight.  She sleeps in electronic bed with head of bed elevation.    [id number] score of 3 and on Eliquis.  Follow up in clinic with Dr. Rivera in 6 months.     History of Present Illness/Subjective     Kelly Mckeon is a very pleasant 72 year old female who comes in today for EP follow-up for paroxysmal atrial fibrillation.  Kelly Mckeon has a known history of PAF, neuropathy, obesity, hyperlipidemia, hypertension, loud snoring with mild VIOLET on sleep study.  Her symptoms with atrial fibrillation are awareness of heartbeat and palpitations.  Patient and  today report that she is more fatigued, irritable, short of breath and feels like things are not right when she is in atrial fibrillation.  She has a list of episodes which are about every other month but the majority of these are very short i.e. minutes long.  She had a recent episode that was about 2 days long.  She called in and took extra metoprolol which helped.  Outside of atrial fibrillation episode she denies any cardiac symptoms.      Cardiographics (reviewed):  September 2021 ECHO  showed:    Echo result w/o MOPS: Interpretation Summary 1.Left ventricular size, wall motion and function are normal. The ejectionfraction is 60-65%, visualy hyperdynamic.2.TAPSE is normal, which is consistent with normal right ventricular systolicfunction.3.No hemodynamically significant valvular abnormalities on 2D or color flowimaging.4.There is no comparison study available.     9/10/2021 cardiac event monitor showed:  Patient wore monitor 9/10/2021 through 10/9/2021     Impression  Predominantly sinus rhythm.  Paroxysmal atrial fibrillation - single episode lasting 2hrs 26min associated with borderline high average ventricular rate.  Symptom triggers (3) correlated to atrial fibrillation, sinus rhythm with isolated PAC.         Problem List:  Patient Active Problem List   Diagnosis     Baker's cyst     Hyperlipidemia LDL goal <130     Essential hypertension     Peripheral sensory neuropathy     Personal history of colonic polyps     Screening for osteoporosis     Vitamin D deficiency     Obesity (BMI 35.0-39.9) with comorbidity (H)     Paroxysmal atrial fibrillation (H)     Revi  e  Physical Examination Review of Systems   w fystems  There were no vitals taken for this visit.  There is no height or weight on file to calculate BMI.  Wt Readings from Last 3 Encounters:   01/27/22 103.3 kg (227 lb 12.8 oz)   10/15/21 100.7 kg (222 lb)   09/16/21 99.8 kg (220 lb)     General Appearance:   Alert, well-appearing and in no acute distress.   HEENT: Atraumatic, normocephalic.  No scleral icterus, normal conjunctivae; mucous membranes pink and moist.     Chest: Chest symmetric, spine straight.   Lungs:   Respirations unlabored.   Cardiovascular:   Normal JVD, no edema.       Extremities: No cyanosis or clubbing   Musculoskeletal: Moves all extremities   Skin: Warm, dry, intact.    Neurologic: Mood and affect are appropriate, alert and oriented to person, place, time, and situation     ROS: 10 point ROS neg other than the  symptoms noted above in the HPI.     Medical History  Surgical History Family History Social History     No past medical history on file. Past Surgical History:   Procedure Laterality Date     AS REPAIR/GRAFT ACHILLES TENDON Right 2011     benign ovarian tumor removal  1999     wisdom teeth       ZZC MOLE REMOVAL (LESIONS) TIER 1      Family History   Problem Relation Age of Onset     Heart Failure Mother      Emphysema Mother      Melanoma Mother      No Known Problems Father     History   Smoking Status     Never Smoker   Smokeless Tobacco     Never Used     Social History    Substance and Sexual Activity      Alcohol use: Never       Medications  Allergies     Current Outpatient Medications   Medication Sig Dispense Refill     apixaban ANTICOAGULANT (ELIQUIS) 5 MG tablet Take 1 tablet (5 mg) by mouth 2 times daily 60 tablet 11     Ascorbic Acid (VITAMIN C PO) Take 500 mg by mouth daily        losartan-hydrochlorothiazide (HYZAAR) 100-25 MG tablet Take 1 tablet by mouth daily 90 tablet 3     metoprolol succinate ER (TOPROL XL) 25 MG 24 hr tablet Take 1 tablet (25 mg) by mouth 2 times daily 90 tablet 1     rosuvastatin (CRESTOR) 5 MG tablet Take 1 tablet (5 mg) by mouth daily 90 tablet 3     vitamin D3 (CHOLECALCIFEROL) 2000 units (50 mcg) tablet Take 2,000 Units by mouth daily        Allergies   Allergen Reactions     Definity      Lisinopril Cough     Perflutren Lipid Microsphere [Propane]      Severe back pain     Seasonal Allergies       Medical, surgical, family, social history, and medications were all reviewed and updated as necessary.   Lab Results    Chemistry/lipid CBC Cardiac Enzymes/BNP/TSH/INR   Recent Labs   Lab Test 09/16/21  1107   CHOL 175   HDL 88   LDL 73   TRIG 68     Recent Labs   Lab Test 09/16/21  1107 09/06/20  0901 09/10/19  0851   LDL 73 85 158*     Recent Labs   Lab Test 09/16/21  1107      POTASSIUM 3.6   CHLORIDE 110*   CO2 27   GLC 87   BUN 14   CR 0.81   GFRESTIMATED 74   CONSTANTIN  8.1*     Recent Labs   Lab Test 09/16/21  1107 02/02/21  1628 09/06/20  0901   CR 0.81 0.90 0.84     No results for input(s): A1C in the last 35429 hours.       Recent Labs   Lab Test 02/02/21  1628   WBC 6.9   HGB 13.5   HCT 40.4   MCV 91        Recent Labs   Lab Test 02/02/21  1628   HGB 13.5    No results for input(s): TROPONINI in the last 41475 hours.  No results for input(s): BNP, NTBNPI, NTBNP in the last 05417 hours.  Recent Labs   Lab Test 02/02/21  1628   TSH 0.76     No results for input(s): INR in the last 29933 hours.       Total Time-30 minutes spent on date of encounter doing chart review, history and exam, documentation and further activities as noted above.  This note has been dictated using voice recognition software. Any grammatical, typographical, or context distortions are unintentional and inherent to the software.

## 2022-07-14 NOTE — LETTER
7/14/2022    Ayleen Castro, DO  [address] [facility] [address]    RE: Kelly Mckeon       Dear Colleague,     I had the pleasure of seeing Kelly Mckeon in the [facility].    Thank you, Dr. Castor, for asking the [facility] team to see Ms. Kelly Mckeon to evaluate paroxysmal atrial fibrillation.    Assessment/Recommendations     Assessment/Plan:    Diagnoses and all orders for this visit:  Paroxysmal atrial fibrillation (H) and occ. short palpitations but recent 2 day episode.  Discussed antiarrhythmic vs. Ablation option for treatment.  Not ready to either right now but leaning towards pill in the pocket.   on flecainide.  Discussed need stress test if goes on flecainide.  Would recommend Flecainide 100 mg at onset of afib and 50 mg if still in afib in 4 hours.    Essential hypertension and BP at goal and with frequent home monitoring as well.  Snoring and discussed sleep study today.  Study  showed AHI of 6.9 and loud snoring.  She decided not to do CPAP.  I recommend that she try to lose some weight.  She sleeps in electronic bed with head of bed elevation.    [id number] score of 3 and on Eliquis.  Follow up in clinic with Dr. Rivera in 6 months.     History of Present Illness/Subjective     Kelly Mckeon is a very pleasant 72 year old female who comes in today for EP follow-up for paroxysmal atrial fibrillation.  Kelly Mckeon has a known history of PAF, neuropathy, obesity, hyperlipidemia, hypertension, loud snoring with mild VIOLET on sleep study.  Her symptoms with atrial fibrillation are awareness of heartbeat and palpitations.  Patient and  today report that she is more fatigued, irritable, short of breath and feels like things are not right when she is in atrial fibrillation.  She has a list of episodes which are about every other month but the majority of these are very short i.e. minutes long.  She had a recent  episode that was about 2 days long.  She called in and took extra metoprolol which helped.  Outside of atrial fibrillation episode she denies any cardiac symptoms.      Cardiographics (reviewed):  September 2021 ECHO showed:    Echo result w/o MOPS: Interpretation Summary 1.Left ventricular size, wall motion and function are normal. The ejectionfraction is 60-65%, visualy hyperdynamic.2.TAPSE is normal, which is consistent with normal right ventricular systolicfunction.3.No hemodynamically significant valvular abnormalities on 2D or color flowimaging.4.There is no comparison study available.     9/10/2021 cardiac event monitor showed:  Patient wore monitor 9/10/2021 through 10/9/2021     Impression  Predominantly sinus rhythm.  Paroxysmal atrial fibrillation - single episode lasting 2hrs 26min associated with borderline high average ventricular rate.  Symptom triggers (3) correlated to atrial fibrillation, sinus rhythm with isolated PAC.         Problem List:  Patient Active Problem List   Diagnosis     Baker's cyst     Hyperlipidemia LDL goal <130     Essential hypertension     Peripheral sensory neuropathy     Personal history of colonic polyps     Screening for osteoporosis     Vitamin D deficiency     Obesity (BMI 35.0-39.9) with comorbidity (H)     Paroxysmal atrial fibrillation (H)     Revi  e  Physical Examination Review of Systems   w [facility]  There were no vitals taken for this visit.  There is no height or weight on file to calculate BMI.  Wt Readings from Last 3 Encounters:   01/27/22 103.3 kg (227 lb 12.8 oz)   10/15/21 100.7 kg (222 lb)   09/16/21 99.8 kg (220 lb)     General Appearance:   Alert, well-appearing and in no acute distress.   HEENT: Atraumatic, normocephalic.  No scleral icterus, normal conjunctivae; mucous membranes pink and moist.     Chest: Chest symmetric, spine straight.   Lungs:   Respirations unlabored.   Cardiovascular:   Normal JVD, no edema.       Extremities: No cyanosis or  clubbing   Musculoskeletal: Moves all extremities   Skin: Warm, dry, intact.    Neurologic: Mood and affect are appropriate, alert and oriented to person, place, time, and situation     ROS: 10 point ROS neg other than the symptoms noted above in the HPI.     Medical History  Surgical History Family History Social History     No past medical history on file. Past Surgical History:   Procedure Laterality Date     AS REPAIR/GRAFT ACHILLES TENDON Right 2011     benign ovarian tumor removal  1999     wisdom teeth       ZZC MOLE REMOVAL (LESIONS) TIER 1      Family History   Problem Relation Age of Onset     Heart Failure Mother      Emphysema Mother      Melanoma Mother      No Known Problems Father     History   Smoking Status     Never Smoker   Smokeless Tobacco     Never Used     Social History    Substance and Sexual Activity      Alcohol use: Never       Medications  Allergies     Current Outpatient Medications   Medication Sig Dispense Refill     apixaban ANTICOAGULANT (ELIQUIS) 5 MG tablet Take 1 tablet (5 mg) by mouth 2 times daily 60 tablet 11     Ascorbic Acid (VITAMIN C PO) Take 500 mg by mouth daily        losartan-hydrochlorothiazide (HYZAAR) 100-25 MG tablet Take 1 tablet by mouth daily 90 tablet 3     metoprolol succinate ER (TOPROL XL) 25 MG 24 hr tablet Take 1 tablet (25 mg) by mouth 2 times daily 90 tablet 1     rosuvastatin (CRESTOR) 5 MG tablet Take 1 tablet (5 mg) by mouth daily 90 tablet 3     vitamin D3 (CHOLECALCIFEROL) 2000 units (50 mcg) tablet Take 2,000 Units by mouth daily        Allergies   Allergen Reactions     Definity      Lisinopril Cough     Perflutren Lipid Microsphere [Propane]      Severe back pain     Seasonal Allergies       Medical, surgical, family, social history, and medications were all reviewed and updated as necessary.   Lab Results    Chemistry/lipid CBC Cardiac Enzymes/BNP/TSH/INR   Recent Labs   Lab Test 09/16/21  1107   CHOL 175   HDL 88   LDL 73   TRIG 68     Recent  Labs   Lab Test 09/16/21  1107 09/06/20  0901 09/10/19  0851   LDL 73 85 158*     Recent Labs   Lab Test 09/16/21  1107      POTASSIUM 3.6   CHLORIDE 110*   CO2 27   GLC 87   BUN 14   CR 0.81   GFRESTIMATED 74   CONSTANTIN 8.1*     Recent Labs   Lab Test 09/16/21  1107 02/02/21  1628 09/06/20  0901   CR 0.81 0.90 0.84     No results for input(s): A1C in the last 37689 hours.       Recent Labs   Lab Test 02/02/21  1628   WBC 6.9   HGB 13.5   HCT 40.4   MCV 91        Recent Labs   Lab Test 02/02/21  1628   HGB 13.5    No results for input(s): TROPONINI in the last 99966 hours.  No results for input(s): BNP, NTBNPI, NTBNP in the last 59556 hours.  Recent Labs   Lab Test 02/02/21  1628   TSH 0.76     No results for input(s): INR in the last 64594 hours.       Total Time-30 minutes spent on date of encounter doing chart review, history and exam, documentation and further activities as noted above.  This note has been dictated using voice recognition software. Any grammatical, typographical, or context distortions are unintentional and inherent to the software.        Thank you for allowing me to participate in the care of your patient.      Sincerely,     ASHOK Hernandez CNP     [facility]  cc:   ASHOK Velez CNP  [address] [address]

## 2022-07-21 DIAGNOSIS — E78.5 HYPERLIPIDEMIA LDL GOAL <130: ICD-10-CM

## 2022-07-21 RX ORDER — ROSUVASTATIN CALCIUM 5 MG/1
5 TABLET, COATED ORAL DAILY
Qty: 90 TABLET | Refills: 0 | Status: SHIPPED | OUTPATIENT
Start: 2022-07-21 | End: 2022-09-20

## 2022-07-21 NOTE — TELEPHONE ENCOUNTER
Reason for Call:  Other prescription    Detailed comments: Pt will be needing a refill of the following medication, Rosuvastatin  Until her appt on Sept 20th  Phar is [facility] MURILLO    Phone Number Patient can be reached at: Home number on file [phone] (home)    Best Time: any    Can we leave a detailed message on this number? YES    Call taken on 7/21/2022 at 10:17 AM by Alexa Lewis

## 2022-09-06 ENCOUNTER — MYC MEDICAL ADVICE (OUTPATIENT)
Dept: FAMILY MEDICINE | Facility: CLINIC | Age: 72
End: 2022-09-06

## 2022-09-06 DIAGNOSIS — I10 ESSENTIAL HYPERTENSION: ICD-10-CM

## 2022-09-06 DIAGNOSIS — E78.5 HYPERLIPIDEMIA LDL GOAL <130: Primary | ICD-10-CM

## 2022-09-06 DIAGNOSIS — I48.0 PAROXYSMAL ATRIAL FIBRILLATION (H): ICD-10-CM

## 2022-09-07 NOTE — TELEPHONE ENCOUNTER
Dr. Castro,    Patient requesting labs prior to office visit.  Pended for your consideration. Alexa VILLALBA RN

## 2022-09-15 ENCOUNTER — LAB (OUTPATIENT)
Dept: LAB | Facility: CLINIC | Age: 72
End: 2022-09-15
Payer: COMMERCIAL

## 2022-09-15 DIAGNOSIS — I48.0 PAROXYSMAL ATRIAL FIBRILLATION (H): ICD-10-CM

## 2022-09-15 DIAGNOSIS — I10 ESSENTIAL HYPERTENSION: ICD-10-CM

## 2022-09-15 DIAGNOSIS — E78.5 HYPERLIPIDEMIA LDL GOAL <130: ICD-10-CM

## 2022-09-15 LAB
ANION GAP SERPL CALCULATED.3IONS-SCNC: 9 MMOL/L (ref 7–15)
BUN SERPL-MCNC: 14.7 MG/DL (ref 8–23)
CALCIUM SERPL-MCNC: 8.7 MG/DL (ref 8.8–10.2)
CHLORIDE SERPL-SCNC: 103 MMOL/L (ref 98–107)
CHOLEST SERPL-MCNC: 173 MG/DL
CREAT SERPL-MCNC: 0.98 MG/DL (ref 0.51–0.95)
DEPRECATED HCO3 PLAS-SCNC: 27 MMOL/L (ref 22–29)
ERYTHROCYTE [DISTWIDTH] IN BLOOD BY AUTOMATED COUNT: 12.1 % (ref 10–15)
GFR SERPL CREATININE-BSD FRML MDRD: 61 ML/MIN/1.73M2
GLUCOSE SERPL-MCNC: 92 MG/DL (ref 70–99)
HCT VFR BLD AUTO: 39.3 % (ref 35–47)
HDLC SERPL-MCNC: 76 MG/DL
HGB BLD-MCNC: 13.2 G/DL (ref 11.7–15.7)
LDLC SERPL CALC-MCNC: 85 MG/DL
MCH RBC QN AUTO: 30.3 PG (ref 26.5–33)
MCHC RBC AUTO-ENTMCNC: 33.6 G/DL (ref 31.5–36.5)
MCV RBC AUTO: 90 FL (ref 78–100)
NONHDLC SERPL-MCNC: 97 MG/DL
PLATELET # BLD AUTO: 215 10E3/UL (ref 150–450)
POTASSIUM SERPL-SCNC: 3.7 MMOL/L (ref 3.4–5.3)
RBC # BLD AUTO: 4.36 10E6/UL (ref 3.8–5.2)
SODIUM SERPL-SCNC: 139 MMOL/L (ref 136–145)
TRIGL SERPL-MCNC: 60 MG/DL
WBC # BLD AUTO: 6.4 10E3/UL (ref 4–11)

## 2022-09-15 PROCEDURE — 85027 COMPLETE CBC AUTOMATED: CPT

## 2022-09-15 PROCEDURE — 36415 COLL VENOUS BLD VENIPUNCTURE: CPT

## 2022-09-15 PROCEDURE — 80061 LIPID PANEL: CPT

## 2022-09-15 PROCEDURE — 80048 BASIC METABOLIC PNL TOTAL CA: CPT

## 2022-09-15 NOTE — RESULT ENCOUNTER NOTE
Cholesterol similar to last check 1 year ago.  Kidney function, electrolytes, blood sugar, blood counts are normal.

## 2022-09-20 ENCOUNTER — OFFICE VISIT (OUTPATIENT)
Dept: FAMILY MEDICINE | Facility: CLINIC | Age: 72
End: 2022-09-20
Payer: COMMERCIAL

## 2022-09-20 VITALS
RESPIRATION RATE: 12 BRPM | HEART RATE: 62 BPM | WEIGHT: 218 LBS | BODY MASS INDEX: 35.03 KG/M2 | DIASTOLIC BLOOD PRESSURE: 86 MMHG | OXYGEN SATURATION: 97 % | TEMPERATURE: 98.1 F | HEIGHT: 66 IN | SYSTOLIC BLOOD PRESSURE: 128 MMHG

## 2022-09-20 DIAGNOSIS — I48.0 PAROXYSMAL ATRIAL FIBRILLATION (H): ICD-10-CM

## 2022-09-20 DIAGNOSIS — Z12.11 SCREEN FOR COLON CANCER: ICD-10-CM

## 2022-09-20 DIAGNOSIS — Z23 NEED FOR PROPHYLACTIC VACCINATION AND INOCULATION AGAINST INFLUENZA: ICD-10-CM

## 2022-09-20 DIAGNOSIS — Z86.0100 PERSONAL HISTORY OF COLONIC POLYPS: ICD-10-CM

## 2022-09-20 DIAGNOSIS — E66.01 MORBID OBESITY (H): ICD-10-CM

## 2022-09-20 DIAGNOSIS — I10 ESSENTIAL HYPERTENSION: ICD-10-CM

## 2022-09-20 DIAGNOSIS — E78.5 HYPERLIPIDEMIA LDL GOAL <130: ICD-10-CM

## 2022-09-20 DIAGNOSIS — Z00.00 ENCOUNTER FOR MEDICARE ANNUAL WELLNESS EXAM: Primary | ICD-10-CM

## 2022-09-20 PROCEDURE — G0008 ADMIN INFLUENZA VIRUS VAC: HCPCS | Performed by: INTERNAL MEDICINE

## 2022-09-20 PROCEDURE — 90662 IIV NO PRSV INCREASED AG IM: CPT | Performed by: INTERNAL MEDICINE

## 2022-09-20 PROCEDURE — 99214 OFFICE O/P EST MOD 30 MIN: CPT | Mod: 25 | Performed by: INTERNAL MEDICINE

## 2022-09-20 PROCEDURE — G0439 PPPS, SUBSEQ VISIT: HCPCS | Performed by: INTERNAL MEDICINE

## 2022-09-20 RX ORDER — ROSUVASTATIN CALCIUM 5 MG/1
5 TABLET, COATED ORAL DAILY
Qty: 90 TABLET | Refills: 3 | Status: SHIPPED | OUTPATIENT
Start: 2022-09-20 | End: 2023-09-26

## 2022-09-20 RX ORDER — LOSARTAN POTASSIUM AND HYDROCHLOROTHIAZIDE 25; 100 MG/1; MG/1
1 TABLET ORAL DAILY
Qty: 90 TABLET | Refills: 3 | Status: SHIPPED | OUTPATIENT
Start: 2022-09-20 | End: 2023-09-26

## 2022-09-20 ASSESSMENT — ENCOUNTER SYMPTOMS
JOINT SWELLING: 1
CONSTIPATION: 0
ABDOMINAL PAIN: 0
HEARTBURN: 0
WEAKNESS: 0
HEMATOCHEZIA: 0
ARTHRALGIAS: 1
NERVOUS/ANXIOUS: 0
BREAST MASS: 0
HEMATURIA: 0
EYE PAIN: 0
DIARRHEA: 0
SORE THROAT: 0
FEVER: 0
NAUSEA: 0
SHORTNESS OF BREATH: 0
PALPITATIONS: 0
PARESTHESIAS: 1
MYALGIAS: 0
FREQUENCY: 0
HEADACHES: 0
DIZZINESS: 0
CHILLS: 0
DYSURIA: 0
COUGH: 0

## 2022-09-20 ASSESSMENT — ACTIVITIES OF DAILY LIVING (ADL): CURRENT_FUNCTION: NO ASSISTANCE NEEDED

## 2022-09-20 ASSESSMENT — PAIN SCALES - GENERAL: PAINLEVEL: NO PAIN (0)

## 2022-09-20 NOTE — PROGRESS NOTES
SUBJECTIVE:   Kelly is a 72 year old who presents for Preventive Visit.      Patient has been advised of split billing requirements and indicates understanding: Yes  Are you in the first 12 months of your Medicare coverage?  No    HPI     Chief Complaint   Patient presents with     medicare wellness      Hypertension     Lipids     Health Maintenance     Will do flu today      Imm/Inj     Flu Shot       Do you feel safe in your environment? Yes    Have you ever done Advance Care Planning? (For example, a Health Directive, POLST, or a discussion with a medical provider or your loved ones about your wishes): Yes, patient states has an Advance Care Planning document and will bring a copy to the clinic.       Fall risk  Fallen 2 or more times in the past year?: No  Any fall with injury in the past year?: No    Cognitive Screening   1) Repeat 3 items (Leader, Season, Table)    2) Clock draw: NORMAL  3) 3 item recall: Recalls 3 objects  Results: 3 items recalled: COGNITIVE IMPAIRMENT LESS LIKELY    Mini-CogTM Copyright CELIA Peterson. Licensed by the author for use in [facility]; reprinted with permission (estelle@[facility]). All rights reserved.      Do you have sleep apnea, excessive snoring or daytime drowsiness?: yes    Reviewed and updated as needed this visit by clinical staff   Tobacco  Allergies  Meds   Med Hx  Surg Hx  Fam Hx  Soc Hx          Reviewed and updated as needed this visit by Provider                   Social History     Tobacco Use     Smoking status: Never Smoker     Smokeless tobacco: Never Used   Substance Use Topics     Alcohol use: Never     If you drink alcohol do you typically have >3 drinks per day or >7 drinks per week? No    Alcohol Use 9/20/2022   Prescreen: >3 drinks/day or >7 drinks/week? No   Prescreen: >3 drinks/day or >7 drinks/week? -   No flowsheet data found.    Health Care Maintenance:  --wants to do FIT test    Colonoscopy 2015 she wants to do FIT test  instead  Findings:       There were no polyps on examination today.       The perianal exam findings include non-thrombosed external   hemorrhoids        and non-thrombosed internal hemorrhoids (Grade I).       Non-bleeding external and internal hemorrhoids were found   during        retroflexion and were mild.       The exam was otherwise without abnormality on direct and   retroflexion        views.        BP Readings from Last 2 Encounters:   09/20/22 128/86   07/14/22 132/80     LDL Cholesterol Calculated (mg/dL)   Date Value   09/15/2022 85   09/16/2021 73   09/06/2020 85   09/10/2019 158 (H)         Hyperlipidemia Follow-Up      Are you regularly taking any medication or supplement to lower your cholesterol?   Yes- AM    Are you having muscle aches or other side effects that you think could be caused by your cholesterol lowering medication?  Yes- right thigh     Hypertension Follow-up      Do you check your blood pressure regularly outside of the clinic? Yes     Are you following a low salt diet? No    Are your blood pressures ever more than 140 on the top number (systolic) OR more   than 90 on the bottom number (diastolic), for example 140/90? Yes      How many servings of fruits and vegetables do you eat daily?  4 or more    On average, how many sweetened beverages do you drink each day (Examples: soda, juice, sweet tea, etc.  Do NOT count diet or artificially sweetened beverages)?   0    How many days per week do you exercise enough to make your heart beat faster? 5    How many minutes a day do you exercise enough to make your heart beat faster? 20 - 29    How many days per week do you miss taking your medication? 0     Current providers sharing in care for this patient include:   Patient Care Team:  Ayleen Castro DO as PCP - General (Internal Medicine)  Ayleen Castro DO as Assigned PCP  Kurt Bennett PA-C as Assigned Sleep Provider  Nadia Chinchilla APRN CNP as Assigned Heart  "and Vascular Provider    The following health maintenance items are reviewed in Epic and correct as of today:  Health Maintenance   Topic Date Due     ANNUAL REVIEW OF HM ORDERS  09/16/2022     MEDICARE ANNUAL WELLNESS VISIT  09/20/2023     FALL RISK ASSESSMENT  09/20/2023     MAMMO SCREENING  02/14/2024     COLORECTAL CANCER SCREENING  03/16/2025     LIPID  09/15/2027     ADVANCE CARE PLANNING  09/20/2027     DTAP/TDAP/TD IMMUNIZATION (4 - Td or Tdap) 09/11/2029     DEXA  08/01/2032     HEPATITIS C SCREENING  Completed     PHQ-2 (once per calendar year)  Completed     INFLUENZA VACCINE  Completed     Pneumococcal Vaccine: 65+ Years  Completed     ZOSTER IMMUNIZATION  Completed     COVID-19 Vaccine  Completed     IPV IMMUNIZATION  Aged Out     MENINGITIS IMMUNIZATION  Aged Out     HEPATITIS B IMMUNIZATION  Aged Out     Current Outpatient Medications   Medication Sig Dispense Refill     apixaban ANTICOAGULANT (ELIQUIS) 5 MG tablet Take 1 tablet (5 mg) by mouth 2 times daily 60 tablet 11     Ascorbic Acid (VITAMIN C PO) Take 500 mg by mouth daily        losartan-hydrochlorothiazide (HYZAAR) 100-25 MG tablet Take 1 tablet by mouth daily 90 tablet 3     metoprolol succinate ER (TOPROL XL) 25 MG 24 hr tablet Take 1 tablet (25 mg) by mouth 2 times daily 90 tablet 1     rosuvastatin (CRESTOR) 5 MG tablet Take 1 tablet (5 mg) by mouth daily 90 tablet 0     vitamin D3 (CHOLECALCIFEROL) 2000 units (50 mcg) tablet Take 2,000 Units by mouth daily         Review of Systems  Constitutional, HEENT, cardiovascular, pulmonary, GI, , musculoskeletal, neuro, skin, endocrine and psych systems are negative, except as otherwise noted.    OBJECTIVE:   /86 (BP Location: Right arm, Patient Position: Sitting, Cuff Size: Adult Large)   Pulse 62   Temp 98.1  F (36.7  C) (Tympanic)   Resp 12   Ht 1.676 m (5' 6\")   Wt 98.9 kg (218 lb)   SpO2 97%   BMI 35.19 kg/m   Estimated body mass index is 35.19 kg/m  as calculated from the " "following:    Height as of this encounter: 1.676 m (5' 6\").    Weight as of this encounter: 98.9 kg (218 lb).  Physical Exam  GENERAL: healthy, alert and no distress  EYES: Eyes grossly normal to inspection, PERRL and conjunctivae and sclerae normal  HENT: ear canals and TM's normal, nose and mouth without ulcers or lesions  NECK: no adenopathy, no asymmetry, masses, or scars and thyroid normal to palpation  RESP: lungs clear to auscultation - no rales, rhonchi or wheezes  CV: regular rate and rhythm, normal S1 S2, no S3 or S4, no murmur, click or rub, no peripheral edema and peripheral pulses strong  ABDOMEN: soft, nontender, no hepatosplenomegaly, no masses and bowel sounds normal  MS: no gross musculoskeletal defects noted, no edema  SKIN: no suspicious lesions or rashes  NEURO: Normal strength and tone, mentation intact and speech normal  PSYCH: mentation appears normal, affect normal/bright        ASSESSMENT / PLAN:   (Z00.00) Encounter for Medicare annual wellness exam  (primary encounter diagnosis)  Comment:   Plan:     (Z86.010) Personal history of colonic polyps  Comment: hyperplastic, prefers fit test.  Given today  Plan:     (I48.0) Paroxysmal atrial fibrillation (H)  Comment: Follows with cardiology.  Recently increased metoprolol.  They filled the anticoagulant and beta-blocker  Plan:     (I10) Essential hypertension  Comment:  - stable, refill provided  Plan: losartan-hydrochlorothiazide (HYZAAR) 100-25 MG        tablet            (E78.5) Hyperlipidemia LDL goal <130  Comment:  - stable, refill provided.  Cholesterol is much better controlled on the statin.  She has no side effects  Plan: rosuvastatin (CRESTOR) 5 MG tablet            (E66.01) Morbid obesity (H)  Comment: Known issue that I take into account for their medical decisions, no current exacerbations or new concerns.  Contributes to A. fib and hypertension  Plan:     (Z23) Need for prophylactic vaccination and inoculation against " "influenza  Comment:   Plan: INFLUENZA, QUAD, HIGH DOSE, PF, 65YR + (FLUZONE        HD), CANCELED: INFLUENZA, QUAD, HIGH DOSE, PF,         65YR + (FLUZONE HD)            (Z12.11) Screen for colon cancer  Comment:   Plan: Fecal colorectal cancer screen (FIT)              Patient has been advised of split billing requirements and indicates understanding: Yes    COUNSELING:  Reviewed preventive health counseling, as reflected in patient instructions    Estimated body mass index is 35.19 kg/m  as calculated from the following:    Height as of this encounter: 1.676 m (5' 6\").    Weight as of this encounter: 98.9 kg (218 lb).    Weight management plan: Discussed healthy diet and exercise guidelines    She reports that she has never smoked. She has never used smokeless tobacco.      Appropriate preventive services were discussed with this patient, including applicable screening as appropriate for cardiovascular disease, diabetes, osteopenia/osteoporosis, and glaucoma.  As appropriate for age/gender, discussed screening for colorectal cancer, prostate cancer, breast cancer, and cervical cancer. Checklist reviewing preventive services available has been given to the patient.    Reviewed patients plan of care and provided an AVS. The Complex Care Plan (for patients with higher acuity and needing more deliberate coordination of services) for Kelly meets the Care Plan requirement. This Care Plan has been established and reviewed with the Patient.    Counseling Resources:  ATP IV Guidelines  Pooled Cohorts Equation Calculator  Breast Cancer Risk Calculator  Breast Cancer: Medication to Reduce Risk  FRAX Risk Assessment  ICSI Preventive Guidelines  Dietary Guidelines for Americans, 2010  USDA's MyPlate  ASA Prophylaxis  Lung CA Screening    Ayleen Castro DO  [facility]    Identified Health Risks:  "

## 2022-09-20 NOTE — PATIENT INSTRUCTIONS
Health Care Maintenance:  You are due for stool test for colon cancer screening  Flu shot today    Hypertension/ hyperlipidemia   Refills sent      Patient Education   Personalized Prevention Plan  You are due for the preventive services outlined below.  Your care team is available to assist you in scheduling these services.  If you have already completed any of these items, please share that information with your care team to update in your medical record.  Health Maintenance Due   Topic Date Due    ANNUAL REVIEW OF HM ORDERS  09/16/2022

## 2022-10-04 ENCOUNTER — APPOINTMENT (OUTPATIENT)
Dept: LAB | Facility: CLINIC | Age: 72
End: 2022-10-04
Payer: COMMERCIAL

## 2022-10-04 PROCEDURE — 82274 ASSAY TEST FOR BLOOD FECAL: CPT

## 2022-10-05 DIAGNOSIS — Z12.11 SCREEN FOR COLON CANCER: ICD-10-CM

## 2022-10-05 LAB — HEMOCCULT STL QL IA: NEGATIVE

## 2022-10-05 NOTE — LETTER
October 12, 2022      Kelly Mckeon  [address] [facility] [address]        Dear ,    We are writing to inform you of your test results.    Negative for colon cancer, repeat test in 1 year    Resulted Orders   Fecal colorectal cancer screen (FIT)   Result Value Ref Range    Occult Blood Screen FIT Negative Negative       If you have any questions or concerns, please call the clinic at the number listed above.       Sincerely,      Ayleen Castro, DO

## 2022-10-22 DIAGNOSIS — I48.0 PAROXYSMAL ATRIAL FIBRILLATION (H): ICD-10-CM

## 2022-10-24 RX ORDER — APIXABAN 5 MG/1
TABLET, FILM COATED ORAL
Qty: 540 TABLET | Refills: 0 | Status: SHIPPED | OUTPATIENT
Start: 2022-10-24 | End: 2023-07-14

## 2022-12-23 DIAGNOSIS — I48.0 PAROXYSMAL ATRIAL FIBRILLATION (H): ICD-10-CM

## 2022-12-23 DIAGNOSIS — I10 ESSENTIAL HYPERTENSION: Primary | ICD-10-CM

## 2022-12-23 RX ORDER — METOPROLOL SUCCINATE 25 MG/1
TABLET, EXTENDED RELEASE ORAL
Qty: 90 TABLET | Refills: 0 | Status: SHIPPED | OUTPATIENT
Start: 2022-12-23 | End: 2023-02-03

## 2023-02-15 ENCOUNTER — OFFICE VISIT (OUTPATIENT)
Dept: CARDIOLOGY | Facility: CLINIC | Age: 73
End: 2023-02-15
Payer: COMMERCIAL

## 2023-02-15 VITALS
HEART RATE: 60 BPM | WEIGHT: 221 LBS | BODY MASS INDEX: 35.67 KG/M2 | DIASTOLIC BLOOD PRESSURE: 86 MMHG | SYSTOLIC BLOOD PRESSURE: 138 MMHG | RESPIRATION RATE: 14 BRPM

## 2023-02-15 DIAGNOSIS — I48.0 PAROXYSMAL ATRIAL FIBRILLATION (H): ICD-10-CM

## 2023-02-15 DIAGNOSIS — I10 ESSENTIAL HYPERTENSION: ICD-10-CM

## 2023-02-15 PROCEDURE — 99214 OFFICE O/P EST MOD 30 MIN: CPT | Performed by: INTERNAL MEDICINE

## 2023-02-15 NOTE — PROGRESS NOTES
[facility] HEART CARE   [address], [place] [address]   [email or web address]   OFFICE: [phone]          Thank you Ayleen Harding for asking the [facility] Heart Care team to participate in the care of your patient, Kelly Mckeon.     Impression and Plan       1.  Atrial fibrillation (paroxysmal).  She has been maintained on apixaban for CVA prophylaxis.    Plan to continue apixaban.    Continue current dose of metoprolol.    2.  Hypertension.  Blood pressure is mildly elevated in the office today.  She has had favorable readings at home.  She will continue to monitor blood pressure and simply call should she have a tendency toward continued higher readings.    3.  Obstructive sleep apnea.  Kelly was diagnosed with mild obstructive sleep apnea on polysomnogram.  She had opted to defer CPAP therapy.    Plan on follow-up in 1 year.      35 minutes spent reviewing prior records (including documentation, laboratory studies, cardiac testing/imaging), interview with patient along with physical exam, planning, and subsequent documentation/crafting of note).           History of Present Illness    Once again I would like to thank you again for asking me to participate in the care of your patient, Kelly Mckeon.  As you know, but to reiterate for my own records, Kelly Mckeon is a 72 year old female with history of paroxysmal atrial fibrillation.    Since my last visit with Kelly, she reports no subjective palpitations reminiscent of her atrial fibrillation.  She historically has been keenly aware of the rhythm disturbance when it has been present.  She denies any other cardiac symptoms of concern.    Further review of systems is otherwise negative/noncontributory (medical record and 13 point review of systems reviewed as well and pertinent positives noted).         Cardiac Diagnostics      Echocardiogram 10 September 2021:  1. Normal left  ventricular size and systolic performance with ejection fraction of 60 to 65%.  2. No significant valvular heart disease.  3. Normal right ventricular size and systolic performance.  4. Normal atrial dimensions.    Ambulatory monitor x25 days September/October 2021:  1. Predominantly sinus rhythm.  2. Paroxysmal atrial fibrillation - single episode lasting 2hrs 26min associated with borderline high average ventricular rate.  3. Symptom triggers (3) correlated to atrial fibrillation, sinus rhythm with isolated PAC.            Physical Examination       /86 (BP Location: Right arm, Patient Position: Sitting, Cuff Size: Adult Large)   Pulse 60   Resp 14   Wt 100.2 kg (221 lb)   BMI 35.67 kg/m          Wt Readings from Last 3 Encounters:   02/15/23 100.2 kg (221 lb)   09/20/22 98.9 kg (218 lb)   07/14/22 102.1 kg (225 lb)     The patient is alert and oriented times three. Sclerae are anicteric. Mucosal membranes are moist. Jugular venous pressure is normal. No significant adenopathy/thyromegally appreciated. Lungs are clear with good expansion. On cardiovascular exam, the patient has a regular S1 and S2. Abdomen is soft and non-tender. Extremities reveal no clubbing, cyanosis, or edema.         Medications  Allergies   Current Outpatient Medications   Medication Sig Dispense Refill     Ascorbic Acid (VITAMIN C PO) Take 500 mg by mouth daily        ELIQUIS ANTICOAGULANT 5 MG tablet TAKE 1 TABLET BY MOUTH 2 TIMES DAILY 540 tablet 0     losartan-hydrochlorothiazide (HYZAAR) 100-25 MG tablet Take 1 tablet by mouth daily 90 tablet 3     metoprolol succinate ER (TOPROL XL) 25 MG 24 hr tablet TAKE 1 TABLET BY MOUTH 2 TIMES DAILY 180 tablet 0     rosuvastatin (CRESTOR) 5 MG tablet Take 1 tablet (5 mg) by mouth daily 90 tablet 3     vitamin D3 (CHOLECALCIFEROL) 2000 units (50 mcg) tablet Take 2,000 Units by mouth daily         Allergies   Allergen Reactions     Definity      Lisinopril Cough     Perflutren Lipid  Microsphere [Propane]      Severe back pain     Seasonal Allergies           Lab Results    Chemistry/lipid CBC Cardiac Enzymes/BNP/TSH/INR   Recent Labs   Lab Test 09/15/22  0930   CHOL 173   HDL 76   LDL 85   TRIG 60     Recent Labs   Lab Test 09/15/22  0930 09/16/21  1107 09/06/20  0901   LDL 85 73 85     Recent Labs   Lab Test 09/15/22  0930      POTASSIUM 3.7   CHLORIDE 103   CO2 27   GLC 92   BUN 14.7   CR 0.98*   GFRESTIMATED 61   CONSTANTIN 8.7*     Recent Labs   Lab Test 09/15/22  0930 09/16/21  1107 02/02/21  1628   CR 0.98* 0.81 0.90     No results for input(s): A1C in the last 84477 hours.       Recent Labs   Lab Test 09/15/22  0930   WBC 6.4   HGB 13.2   HCT 39.3   MCV 90        Recent Labs   Lab Test 09/15/22  0930 02/02/21  1628   HGB 13.2 13.5    No results for input(s): TROPONINI in the last 86669 hours.  No results for input(s): BNP, NTBNPI, NTBNP in the last 99693 hours.  Recent Labs   Lab Test 02/02/21  1628   TSH 0.76     No results for input(s): INR in the last 39203 hours.     Medical History  Surgical History Family History Social History   No past medical history on file.  Past Surgical History:   Procedure Laterality Date     AS REPAIR/GRAFT ACHILLES TENDON Right 2011     benign ovarian tumor removal  1999     wisdom teeth       ZZC MOLE REMOVAL (LESIONS) TIER 1       Family History   Problem Relation Age of Onset     Heart Failure Mother      Emphysema Mother      Melanoma Mother      No Known Problems Father         Social History     Socioeconomic History     Marital status:      Spouse name: Not on file     Number of children: Not on file     Years of education: Not on file     Highest education level: Not on file   Occupational History     Not on file   Tobacco Use     Smoking status: Never     Smokeless tobacco: Never   Vaping Use     Vaping Use: Never used   Substance and Sexual Activity     Alcohol use: Never     Drug use: Never     Sexual activity: Yes     Partners:  Male   Other Topics Concern     Not on file   Social History Narrative     Not on file     Social Determinants of Health     Financial Resource Strain: Not on file   Food Insecurity: Not on file   Transportation Needs: Not on file   Physical Activity: Not on file   Stress: Not on file   Social Connections: Not on file   Intimate Partner Violence: Not on file   Housing Stability: Not on file

## 2023-02-15 NOTE — LETTER
2/15/2023    Ayleen Castro, DO  [address] [place] [address]    RE: Kelly Mckeon       Dear Colleague,     I had the pleasure of seeing Kelly Mckeon in the [facility].         [facility] HEART CARE   [address], [place] [address]   [email or web address]   OFFICE: [phone]          Thank you Dr. Castro, Ayleen Thrasher for asking the [facility] Heart Care team to participate in the care of your patient, Kelly Mckeon.     Impression and Plan       1.  Atrial fibrillation (paroxysmal).  She has been maintained on apixaban for CVA prophylaxis.    Plan to continue apixaban.    Continue current dose of metoprolol.    2.  Hypertension.  Blood pressure is mildly elevated in the office today.  She has had favorable readings at home.  She will continue to monitor blood pressure and simply call should she have a tendency toward continued higher readings.    3.  Obstructive sleep apnea.  Kelly was diagnosed with mild obstructive sleep apnea on polysomnogram.  She had opted to defer CPAP therapy.    Plan on follow-up in 1 year.      35 minutes spent reviewing prior records (including documentation, laboratory studies, cardiac testing/imaging), interview with patient along with physical exam, planning, and subsequent documentation/crafting of note).           History of Present Illness    Once again I would like to thank you again for asking me to participate in the care of your patient, Kelly Mckeon.  As you know, but to reiterate for my own records, Kelly Mckeon is a 72 year old female with history of paroxysmal atrial fibrillation.    Since my last visit with Kelly, she reports no subjective palpitations reminiscent of her atrial fibrillation.  She historically has been keenly aware of the rhythm disturbance when it has been present.  She denies any other cardiac symptoms of concern.    Further review of systems is otherwise  negative/noncontributory (medical record and 13 point review of systems reviewed as well and pertinent positives noted).         Cardiac Diagnostics      Echocardiogram 10 September 2021:  1. Normal left ventricular size and systolic performance with ejection fraction of 60 to 65%.  2. No significant valvular heart disease.  3. Normal right ventricular size and systolic performance.  4. Normal atrial dimensions.    Ambulatory monitor x25 days September/October 2021:  1. Predominantly sinus rhythm.  2. Paroxysmal atrial fibrillation - single episode lasting 2hrs 26min associated with borderline high average ventricular rate.  3. Symptom triggers (3) correlated to atrial fibrillation, sinus rhythm with isolated PAC.            Physical Examination       /86 (BP Location: Right arm, Patient Position: Sitting, Cuff Size: Adult Large)   Pulse 60   Resp 14   Wt 100.2 kg (221 lb)   BMI 35.67 kg/m          Wt Readings from Last 3 Encounters:   02/15/23 100.2 kg (221 lb)   09/20/22 98.9 kg (218 lb)   07/14/22 102.1 kg (225 lb)     The patient is alert and oriented times three. Sclerae are anicteric. Mucosal membranes are moist. Jugular venous pressure is normal. No significant adenopathy/thyromegally appreciated. Lungs are clear with good expansion. On cardiovascular exam, the patient has a regular S1 and S2. Abdomen is soft and non-tender. Extremities reveal no clubbing, cyanosis, or edema.         Medications  Allergies   Current Outpatient Medications   Medication Sig Dispense Refill     Ascorbic Acid (VITAMIN C PO) Take 500 mg by mouth daily        ELIQUIS ANTICOAGULANT 5 MG tablet TAKE 1 TABLET BY MOUTH 2 TIMES DAILY 540 tablet 0     losartan-hydrochlorothiazide (HYZAAR) 100-25 MG tablet Take 1 tablet by mouth daily 90 tablet 3     metoprolol succinate ER (TOPROL XL) 25 MG 24 hr tablet TAKE 1 TABLET BY MOUTH 2 TIMES DAILY 180 tablet 0     rosuvastatin (CRESTOR) 5 MG tablet Take 1 tablet (5 mg) by mouth daily  90 tablet 3     vitamin D3 (CHOLECALCIFEROL) 2000 units (50 mcg) tablet Take 2,000 Units by mouth daily         Allergies   Allergen Reactions     Definity      Lisinopril Cough     Perflutren Lipid Microsphere [Propane]      Severe back pain     Seasonal Allergies           Lab Results    Chemistry/lipid CBC Cardiac Enzymes/BNP/TSH/INR   Recent Labs   Lab Test 09/15/22  0930   CHOL 173   HDL 76   LDL 85   TRIG 60     Recent Labs   Lab Test 09/15/22  0930 09/16/21  1107 09/06/20  0901   LDL 85 73 85     Recent Labs   Lab Test 09/15/22  0930      POTASSIUM 3.7   CHLORIDE 103   CO2 27   GLC 92   BUN 14.7   CR 0.98*   GFRESTIMATED 61   CONSTANTIN 8.7*     Recent Labs   Lab Test 09/15/22  0930 09/16/21  1107 02/02/21  1628   CR 0.98* 0.81 0.90     No results for input(s): A1C in the last 36475 hours.       Recent Labs   Lab Test 09/15/22  0930   WBC 6.4   HGB 13.2   HCT 39.3   MCV 90        Recent Labs   Lab Test 09/15/22  0930 02/02/21  1628   HGB 13.2 13.5    No results for input(s): TROPONINI in the last 91031 hours.  No results for input(s): BNP, NTBNPI, NTBNP in the last 03289 hours.  Recent Labs   Lab Test 02/02/21  1628   TSH 0.76     No results for input(s): INR in the last 00103 hours.     Medical History  Surgical History Family History Social History   No past medical history on file.  Past Surgical History:   Procedure Laterality Date     AS REPAIR/GRAFT ACHILLES TENDON Right 2011     benign ovarian tumor removal  1999     wisdom teeth       ZZC MOLE REMOVAL (LESIONS) TIER 1       Family History   Problem Relation Age of Onset     Heart Failure Mother      Emphysema Mother      Melanoma Mother      No Known Problems Father         Social History     Socioeconomic History     Marital status:      Spouse name: Not on file     Number of children: Not on file     Years of education: Not on file     Highest education level: Not on file   Occupational History     Not on file   Tobacco Use     Smoking  status: Never     Smokeless tobacco: Never   Vaping Use     Vaping Use: Never used   Substance and Sexual Activity     Alcohol use: Never     Drug use: Never     Sexual activity: Yes     Partners: Male   Other Topics Concern     Not on file   Social History Narrative     Not on file     Social Determinants of Health     Financial Resource Strain: Not on file   Food Insecurity: Not on file   Transportation Needs: Not on file   Physical Activity: Not on file   Stress: Not on file   Social Connections: Not on file   Intimate Partner Violence: Not on file   Housing Stability: Not on file                    Thank you for allowing me to participate in the care of your patient.      Sincerely,     Grecia Rivera MD     [facility]  cc:   Grecia Rivera MD  [address] [id number]

## 2023-04-28 ENCOUNTER — NURSE TRIAGE (OUTPATIENT)
Dept: NURSING | Facility: CLINIC | Age: 73
End: 2023-04-28
Payer: COMMERCIAL

## 2023-04-29 ENCOUNTER — VIRTUAL VISIT (OUTPATIENT)
Dept: URGENT CARE | Facility: CLINIC | Age: 73
End: 2023-04-29
Payer: COMMERCIAL

## 2023-04-29 DIAGNOSIS — U07.1 COVID-19: Primary | ICD-10-CM

## 2023-04-29 PROCEDURE — 99443 PR PHYSICIAN TELEPHONE EVALUATION 21-30 MIN: CPT | Mod: CS

## 2023-04-29 NOTE — PROGRESS NOTES
"Kelly is a 72 year old who is being evaluated via a billable telephone visit.      What phone number would you like to be contacted at? [phone]  How would you like to obtain your AVS? Martha    Distant Location (provider location):  Off-site    Assessment & Plan     (U07.1) COVID-19  (primary encounter diagnosis)    Plan: molnupiravir (LAGEVRIO) 200 MG  capsule    BMI:   Estimated body mass index is 35.67 kg/m  as calculated from the following:    Height as of 9/20/22: 1.676 m (5' 6\").    Weight as of 2/15/23: 100.2 kg (221 lb).   Weight management plan: Patient was referred to their PCP to discuss a diet and exercise plan.    [facility]  Virtual Urgent Care  [facility] VIRTUAL URGENT CARE    Subjective   Kelly is a 72 year old, presenting for the following health issues:  COVID TX    HPI     Dx covid yesterday  Sx started yesterday and consist of fever headache congestion fatigue  No sob wheezing chest pain  Hydrated  Taking tylenol      Objective           Vitals:  No vitals were obtained today due to virtual visit.    Physical Exam   healthy, alert and no distress  PSYCH: Alert and oriented times 3; coherent speech, normal   rate and volume, able to articulate logical thoughts, able   to abstract reason, no tangential thoughts, no hallucinations   or delusions  Her affect is normal  RESP: No cough, no audible wheezing, able to talk in full sentences  Remainder of exam unable to be completed due to telephone visits      Phone call duration: 25 minutes    "

## 2023-04-29 NOTE — TELEPHONE ENCOUNTER
"Pt reports she had a positive at home Covid test today. Pt denies exposure that she is aware of. Pt reports she has a fever of 101.7, mild headache, sniffles and mild nausea. Pt reports fever is worst symptom. Symptoms started this morning. Pt reports breathing is \"slightly different like when you are tired\". Pt denies difficulty breathing. Pt reports she has hypertension, obesity, a-fib. Pt reports she has had five Covid vaccines. Pt does not monitor her oxygen levels.     Nurse Triage SBAR    Is this a 2nd Level Triage? YES, LICENSED PRACTITIONER REVIEW IS REQUIRED    Situation:   Positive Covid today     Background:   See above     Assessment:   Covid     Protocol Recommended Disposition:   See HCP Within 4 Hours (Or PCP Triage)    Recommendation:   Second level triage     Paged to provider on call Dr. Eckert who advises unless pt very uncomfortable with chills/rigor or severe discomfort, shortness of breath etc ok to schedule for VV tomorrow or pt can be seen in UC tomorrow if no VV available.     Provider Recommendation Follow Up:   Reached patient/caregiver. Informed of provider's recommendations. Patient verbalized understanding and agrees with the plan.     Transferred to scheduling for virtual visit tomorrow.         Does the patient meet one of the following criteria for ADS visit consideration? 16+ years old, with an [facility] PCP     TIP  Providers, please consider if this condition is appropriate for management at one of our Acute and Diagnostic Services sites.     If patient is a good candidate, please use dotphrase <dot>triageresponse and select Refer to ADS to document.    Reason for Disposition    [1] Fever > 101 F (38.3 C) AND [2] age > 60 years    Additional Information    Negative: SEVERE difficulty breathing (e.g., struggling for each breath, speaks in single words)    Negative: Difficult to awaken or acting confused (e.g., disoriented, slurred speech)    Negative: Bluish (or gray) lips or face " now    Negative: Shock suspected (e.g., cold/pale/clammy skin, too weak to stand, low BP, rapid pulse)    Negative: Sounds like a life-threatening emergency to the triager    Negative: [1] Diagnosed or suspected COVID-19 AND [2] symptoms lasting 3 or more weeks    Negative: [1] COVID-19 exposure AND [2] no symptoms    Negative: COVID-19 vaccine reaction suspected (e.g., fever, headache, muscle aches) occurring 1 to 3 days after getting vaccine    Negative: COVID-19 vaccine, questions about    Negative: [1] Lives with someone known to have influenza (flu test positive) AND [2] flu-like symptoms (e.g., cough, runny nose, sore throat, SOB; with or without fever)    Negative: [1] Adult with possible COVID-19 symptoms AND [2] triager concerned about severity of symptoms or other causes    Negative: COVID-19 and breastfeeding, questions about    Negative: SEVERE or constant chest pain or pressure  (Exception: Mild central chest pain, present only when coughing.)    Negative: MODERATE difficulty breathing (e.g., speaks in phrases, SOB even at rest, pulse 100-120)    Negative: [1] Headache AND [2] stiff neck (can't touch chin to chest)    Negative: Oxygen level (e.g., pulse oximetry) 90 percent or lower    Negative: Chest pain or pressure    Negative: Patient sounds very sick or weak to the triager    Negative: MILD difficulty breathing (e.g., minimal/no SOB at rest, SOB with walking, pulse <100)    Protocols used: CORONAVIRUS (COVID-19) DIAGNOSED OR [id number]

## 2023-05-08 DIAGNOSIS — I48.0 PAROXYSMAL ATRIAL FIBRILLATION (H): ICD-10-CM

## 2023-05-08 RX ORDER — METOPROLOL SUCCINATE 25 MG/1
25 TABLET, EXTENDED RELEASE ORAL 2 TIMES DAILY
Qty: 180 TABLET | Refills: 3 | Status: SHIPPED | OUTPATIENT
Start: 2023-05-08 | End: 2024-05-06

## 2023-07-12 ENCOUNTER — MYC MEDICAL ADVICE (OUTPATIENT)
Dept: FAMILY MEDICINE | Facility: CLINIC | Age: 73
End: 2023-07-12
Payer: COMMERCIAL

## 2023-07-13 ENCOUNTER — DOCUMENTATION ONLY (OUTPATIENT)
Dept: LAB | Facility: CLINIC | Age: 73
End: 2023-07-13
Payer: COMMERCIAL

## 2023-07-13 DIAGNOSIS — I10 ESSENTIAL HYPERTENSION: Primary | ICD-10-CM

## 2023-07-13 DIAGNOSIS — E78.5 HYPERLIPIDEMIA LDL GOAL <130: ICD-10-CM

## 2023-07-13 NOTE — PROGRESS NOTES
Please place or confirm orders for upcoming lab appointment on 07/20/2023     Thank You  Jovita TERRY CMA.

## 2023-07-14 DIAGNOSIS — I48.0 PAROXYSMAL ATRIAL FIBRILLATION (H): ICD-10-CM

## 2023-07-14 RX ORDER — APIXABAN 5 MG/1
TABLET, FILM COATED ORAL
Qty: 180 TABLET | Refills: 2 | Status: SHIPPED | OUTPATIENT
Start: 2023-07-14 | End: 2024-04-15

## 2023-07-21 ENCOUNTER — LAB (OUTPATIENT)
Dept: LAB | Facility: CLINIC | Age: 73
End: 2023-07-21
Payer: COMMERCIAL

## 2023-07-21 DIAGNOSIS — I10 ESSENTIAL HYPERTENSION: ICD-10-CM

## 2023-07-21 DIAGNOSIS — E78.5 HYPERLIPIDEMIA LDL GOAL <130: ICD-10-CM

## 2023-07-21 LAB
ANION GAP SERPL CALCULATED.3IONS-SCNC: 9 MMOL/L (ref 7–15)
BUN SERPL-MCNC: 16.2 MG/DL (ref 8–23)
CALCIUM SERPL-MCNC: 9.3 MG/DL (ref 8.8–10.2)
CHLORIDE SERPL-SCNC: 106 MMOL/L (ref 98–107)
CHOLEST SERPL-MCNC: 190 MG/DL
CREAT SERPL-MCNC: 0.89 MG/DL (ref 0.51–0.95)
DEPRECATED HCO3 PLAS-SCNC: 26 MMOL/L (ref 22–29)
ERYTHROCYTE [DISTWIDTH] IN BLOOD BY AUTOMATED COUNT: 12.7 % (ref 10–15)
GFR SERPL CREATININE-BSD FRML MDRD: 69 ML/MIN/1.73M2
GLUCOSE SERPL-MCNC: 93 MG/DL (ref 70–99)
HCT VFR BLD AUTO: 39.2 % (ref 35–47)
HDLC SERPL-MCNC: 77 MG/DL
HGB BLD-MCNC: 13.1 G/DL (ref 11.7–15.7)
LDLC SERPL CALC-MCNC: 94 MG/DL
MCH RBC QN AUTO: 30.7 PG (ref 26.5–33)
MCHC RBC AUTO-ENTMCNC: 33.4 G/DL (ref 31.5–36.5)
MCV RBC AUTO: 92 FL (ref 78–100)
NONHDLC SERPL-MCNC: 113 MG/DL
PLATELET # BLD AUTO: 219 10E3/UL (ref 150–450)
POTASSIUM SERPL-SCNC: 3.7 MMOL/L (ref 3.4–5.3)
RBC # BLD AUTO: 4.27 10E6/UL (ref 3.8–5.2)
SODIUM SERPL-SCNC: 141 MMOL/L (ref 136–145)
TRIGL SERPL-MCNC: 96 MG/DL
WBC # BLD AUTO: 5.9 10E3/UL (ref 4–11)

## 2023-07-21 PROCEDURE — 36415 COLL VENOUS BLD VENIPUNCTURE: CPT

## 2023-07-21 PROCEDURE — 80061 LIPID PANEL: CPT

## 2023-07-21 PROCEDURE — 85027 COMPLETE CBC AUTOMATED: CPT

## 2023-07-21 PROCEDURE — 80048 BASIC METABOLIC PNL TOTAL CA: CPT

## 2023-09-21 ENCOUNTER — TRANSFERRED RECORDS (OUTPATIENT)
Dept: HEALTH INFORMATION MANAGEMENT | Facility: CLINIC | Age: 73
End: 2023-09-21
Payer: COMMERCIAL

## 2023-09-26 ENCOUNTER — OFFICE VISIT (OUTPATIENT)
Dept: FAMILY MEDICINE | Facility: CLINIC | Age: 73
End: 2023-09-26
Payer: COMMERCIAL

## 2023-09-26 VITALS
RESPIRATION RATE: 20 BRPM | BODY MASS INDEX: 36.94 KG/M2 | OXYGEN SATURATION: 99 % | WEIGHT: 221.7 LBS | TEMPERATURE: 98.6 F | HEIGHT: 65 IN | HEART RATE: 63 BPM | SYSTOLIC BLOOD PRESSURE: 136 MMHG | DIASTOLIC BLOOD PRESSURE: 72 MMHG

## 2023-09-26 DIAGNOSIS — I48.0 PAROXYSMAL ATRIAL FIBRILLATION (H): ICD-10-CM

## 2023-09-26 DIAGNOSIS — E66.01 MORBID OBESITY (H): ICD-10-CM

## 2023-09-26 DIAGNOSIS — Z12.31 VISIT FOR SCREENING MAMMOGRAM: ICD-10-CM

## 2023-09-26 DIAGNOSIS — E78.5 HYPERLIPIDEMIA LDL GOAL <130: ICD-10-CM

## 2023-09-26 DIAGNOSIS — Z00.00 ENCOUNTER FOR MEDICARE ANNUAL WELLNESS EXAM: Primary | ICD-10-CM

## 2023-09-26 DIAGNOSIS — I10 ESSENTIAL HYPERTENSION: ICD-10-CM

## 2023-09-26 PROCEDURE — 90662 IIV NO PRSV INCREASED AG IM: CPT | Performed by: INTERNAL MEDICINE

## 2023-09-26 PROCEDURE — 99214 OFFICE O/P EST MOD 30 MIN: CPT | Mod: 25 | Performed by: INTERNAL MEDICINE

## 2023-09-26 PROCEDURE — G0439 PPPS, SUBSEQ VISIT: HCPCS | Performed by: INTERNAL MEDICINE

## 2023-09-26 PROCEDURE — G0008 ADMIN INFLUENZA VIRUS VAC: HCPCS | Performed by: INTERNAL MEDICINE

## 2023-09-26 RX ORDER — ROSUVASTATIN CALCIUM 5 MG/1
5 TABLET, COATED ORAL DAILY
Qty: 90 TABLET | Refills: 3 | Status: SHIPPED | OUTPATIENT
Start: 2023-09-26 | End: 2024-10-02

## 2023-09-26 RX ORDER — LOSARTAN POTASSIUM AND HYDROCHLOROTHIAZIDE 25; 100 MG/1; MG/1
1 TABLET ORAL DAILY
Qty: 90 TABLET | Refills: 3 | Status: SHIPPED | OUTPATIENT
Start: 2023-09-26 | End: 2024-10-02

## 2023-09-26 ASSESSMENT — ENCOUNTER SYMPTOMS
BREAST MASS: 0
FEVER: 0
FREQUENCY: 0
HEMATOCHEZIA: 0
CHILLS: 0
ABDOMINAL PAIN: 0
WEAKNESS: 0
PARESTHESIAS: 0
SHORTNESS OF BREATH: 0
HEADACHES: 0
SORE THROAT: 0
MYALGIAS: 0
HEARTBURN: 0
JOINT SWELLING: 1
CONSTIPATION: 0
NERVOUS/ANXIOUS: 0
DYSURIA: 0
ARTHRALGIAS: 1
HEMATURIA: 0
COUGH: 0
NAUSEA: 0
PALPITATIONS: 0
DIARRHEA: 0
EYE PAIN: 0
DIZZINESS: 0

## 2023-09-26 ASSESSMENT — ACTIVITIES OF DAILY LIVING (ADL): CURRENT_FUNCTION: NO ASSISTANCE NEEDED

## 2023-09-26 ASSESSMENT — PAIN SCALES - GENERAL: PAINLEVEL: NO PAIN (0)

## 2023-09-26 NOTE — PATIENT INSTRUCTIONS
"You are due for a mammogram.  Please call [phone] to schedule.  Refills sent  Order placed for blood work in 1 year      Patient Education   Personalized Prevention Plan  You are due for the preventive services outlined below.  Your care team is available to assist you in scheduling these services.  If you have already completed any of these items, please share that information with your care team to update in your medical record.  Health Maintenance Due   Topic Date Due    COVID-19 Vaccine (6 - Moderna series) 01/09/2023    ANNUAL REVIEW OF HM ORDERS  09/20/2023     Learning About Being Physically Active  What is physical activity?     Being physically active means doing any kind of activity that gets your body moving.  The types of physical activity that can help you get fit and stay healthy include:  Aerobic or \"cardio\" activities. These make your heart beat faster and make you breathe harder, such as brisk walking, riding a bike, or running. They strengthen your heart and lungs and build up your endurance.  Strength training activities. These make your muscles work against, or \"resist,\" something. Examples include lifting weights or doing push-ups. These activities help tone and strengthen your muscles and bones.  Stretches. These let you move your joints and muscles through their full range of motion. Stretching helps you be more flexible.  Reaching a balance between these three types of physical activity is important because each one contributes to your overall fitness.  What are the benefits of being active?  Being active is one of the best things you can do for your health. It helps you to:  Feel stronger and have more energy to do all the things you like to do.  Focus better at school or work.  Feel, think, and sleep better.  Reach and stay at a healthy weight.  Lose fat and build lean muscle.  Lower your risk for serious health problems, including diabetes, heart attack, high blood pressure, and some " "cancers.  Keep your heart, lungs, bones, muscles, and joints strong and healthy.  How can you make being active part of your life?  Start slowly. Make it your long-term goal to get at least 30 minutes of exercise on most days of the week. Walking is a good choice. You also may want to do other activities, such as running, swimming, cycling, or playing tennis or team sports.  Pick activities that you like--ones that make your heart beat faster, your muscles stronger, and your muscles and joints more flexible. If you find more than one thing you like doing, do them all. You don't have to do the same thing every day.  Get your heart pumping every day. Any activity that makes your heart beat faster and keeps it at that rate for a while counts.  Here are some great ways to get your heart beating faster:  Go for a brisk walk, run, or bike ride.  Go for a hike or swim.  Go in-line skating.  Play a game of touch football, basketball, or soccer.  Ride a bike.  Play tennis or racquetball.  Climb stairs.  Even some household chores can be aerobic--just do them at a faster pace. Vacuuming, raking or mowing the lawn, sweeping the garage, and washing and waxing the car all can help get your heart rate up.  Strengthen your muscles during the week. You don't have to lift heavy weights or grow big, bulky muscles to get stronger. Doing a few simple activities that make your muscles work against, or \"resist,\" something can help you get stronger.  For example, you can:  Do push-ups or sit-ups, which use your own body weight as resistance.  Lift weights or dumbbells or use stretch bands at home or in a gym or community center.  Stretch your muscles often. Stretching will help you as you become more active. It can help you stay flexible, loosen tight muscles, and avoid injury. It can also help improve your balance and posture and can be a great way to relax.  Be sure to stretch the muscles you'll be using when you work out. It's best to " "warm your muscles slightly before you stretch them. Walk or do some other light aerobic activity for a few minutes, and then start stretching.  When you stretch your muscles:  Do it slowly. Stretching is not about going fast or making sudden movements.  Don't push or bounce during a stretch.  Hold each stretch for at least 15 to 30 seconds, if you can. You should feel a stretch in the muscle, but not pain.  Breathe out as you do the stretch. Then breathe in as you hold the stretch. Don't hold your breath.  If you're worried about how more activity might affect your health, have a checkup before you start. Follow any special advice your doctor gives you for getting a smart start.  Where can you learn more?  Go to https://www.Health 123.net/patiented  Enter W332 in the search box to learn more about \"Learning About Being Physically Active.\"  Current as of: October 10, 2022               Content Version: 13.7    0375-9784 [employer].   Care instructions adapted under license by your healthcare professional. If you have questions about a medical condition or this instruction, always ask your healthcare professional. [employer] disclaims any warranty or liability for your use of this information.         Patient Education   Personalized Prevention Plan  You are due for the preventive services outlined below.  Your care team is available to assist you in scheduling these services.  If you have already completed any of these items, please share that information with your care team to update in your medical record.  Health Maintenance Due   Topic Date Due    COVID-19 Vaccine (6 - Moderna series) 01/09/2023    ANNUAL REVIEW OF HM ORDERS  09/20/2023     Learning About Being Physically Active  What is physical activity?     Being physically active means doing any kind of activity that gets your body moving.  The types of physical activity that can help you get fit and stay healthy include:  Aerobic " "or \"cardio\" activities. These make your heart beat faster and make you breathe harder, such as brisk walking, riding a bike, or running. They strengthen your heart and lungs and build up your endurance.  Strength training activities. These make your muscles work against, or \"resist,\" something. Examples include lifting weights or doing push-ups. These activities help tone and strengthen your muscles and bones.  Stretches. These let you move your joints and muscles through their full range of motion. Stretching helps you be more flexible.  Reaching a balance between these three types of physical activity is important because each one contributes to your overall fitness.  What are the benefits of being active?  Being active is one of the best things you can do for your health. It helps you to:  Feel stronger and have more energy to do all the things you like to do.  Focus better at school or work.  Feel, think, and sleep better.  Reach and stay at a healthy weight.  Lose fat and build lean muscle.  Lower your risk for serious health problems, including diabetes, heart attack, high blood pressure, and some cancers.  Keep your heart, lungs, bones, muscles, and joints strong and healthy.  How can you make being active part of your life?  Start slowly. Make it your long-term goal to get at least 30 minutes of exercise on most days of the week. Walking is a good choice. You also may want to do other activities, such as running, swimming, cycling, or playing tennis or team sports.  Pick activities that you like--ones that make your heart beat faster, your muscles stronger, and your muscles and joints more flexible. If you find more than one thing you like doing, do them all. You don't have to do the same thing every day.  Get your heart pumping every day. Any activity that makes your heart beat faster and keeps it at that rate for a while counts.  Here are some great ways to get your heart beating faster:  Go for a brisk " "walk, run, or bike ride.  Go for a hike or swim.  Go in-line skating.  Play a game of touch football, basketball, or soccer.  Ride a bike.  Play tennis or racquetball.  Climb stairs.  Even some household chores can be aerobic--just do them at a faster pace. Vacuuming, raking or mowing the lawn, sweeping the garage, and washing and waxing the car all can help get your heart rate up.  Strengthen your muscles during the week. You don't have to lift heavy weights or grow big, bulky muscles to get stronger. Doing a few simple activities that make your muscles work against, or \"resist,\" something can help you get stronger.  For example, you can:  Do push-ups or sit-ups, which use your own body weight as resistance.  Lift weights or dumbbells or use stretch bands at home or in a gym or community center.  Stretch your muscles often. Stretching will help you as you become more active. It can help you stay flexible, loosen tight muscles, and avoid injury. It can also help improve your balance and posture and can be a great way to relax.  Be sure to stretch the muscles you'll be using when you work out. It's best to warm your muscles slightly before you stretch them. Walk or do some other light aerobic activity for a few minutes, and then start stretching.  When you stretch your muscles:  Do it slowly. Stretching is not about going fast or making sudden movements.  Don't push or bounce during a stretch.  Hold each stretch for at least 15 to 30 seconds, if you can. You should feel a stretch in the muscle, but not pain.  Breathe out as you do the stretch. Then breathe in as you hold the stretch. Don't hold your breath.  If you're worried about how more activity might affect your health, have a checkup before you start. Follow any special advice your doctor gives you for getting a smart start.  Where can you learn more?  Go to https://www.healthwise.net/patiented  Enter W332 in the search box to learn more about \"Learning About " "Being Physically Active.\"  Current as of: October 10, 2022               Content Version: 13.7    4982-5133 [employer].   Care instructions adapted under license by your healthcare professional. If you have questions about a medical condition or this instruction, always ask your healthcare professional. [employer] disclaims any warranty or liability for your use of this information.         "

## 2023-09-26 NOTE — PROGRESS NOTES
"SUBJECTIVE:   Kelly is a 73 year old who presents for Preventive Visit.      9/26/2023    11:11 AM   Additional Questions   Roomed by Jenelle ALBERTS   Accompanied by , Miky         9/26/2023    11:11 AM   Patient Reported Additional Medications   Patient reports taking the following new medications none       Are you in the first 12 months of your Medicare coverage?  No    Healthy Habits:     In general, how would you rate your overall health?  Good    Frequency of exercise:  1 day/week    Duration of exercise:  Less than 15 minutes    Do you usually eat at least 4 servings of fruit and vegetables a day, include whole grains    & fiber and avoid regularly eating high fat or \"junk\" foods?  Yes    Taking medications regularly:  Yes    Ability to successfully perform activities of daily living:  No assistance needed    Home Safety:  Lack of grab bars in the bathroom    Hearing Impairment:  No hearing concerns    In the past 6 months, have you been bothered by leaking of urine?  No    In general, how would you rate your overall mental or emotional health?  Excellent    Additional concerns today:  No      Today's PHQ-2 Score:       9/26/2023    11:05 AM   PHQ-2 ( 1999 [employer])   Q1: Little interest or pleasure in doing things 0   Q2: Feeling down, depressed or hopeless 0   PHQ-2 Score 0   Q1: Little interest or pleasure in doing things Not at all   Q2: Feeling down, depressed or hopeless Not at all   PHQ-2 Score 0           Have you ever done Advance Care Planning? (For example, a Health Directive, POLST, or a discussion with a medical provider or your loved ones about your wishes): Yes, advance care planning is on file.       Fall risk  Fallen 2 or more times in the past year?: No  Any fall with injury in the past year?: No  click delete button to remove this line now  Cognitive Screening   1) Repeat 3 items (Leader, Season, Table)    2) Clock draw: NORMAL  3) 3 item recall: Recalls 3 objects  Results: 3 items recalled: " COGNITIVE IMPAIRMENT LESS LIKELY    Mini-CogTM Copyright CELIA Peterson. Licensed by the author for use in [facility]; reprinted with permission (estelle@.[facility]). All rights reserved.      Do you have sleep apnea, excessive snoring or daytime drowsiness? : no    Reviewed and updated as needed this visit by clinical staff   Tobacco  Allergies  Meds              Reviewed and updated as needed this visit by Provider                 Social History     Tobacco Use    Smoking status: Never    Smokeless tobacco: Never   Substance Use Topics    Alcohol use: Never             9/26/2023    11:05 AM   Alcohol Use   Prescreen: >3 drinks/day or >7 drinks/week? Not Applicable     Do you have a current opioid prescription? No  Do you use any other controlled substances or medications that are not prescribed by a provider? None        Current providers sharing in care for this patient include:   Patient Care Team:  Ayleen Castro DO as PCP - General (Internal Medicine)  Ayleen Castro DO as Assigned PCP  Kurt Bennett PA-C as Assigned Sleep Provider  Grecia Rivera MD as Assigned Heart and Vascular Provider    The following health maintenance items are reviewed in Epic and correct as of today:  Health Maintenance   Topic Date Due    COVID-19 Vaccine (6 - Moderna series) 01/09/2023    ANNUAL REVIEW OF HM ORDERS  09/20/2023    MEDICARE ANNUAL WELLNESS VISIT  09/20/2023    MAMMO SCREENING  02/14/2024    FALL RISK ASSESSMENT  09/26/2024    COLORECTAL CANCER SCREENING  03/16/2025    LIPID  07/21/2028    ADVANCE CARE PLANNING  09/26/2028    DTAP/TDAP/TD IMMUNIZATION (3 - Td or Tdap) 09/11/2029    DEXA  08/01/2032    HEPATITIS C SCREENING  Completed    PHQ-2 (once per calendar year)  Completed    INFLUENZA VACCINE  Completed    Pneumococcal Vaccine: 65+ Years  Completed    ZOSTER IMMUNIZATION  Completed    IPV IMMUNIZATION  Aged Out    HPV IMMUNIZATION  Aged Out    MENINGITIS IMMUNIZATION  Aged Out  "    Current Outpatient Medications   Medication Sig Dispense Refill    Ascorbic Acid (VITAMIN C PO) Take 500 mg by mouth daily       ELIQUIS ANTICOAGULANT 5 MG tablet TAKE 1 TABLET BY MOUTH 2 TIMES DAILY 180 tablet 2    losartan-hydrochlorothiazide (HYZAAR) 100-25 MG tablet Take 1 tablet by mouth daily 90 tablet 3    metoprolol succinate ER (TOPROL XL) 25 MG 24 hr tablet Take 1 tablet (25 mg) by mouth 2 times daily 180 tablet 3    rosuvastatin (CRESTOR) 5 MG tablet Take 1 tablet (5 mg) by mouth daily 90 tablet 3    vitamin D3 (CHOLECALCIFEROL) 2000 units (50 mcg) tablet Take 2,000 Units by mouth daily               Review of Systems   Constitutional:  Negative for chills and fever.   HENT:  Negative for congestion, ear pain, hearing loss and sore throat.    Eyes:  Negative for pain and visual disturbance.   Respiratory:  Negative for cough and shortness of breath.    Cardiovascular:  Negative for chest pain, palpitations and peripheral edema.   Gastrointestinal:  Negative for abdominal pain, constipation, diarrhea, heartburn, hematochezia and nausea.   Breasts:  Negative for tenderness, breast mass and discharge.   Genitourinary:  Negative for dysuria, frequency, genital sores, hematuria, pelvic pain, urgency, vaginal bleeding and vaginal discharge.   Musculoskeletal:  Positive for arthralgias and joint swelling. Negative for myalgias.   Skin:  Negative for rash.   Neurological:  Negative for dizziness, weakness, headaches and paresthesias.   Psychiatric/Behavioral:  Negative for mood changes. The patient is not nervous/anxious.          OBJECTIVE:   BP (!) 144/74 (BP Location: Right arm, Patient Position: Chair, Cuff Size: Adult Large)   Pulse 63   Temp 98.6  F (37  C) (Tympanic)   Resp 20   Ht 1.651 m (5' 5\")   Wt 100.6 kg (221 lb 11.2 oz)   SpO2 99%   BMI 36.89 kg/m   Estimated body mass index is 36.89 kg/m  as calculated from the following:    Height as of this encounter: 1.651 m (5' 5\").    Weight as of " this encounter: 100.6 kg (221 lb 11.2 oz).  Physical Exam  GENERAL: healthy, alert and no distress  EYES: Eyes grossly normal to inspection, PERRL and conjunctivae and sclerae normal  HENT: ear canals and TM's normal, nose and mouth without ulcers or lesions  NECK: no adenopathy, no asymmetry, masses, or scars and thyroid normal to palpation  RESP: lungs clear to auscultation - no rales, rhonchi or wheezes  CV: regular rate and rhythm, normal S1 S2, no S3 or S4, no murmur, click or rub, no peripheral edema and peripheral pulses strong  ABDOMEN: soft, nontender, no hepatosplenomegaly, no masses and bowel sounds normal  MS: no gross musculoskeletal defects noted, no edema  SKIN: no suspicious lesions or rashes  NEURO: Normal strength and tone, mentation intact and speech normal  PSYCH: mentation appears normal, affect normal/bright    Diagnostic Test Results:  Labs reviewed in Epic    ASSESSMENT / PLAN:       ICD-10-CM    1. Encounter for Medicare annual wellness exam  Z00.00       2. Essential hypertension  I10 losartan-hydrochlorothiazide (HYZAAR) 100-25 MG tablet     Basic metabolic panel     OFFICE/OUTPT VISIT,EST,LEVL IV      3. Hyperlipidemia LDL goal <130  E78.5 rosuvastatin (CRESTOR) 5 MG tablet     Lipid panel reflex to direct LDL Fasting     OFFICE/OUTPT VISIT,EST,LEVL IV      4. Morbid obesity (H)  E66.01 OFFICE/OUTPT VISIT,EST,LEVL IV      5. Paroxysmal atrial fibrillation (H)  I48.0 CBC with platelets     OFFICE/OUTPT VISIT,EST,LEVL IV      6. Visit for screening mammogram  Z12.31 MA Screen Bilateral w/Raoul          Patient has been advised of split billing requirements and indicates understanding: Yes      COUNSELING:  Reviewed preventive health counseling, as reflected in patient instructions        She reports that she has never smoked. She has never used smokeless tobacco.      Appropriate preventive services were discussed with this patient, including applicable screening as appropriate for  cardiovascular disease, diabetes, osteopenia/osteoporosis, and glaucoma.  As appropriate for age/gender, discussed screening for colorectal cancer, prostate cancer, breast cancer, and cervical cancer. Checklist reviewing preventive services available has been given to the patient.    Reviewed patients plan of care and provided an AVS. The Complex Care Plan (for patients with higher acuity and needing more deliberate coordination of services) for Kelly meets the Care Plan requirement. This Care Plan has been established and reviewed with the Patient.          Ayleen Castro [facility]    Identified Health Risks:  I have reviewed Opioid Use Disorder and Substance Use Disorder risk factors and made any needed referrals.   She is at risk for lack of exercise and has been provided with information to increase physical activity for the benefit of her well-being.She is at risk for lack of exercise and has been provided with information to increase physical activity for the benefit of her well-being.

## 2023-12-18 ENCOUNTER — PATIENT OUTREACH (OUTPATIENT)
Dept: GASTROENTEROLOGY | Facility: CLINIC | Age: 73
End: 2023-12-18
Payer: COMMERCIAL

## 2023-12-19 ENCOUNTER — TELEPHONE (OUTPATIENT)
Dept: FAMILY MEDICINE | Facility: CLINIC | Age: 73
End: 2023-12-19
Payer: COMMERCIAL

## 2023-12-19 DIAGNOSIS — Z12.11 SCREEN FOR COLON CANCER: Primary | ICD-10-CM

## 2023-12-20 NOTE — TELEPHONE ENCOUNTER
Patient Quality Outreach    Patient is due for the following:   Colon Cancer Screening    Next Steps:   Due for FIT     Type of outreach:    Phone, spoke to patient/parent. Will leave FIT at  in envelope for patient       Questions for provider review:    None           Shady Ramsey, CMA

## 2024-01-02 ENCOUNTER — LAB (OUTPATIENT)
Dept: LAB | Facility: CLINIC | Age: 74
End: 2024-01-02
Payer: COMMERCIAL

## 2024-01-02 DIAGNOSIS — Z12.11 SCREEN FOR COLON CANCER: ICD-10-CM

## 2024-01-02 NOTE — LETTER
January 10, 2024      Kelly Mckeon  [id number] [address] [facility] [address] [facility] [id number]        Dear ,    We are writing to inform you of your test results.    Negative for colon cancer, repeat test in 1 year.    Resulted Orders   Fecal colorectal cancer screen (FIT)   Result Value Ref Range    Occult Blood Screen FIT Negative Negative       If you have any questions or concerns, please call the clinic at the number listed above.       Sincerely,      Ayleen Castro, DO            
no

## 2024-01-04 LAB — HEMOCCULT STL QL IA: NEGATIVE

## 2024-01-04 PROCEDURE — 82274 ASSAY TEST FOR BLOOD FECAL: CPT

## 2024-01-30 ENCOUNTER — PATIENT OUTREACH (OUTPATIENT)
Dept: CARE COORDINATION | Facility: CLINIC | Age: 74
End: 2024-01-30
Payer: COMMERCIAL

## 2024-03-12 ENCOUNTER — TELEPHONE (OUTPATIENT)
Dept: CARDIOLOGY | Facility: CLINIC | Age: 74
End: 2024-03-12
Payer: COMMERCIAL

## 2024-03-12 DIAGNOSIS — I48.0 PAROXYSMAL ATRIAL FIBRILLATION (H): Primary | ICD-10-CM

## 2024-03-12 NOTE — TELEPHONE ENCOUNTER
Pt called EP RN office to discuss her AF she is currently experiencing.     She reports her AF episodes typically do not last more than a few hours and do no occur often. She is currently going on 12 hours in AF. Confirmed she is not experiencing any symptoms associated with her AF and taking Eliquis as prescribed along with Metoprolol 25mg BID with no missed doses. No illness present and she does not feel very concerned, but wanted to check in due to her episode lasting longer than normal. She is OK waiting for any changes until she establishes care with Karma Dawson in April or discuss with Dr. Rivera if needed.    Pt was last seen in EP clinic by Wolf Chinchilla 7/14/22.     Christina Jacobs RN

## 2024-03-12 NOTE — TELEPHONE ENCOUNTER
Please have her come in for EKG; plan for DCCV if still in AF and compliant with OAC.  If paroxysmal, would recommend proceeding with MPI for class Ic candidacy as her sinus rates will likely not accommodate beta-blocker.  Follow-up as previously scheduled. Thank you

## 2024-03-12 NOTE — TELEPHONE ENCOUNTER
Noted.  Phone call to patient.  Reviewed the below recommendations from Karma Dawson.  The patient states she is feeling fine, her heart rates are about 100.  She is agreeable to EKG in the next few days to check on her afib status, will discuss plans further at that time. She will cancel if she goes back to [facility] on her own.  Orders for EKG placed and will ask scheduling to contact her for a time.

## 2024-04-13 DIAGNOSIS — I48.0 PAROXYSMAL ATRIAL FIBRILLATION (H): ICD-10-CM

## 2024-04-14 ENCOUNTER — HEALTH MAINTENANCE LETTER (OUTPATIENT)
Age: 74
End: 2024-04-14

## 2024-04-15 RX ORDER — APIXABAN 5 MG/1
TABLET, FILM COATED ORAL
Qty: 180 TABLET | Refills: 1 | Status: SHIPPED | OUTPATIENT
Start: 2024-04-15

## 2024-04-23 ENCOUNTER — OFFICE VISIT (OUTPATIENT)
Dept: CARDIOLOGY | Facility: CLINIC | Age: 74
End: 2024-04-23
Payer: COMMERCIAL

## 2024-04-23 VITALS
RESPIRATION RATE: 20 BRPM | DIASTOLIC BLOOD PRESSURE: 88 MMHG | WEIGHT: 223.3 LBS | SYSTOLIC BLOOD PRESSURE: 136 MMHG | HEART RATE: 67 BPM | OXYGEN SATURATION: 97 % | BODY MASS INDEX: 37.16 KG/M2

## 2024-04-23 DIAGNOSIS — I10 ESSENTIAL HYPERTENSION: ICD-10-CM

## 2024-04-23 DIAGNOSIS — I48.0 PAROXYSMAL ATRIAL FIBRILLATION (H): Primary | ICD-10-CM

## 2024-04-23 DIAGNOSIS — G47.33 OSA (OBSTRUCTIVE SLEEP APNEA): ICD-10-CM

## 2024-04-23 PROCEDURE — G2211 COMPLEX E/M VISIT ADD ON: HCPCS | Performed by: NURSE PRACTITIONER

## 2024-04-23 PROCEDURE — 99214 OFFICE O/P EST MOD 30 MIN: CPT | Performed by: NURSE PRACTITIONER

## 2024-04-23 NOTE — LETTER
4/23/2024    Ayleen Castro, DO  [address] [facility] [address] [address]    RE: Kelly Mckeon       Dear Colleague,     I had the [facility].     [facility]  Cardiac Electrophysiology  [address]  [place] [address]   Office: [phone]  Fax: [phone]     HEART CARE ELECTROPHYSIOLOGY FOLLOW UP    Primary Care: Ayleen Castro MD      Assessment/Recommendations     Paroxysmal atrial fibrillation: Diagnosed 2021, symptomatic with tachypalpitations and fatigue. Episodes occur once every few months, longest to date up to 24 hours  We discussed the pathophysiology and natural progression of atrial fibrillation, management including stroke risk reduction, rate control, antiarrhythmic drug therapy and consideration of catheter ablation. We also discussed the ongoing importance of lifestyle modification including maintaining a healthy weight, sleep apnea diagnosis and management, and alcohol avoidance, as part of a long term strategy for atrial fibrillation management.  She would prefer to continue expectant management though is tentatively interested in ablation.  Her  is on flecainide and she inquires regarding pill in pocket therapy.  Discussed stress testing prior to consideration of class Ic AAD which she will consider further. For now continue metoprolol     [id number] score of 3 for age 65-74, gender, HTN    HTN: controlled on current regimen    VIOLET: mild, untreated. We discussed the correlation between untreated sleep apnea, atrial arrhythmias and overall cardiovascular health    Plan:   -Continue Eliquis 5 mg twice a day for stroke prophylaxis  -Continue metoprolol succinate 25 mg twice a day  -EP MD follow-up 3-6 months to discuss ablation     History of Present Illness/Subjective    Kelly Mckeon is a 73 year old female with past medical history significant for paroxysmal AF,  HTN, dyslipidemia, VIOLET, obesity.  She underwent workup for self-limiting and intermittent palpitations and was diagnosed with paroxysmal AF by [facility] in 2021.  Symptoms were initially well-controlled on metoprolol.  She notes intermittent and self-limiting tachypalpitations and fatigue occurring once every few months, most recently in January, lasting up to 24 hours. She has no chest discomfort, dyspnea, lightheadedness/dizziness during these episodes.  She denies pedal edema or syncope.  She is seen today with her  who recently had an ablation.      Data Review     Arrhythmia hx  Dx/date: Paroxysmal AF 2021 by MCT  Sx: Tachypalpitations  [id number]: 3-age 65-74, gender, HTN; apixaban  Rate control: Metoprolol  AAD: None  DCCV: None  Ablation: None    EKG 2/2/2021: SR 67 bpm  Personally reviewed.     TTE 2021  1.Left ventricular size, wall motion and function are normal. The ejection  fraction is 60-65%, visualy hyperdynamic.  2.TAPSE is normal, which is consistent with normal right ventricular systolic  function.  3.No hemodynamically significant valvular abnormalities on 2D or color flow  imaging.  4.There is no comparison study available.    MCT x 25 days beginning 9/10/2021.  Predominant rhythm sinus average 70 bpm.  Paroxysmal AF present, single episode lasting 2.5 hours, average 97 bpm, range  bpm.  1 episode of SVT x 12 beats up to 167 bpm.  Symptom triggers correlated to AF, sinus rhythm with isolated PAC    I have reviewed and updated the patient's past medical history, allergy list and medication list.          Physical Examination   Vitals: There were no vitals taken for this visit.    BMI= There is no height or weight on file to calculate BMI.    Wt Readings from Last 3 Encounters:   09/26/23 100.6 kg (221 lb 11.2 oz)   02/15/23 100.2 kg (221 lb)   09/20/22 98.9 kg (218 lb)       General   Appearance:   Alert and oriented, in no acute distress.    HEENT:  Normocephalic and atraumatic.  Conjunctiva and sclera are clear. Moist oral mucosa.    Neck: No JVP, carotid bruit or obvious thyromegaly.   Lungs:   Respirations unlabored. Clear bilaterally with no rales, rhonchi, or wheezes.     Cardiovascular:   Rhythm is regular. S1 and S2 are normal. No significant murmur is present. Lower extremities demonstrate no significant edema. Posterior tibial pulses are intact bilaterally.   Extremities: No cyanosis or clubbing   Skin: Skin is warm, dry, and otherwise intact.   Neurologic: Gait not assessed. Mood and affect appropriate.           Medical History  Surgical History Family History Social History   No past medical history on file. Past Surgical History:   Procedure Laterality Date    AS REPAIR/GRAFT ACHILLES TENDON Right 2011    benign ovarian tumor removal  1999    wisdom teeth      ZZC MOLE REMOVAL (LESIONS) TIER 1      Family History   Problem Relation Age of Onset    Heart Failure Mother     Emphysema Mother     Melanoma Mother     No Known Problems Father     Social History     Socioeconomic History    Marital status:      Spouse name: Not on file    Number of children: Not on file    Years of education: Not on file    Highest education level: Not on file   Occupational History    Not on file   Tobacco Use    Smoking status: Never    Smokeless tobacco: Never   Vaping Use    Vaping status: Never Used   Substance and Sexual Activity    Alcohol use: Never    Drug use: Never    Sexual activity: Yes     Partners: Male   Other Topics Concern    Not on file   Social History Narrative    Not on file     Social Determinants of Health     Financial Resource Strain: Low Risk  (9/26/2023)    Financial Resource Strain     Within the past 12 months, have you or your family members you live with been unable to get utilities (heat, electricity) when it was really needed?: No   Food Insecurity: Low Risk  (9/26/2023)    Food Insecurity     Within the past 12 months, did you worry that your food would run out  before you got money to buy more?: No     Within the past 12 months, did the food you bought just not last and you didn t have money to get more?: No   Transportation Needs: Low Risk  (9/26/2023)    Transportation Needs     Within the past 12 months, has lack of transportation kept you from medical appointments, getting your medicines, non-medical meetings or appointments, work, or from getting things that you need?: No   Physical Activity: Not on file   Stress: Not on file   Social Connections: Unknown (1/1/2022)    Received from [facility]    Social Connections     Frequency of Communication with Friends and Family: Not on file   Interpersonal Safety: Not on file   Housing Stability: Low Risk  (9/26/2023)    Housing Stability     Do you have housing? : Yes     Are you worried about losing your housing?: No          Medications  Allergies   Scheduled Meds:  Current Outpatient Medications   Medication Sig Dispense Refill    Ascorbic Acid (VITAMIN C PO) Take 500 mg by mouth daily       ELIQUIS ANTICOAGULANT 5 MG tablet TAKE 1 TABLET BY MOUTH 2 TIMES DAILY 180 tablet 1    losartan-hydrochlorothiazide (HYZAAR) 100-25 MG tablet Take 1 tablet by mouth daily 90 tablet 3    metoprolol succinate ER (TOPROL XL) 25 MG 24 hr tablet Take 1 tablet (25 mg) by mouth 2 times daily 180 tablet 3    rosuvastatin (CRESTOR) 5 MG tablet Take 1 tablet (5 mg) by mouth daily 90 tablet 3    vitamin D3 (CHOLECALCIFEROL) 2000 units (50 mcg) tablet Take 2,000 Units by mouth daily      Allergies   Allergen Reactions    Lisinopril Cough    Perflutren Lipid Microsphere     Perflutren Lipid Microsphere [Perflutren Lipid Microspheres]      Severe back pain    Seasonal Allergies          Lab Results    Chemistry/lipid CBC Cardiac Enzymes/BNP/TSH/INR   Lab Results   Component Value Date    CHOL 190 07/21/2023    HDL 77 07/21/2023    TRIG 96 07/21/2023    BUN 16.2 07/21/2023     07/21/2023    CO2 26 07/21/2023     Lab Results   Component Value Date    WBC 5.9 2023    HGB 13.1 2023    HCT 39.2 2023    MCV 92 2023     2023    @RESUFAST(BMP,CBC,BNP,TSH,  INR)@      31 minutes spent reviewing prior records (including documentation, laboratory studies, cardiac testing/imaging), history and physical exam, planning, and subsequent documentation.     The longitudinal plan of care for the diagnosis(es)/condition(s) as documented were addressed during this visit. Due to the added complexity in care, I will continue to support Kelly in the subsequent management and with ongoing continuity of care.      This note has been dictated using voice recognition software. Any grammatical, typographical, or context distortions are unintentional and inherent to the software.    Karma Dawson CNP  Clinical Cardiac Electrophysiology  [facility]  Clinic and [phone]  Fax: [phone]  Electrophysiology Nurses: [phone]          Thank you for allowing me to participate in the care of your patient.      Sincerely,     ASHOK MCKEON CNP     [facility]  cc:   Ayleen Castro, DO  [address] [place] [address]

## 2024-04-23 NOTE — PROGRESS NOTES
[facility]  Cardiac Electrophysiology  [address]  [place] [address]   Office: [phone]  Fax: [phone]     HEART CARE ELECTROPHYSIOLOGY FOLLOW UP    Primary Care: Ayleen Castro MD      Assessment/Recommendations     Paroxysmal atrial fibrillation: Diagnosed 2021, symptomatic with tachypalpitations and fatigue. Episodes occur once every few months, longest to date up to 24 hours  We discussed the pathophysiology and natural progression of atrial fibrillation, management including stroke risk reduction, rate control, antiarrhythmic drug therapy and consideration of catheter ablation. We also discussed the ongoing importance of lifestyle modification including maintaining a healthy weight, sleep apnea diagnosis and management, and alcohol avoidance, as part of a long term strategy for atrial fibrillation management.  She would prefer to continue expectant management though is tentatively interested in ablation.  Her  is on flecainide and she inquires regarding pill in pocket therapy.  Discussed stress testing prior to consideration of class 1c AAD which she will consider further. For now continue metoprolol     [id number] score of 3 for age 65-74, gender, HTN    HTN: controlled on current regimen    VIOLET: mild, untreated. We discussed the correlation between untreated sleep apnea, atrial arrhythmias and overall cardiovascular health    Plan:   -Continue Eliquis 5 mg twice a day for stroke prophylaxis  -Continue metoprolol succinate 25 mg twice a day  -EP MD follow-up 3-6 months to discuss ablation     History of Present Illness/Subjective    Kelly Mckeon is a 73 year old female with past medical history significant for paroxysmal AF, HTN, dyslipidemia, VIOLET, obesity.  She underwent workup for self-limiting and intermittent palpitations and was diagnosed with paroxysmal AF by MCT in 2021.  Symptoms were initially well-controlled on metoprolol.   She notes intermittent self-limiting tachypalpitations and fatigue occurring once every few months, most recently in January, lasting up to 24 hours. She has no chest discomfort, dyspnea, lightheadedness/dizziness during these episodes.  She denies pedal edema or syncope.  She is seen today with her  who recently had an ablation.      Data Review     Arrhythmia hx  Dx/date: Paroxysmal AF 2021 by MCT  Sx: Tachypalpitations  [id number]: 3-age 65-74, gender, HTN; apixaban  Rate control: Metoprolol  AAD: None  DCCV: None  Ablation: None    EKG 2/2/2021: SR 67 bpm  Personally reviewed.     TTE 2021  1.Left ventricular size, wall motion and function are normal. The ejection  fraction is 60-65%, visualy hyperdynamic.  2.TAPSE is normal, which is consistent with normal right ventricular systolic  function.  3.No hemodynamically significant valvular abnormalities on 2D or color flow  imaging.  4.There is no comparison study available.    MCT x 25 days beginning 9/10/2021.  Predominant rhythm sinus average 70 bpm.  Paroxysmal AF present, single episode lasting 2.5 hours, average 97 bpm, range  bpm.  1 episode of SVT x 12 beats up to 167 bpm.  Symptom triggers correlated to AF, sinus rhythm with isolated PAC    I have reviewed and updated the patient's past medical history, allergy list and medication list.          Physical Examination   Vitals: There were no vitals taken for this visit.    BMI= There is no height or weight on file to calculate BMI.    Wt Readings from Last 3 Encounters:   09/26/23 100.6 kg (221 lb 11.2 oz)   02/15/23 100.2 kg (221 lb)   09/20/22 98.9 kg (218 lb)       General   Appearance:   Alert and oriented, in no acute distress.    HEENT:  Normocephalic and atraumatic. Conjunctiva and sclera are clear. Moist oral mucosa.    Neck: No JVP, carotid bruit or obvious thyromegaly.   Lungs:   Respirations unlabored. Clear bilaterally with no rales, rhonchi, or wheezes.     Cardiovascular:    Rhythm is regular. S1 and S2 are normal. No significant murmur is present. Lower extremities demonstrate no significant edema. Posterior tibial pulses are intact bilaterally.   Extremities: No cyanosis or clubbing   Skin: Skin is warm, dry, and otherwise intact.   Neurologic: Gait not assessed. Mood and affect appropriate.           Medical History  Surgical History Family History Social History   No past medical history on file. Past Surgical History:   Procedure Laterality Date    AS REPAIR/GRAFT ACHILLES TENDON Right 2011    benign ovarian tumor removal  1999    wisdom teeth      ZZC MOLE REMOVAL (LESIONS) TIER 1      Family History   Problem Relation Age of Onset    Heart Failure Mother     Emphysema Mother     Melanoma Mother     No Known Problems Father     Social History     Socioeconomic History    Marital status:      Spouse name: Not on file    Number of children: Not on file    Years of education: Not on file    Highest education level: Not on file   Occupational History    Not on file   Tobacco Use    Smoking status: Never    Smokeless tobacco: Never   Vaping Use    Vaping status: Never Used   Substance and Sexual Activity    Alcohol use: Never    Drug use: Never    Sexual activity: Yes     Partners: Male   Other Topics Concern    Not on file   Social History Narrative    Not on file     Social Determinants of Health     Financial Resource Strain: Low Risk  (9/26/2023)    Financial Resource Strain     Within the past 12 months, have you or your family members you live with been unable to get utilities (heat, electricity) when it was really needed?: No   Food Insecurity: Low Risk  (9/26/2023)    Food Insecurity     Within the past 12 months, did you worry that your food would run out before you got money to buy more?: No     Within the past 12 months, did the food you bought just not last and you didn t have money to get more?: No   Transportation Needs: Low Risk  (9/26/2023)    Transportation  Needs     Within the past 12 months, has lack of transportation kept you from medical appointments, getting your medicines, non-medical meetings or appointments, work, or from getting things that you need?: No   Physical Activity: Not on file   Stress: Not on file   Social Connections: Unknown (1/1/2022)    Received from [facility]    Social Connections     Frequency of Communication with Friends and Family: Not on file   Interpersonal Safety: Not on file   Housing Stability: Low Risk  (9/26/2023)    Housing Stability     Do you have housing? : Yes     Are you worried about losing your housing?: No          Medications  Allergies   Scheduled Meds:  Current Outpatient Medications   Medication Sig Dispense Refill    Ascorbic Acid (VITAMIN C PO) Take 500 mg by mouth daily       ELIQUIS ANTICOAGULANT 5 MG tablet TAKE 1 TABLET BY MOUTH 2 TIMES DAILY 180 tablet 1    losartan-hydrochlorothiazide (HYZAAR) 100-25 MG tablet Take 1 tablet by mouth daily 90 tablet 3    metoprolol succinate ER (TOPROL XL) 25 MG 24 hr tablet Take 1 tablet (25 mg) by mouth 2 times daily 180 tablet 3    rosuvastatin (CRESTOR) 5 MG tablet Take 1 tablet (5 mg) by mouth daily 90 tablet 3    vitamin D3 (CHOLECALCIFEROL) 2000 units (50 mcg) tablet Take 2,000 Units by mouth daily      Allergies   Allergen Reactions    Lisinopril Cough    Perflutren Lipid Microsphere     Perflutren Lipid Microsphere [Perflutren Lipid Microspheres]      Severe back pain    Seasonal Allergies          Lab Results    Chemistry/lipid CBC Cardiac Enzymes/BNP/TSH/INR   Lab Results   Component Value Date    CHOL 190 07/21/2023    HDL 77 07/21/2023    TRIG 96 07/21/2023    BUN 16.2 07/21/2023     07/21/2023    CO2 26 07/21/2023    Lab Results   Component Value Date    WBC 5.9 07/21/2023    HGB 13.1 07/21/2023    HCT 39.2 07/21/2023    MCV 92 07/21/2023     07/21/2023    @RESUFAST(BMP,CBC,BNP,TSH,  INR)@      31 minutes spent  reviewing prior records (including documentation, laboratory studies, cardiac testing/imaging), history and physical exam, planning, and subsequent documentation.     The longitudinal plan of care for the diagnosis(es)/condition(s) as documented were addressed during this visit. Due to the added complexity in care, I will continue to support Kelly in the subsequent management and with ongoing continuity of care.      This note has been dictated using voice recognition software. Any grammatical, typographical, or context distortions are unintentional and inherent to the software.    Karma Dawson, CNP  Clinical Cardiac Electrophysiology  [facility]  Clinic and [phone]  Fax: [phone]  Electrophysiology Nurses: [phone]

## 2024-05-04 DIAGNOSIS — I48.0 PAROXYSMAL ATRIAL FIBRILLATION (H): ICD-10-CM

## 2024-05-06 RX ORDER — METOPROLOL SUCCINATE 25 MG/1
25 TABLET, EXTENDED RELEASE ORAL 2 TIMES DAILY
Qty: 180 TABLET | Refills: 1 | Status: SHIPPED | OUTPATIENT
Start: 2024-05-06 | End: 2024-10-02

## 2024-05-13 ENCOUNTER — OFFICE VISIT (OUTPATIENT)
Dept: CARDIOLOGY | Facility: CLINIC | Age: 74
End: 2024-05-13
Payer: COMMERCIAL

## 2024-05-13 VITALS
HEIGHT: 66 IN | SYSTOLIC BLOOD PRESSURE: 124 MMHG | HEART RATE: 60 BPM | DIASTOLIC BLOOD PRESSURE: 82 MMHG | BODY MASS INDEX: 35.76 KG/M2 | WEIGHT: 222.5 LBS | RESPIRATION RATE: 16 BRPM

## 2024-05-13 DIAGNOSIS — I48.0 PAROXYSMAL ATRIAL FIBRILLATION (H): Primary | ICD-10-CM

## 2024-05-13 DIAGNOSIS — I10 HYPERTENSION, UNSPECIFIED TYPE: ICD-10-CM

## 2024-05-13 DIAGNOSIS — G47.33 OSA (OBSTRUCTIVE SLEEP APNEA): ICD-10-CM

## 2024-05-13 PROCEDURE — 99214 OFFICE O/P EST MOD 30 MIN: CPT | Performed by: INTERNAL MEDICINE

## 2024-05-13 NOTE — PROGRESS NOTES
[facility] [address], [place] [address]   [email or web address]   OFFICE: [phone]            Impression and Plan     1.  Atrial fibrillation (paroxysmal).  She has been maintained on apixaban for CVA prophylaxis.  She was recently seen by Karma PULIDO CNP who documents that arrangements were made for Kelly to follow-up with one of our Electrophysiologists to discuss possible ablation.  Plan to continue apixaban.  Continue current dose of metoprolol.  Consultation with Dr. Bret Coulter as already arranged by Karma Dawson  in July 2024.     2.  Hypertension.  Pressure is reasonable in the office today at 124/82 mmHg.     3.  Obstructive sleep apnea.  Kelly was diagnosed with mild obstructive sleep apnea on polysomnogram a couple of years ago.  She was offered CPAP at that time though had opted to defer CPAP therapy.  We discussed this again today and how sleep apnea can be a trigger for atrial fibrillation.  She states that she would be amenable to visiting with Sleep Medicine once again to discuss further.  Will make referral to Sleep Medicine.     Plan on follow-up in 1 year.    35 minutes spent reviewing prior records (including documentation, laboratory studies, cardiac testing/imaging), interview with patient along with physical exam, planning, and subsequent documentation/crafting of note).           History of Present Illness    Once again I would like to thank you again for asking me to participate in the care of your patient, Kelly Mckeon.  As you know, but to reiterate for my own records, Kelly Mckeon is a 73 year old female with history of paroxysmal atrial fibrillation.     On interview, Kelly does state that since my last visit with her she has had some mild increase in frequency of atrial fibrillation.  She subjectively has an episode every 1 to 2 months.  Generally these are short-lived though she did have a  "6-hour episode.    She recently met with Karma PULIDO CNP in the Atrial Fibrillation Clinic.  At that time possible ablation was broached and she did have some initial interest in considering this.  We discussed this some more today and she already has a visit with Dr. Coulter in 1 to 2 months.      Further review of systems is otherwise negative/noncontributory (medical record and 13 point review of systems reviewed as well and pertinent positives noted).         Cardiac Diagnostics        Echocardiogram 10 September 2021:  Normal left ventricular size and systolic performance with ejection fraction of 60 to 65%.  No significant valvular heart disease.  Normal right ventricular size and systolic performance.  Normal atrial dimensions.    Ambulatory monitor x25 days September/October 2021:  Predominantly sinus rhythm.  Paroxysmal atrial fibrillation - single episode lasting 2hrs 26min associated with borderline high average ventricular rate.  Symptom triggers (3) correlated to atrial fibrillation, sinus rhythm with isolated PAC.            Physical Examination       /82 (BP Location: Right arm, Patient Position: Sitting, Cuff Size: Adult Large)   Pulse 60   Resp 16   Ht 1.676 m (5' 6\")   Wt 100.9 kg (222 lb 8 oz)   BMI 35.91 kg/m          Wt Readings from Last 3 Encounters:   05/13/24 100.9 kg (222 lb 8 oz)   04/23/24 101.3 kg (223 lb 4.8 oz)   09/26/23 100.6 kg (221 lb 11.2 oz)     The patient is alert and oriented times three. Sclerae are anicteric. Mucosal membranes are moist. Jugular venous pressure is normal. No significant adenopathy/thyromegally appreciated. Lungs are clear with good expansion. On cardiovascular exam, the patient has a regular S1 and S2.  Heart sounds are somewhat distant.  Abdomen is soft and non-tender. Extremities reveal no clubbing, cyanosis, or edema.         Family History/Social History/Risk Factors   Patient does not smoke.  Family history reviewed, and family " history includes Emphysema in her mother; Heart Failure in her mother; Melanoma in her mother; No Known Problems in her father.          Medical History  Surgical History Family History Social History   Obstructive sleep apnea  Paroxysmal atrial fibrillation Past Surgical History:   Procedure Laterality Date    AS REPAIR/GRAFT ACHILLES TENDON Right 2011    benign ovarian tumor removal  1999    wisdom teeth      ZZC MOLE REMOVAL (LESIONS) TIER 1       Family History   Problem Relation Age of Onset    Heart Failure Mother     Emphysema Mother     Melanoma Mother     No Known Problems Father         Social History     Socioeconomic History    Marital status:      Spouse name: Not on file    Number of children: Not on file    Years of education: Not on file    Highest education level: Not on file   Occupational History    Not on file   Tobacco Use    Smoking status: Never    Smokeless tobacco: Never   Vaping Use    Vaping status: Never Used   Substance and Sexual Activity    Alcohol use: Never    Drug use: Never    Sexual activity: Yes     Partners: Male   Other Topics Concern    Not on file   Social History Narrative    Not on file     Social Determinants of Health     Financial Resource Strain: Low Risk  (9/26/2023)    Financial Resource Strain     Within the past 12 months, have you or your family members you live with been unable to get utilities (heat, electricity) when it was really needed?: No   Food Insecurity: Low Risk  (9/26/2023)    Food Insecurity     Within the past 12 months, did you worry that your food would run out before you got money to buy more?: No     Within the past 12 months, did the food you bought just not last and you didn t have money to get more?: No   Transportation Needs: Low Risk  (9/26/2023)    Transportation Needs     Within the past 12 months, has lack of transportation kept you from medical appointments, getting your medicines, non-medical meetings or appointments, work, or  "from getting things that you need?: No   Physical Activity: Not on file   Stress: Not on file   Social Connections: Unknown (1/1/2022)    Received from "[employer] [facility], "[employer] [facility] [employer] [facility]    Social Connections     Frequency of Communication with Friends and Family: Not on file   Interpersonal Safety: Not on file   Housing Stability: Low Risk  (9/26/2023)    Housing Stability     Do you have housing? : Yes     Are you worried about losing your housing?: No           Medications  Allergies   Current Outpatient Medications   Medication Sig Dispense Refill    Ascorbic Acid (VITAMIN C PO) Take 500 mg by mouth daily       ELIQUIS ANTICOAGULANT 5 MG tablet TAKE 1 TABLET BY MOUTH 2 TIMES DAILY 180 tablet 1    losartan-hydrochlorothiazide (HYZAAR) 100-25 MG tablet Take 1 tablet by mouth daily 90 tablet 3    metoprolol succinate ER (TOPROL XL) 25 MG 24 hr tablet TAKE 1 TABLET BY MOUTH 2 TIMES DAILY 180 tablet 1    rosuvastatin (CRESTOR) 5 MG tablet Take 1 tablet (5 mg) by mouth daily 90 tablet 3    vitamin D3 (CHOLECALCIFEROL) 2000 units (50 mcg) tablet Take 2,000 Units by mouth daily         Allergies   Allergen Reactions    Lisinopril Cough    Perflutren Lipid Microsphere     Perflutren Lipid Microsphere [Perflutren Lipid Microspheres]      Severe back pain    Seasonal Allergies           Lab Results    Chemistry/lipid CBC Cardiac Enzymes/BNP/TSH/INR   Recent Labs   Lab Test 07/21/23  0840   CHOL 190   HDL 77   LDL 94   TRIG 96     Recent Labs   Lab Test 07/21/23  0840 09/15/22  0930 09/16/21  1107   LDL 94 85 73     Recent Labs   Lab Test 07/21/23  0840      POTASSIUM 3.7   CHLORIDE 106   CO2 26   GLC 93   BUN 16.2   CR 0.89   GFRESTIMATED 69   CONSTANTIN 9.3     Recent Labs   Lab Test 07/21/23  0840 09/15/22  0930 09/16/21  1107   CR 0.89 0.98* 0.81     No results for input(s): \"A1C\" in the last 37994 hours.       Recent Labs   Lab Test 07/21/23  0840   WBC 5.9   HGB " "13.1   HCT 39.2   MCV 92        Recent Labs   Lab Test 07/21/23  0840 09/15/22  0930 02/02/21  1628   HGB 13.1 13.2 13.5    No results for input(s): \"TROPONINI\" in the last 29255 hours.  No results for input(s): \"BNP\", \"NTBNPI\", \"NTBNP\" in the last 59811 hours.  Recent Labs   Lab Test 02/02/21  1628   TSH 0.76     No results for input(s): \"INR\" in the last 67308 hours.       Medications  Allergies   Current Outpatient Medications   Medication Sig Dispense Refill    Ascorbic Acid (VITAMIN C PO) Take 500 mg by mouth daily       ELIQUIS ANTICOAGULANT 5 MG tablet TAKE 1 TABLET BY MOUTH 2 TIMES DAILY 180 tablet 1    losartan-hydrochlorothiazide (HYZAAR) 100-25 MG tablet Take 1 tablet by mouth daily 90 tablet 3    metoprolol succinate ER (TOPROL XL) 25 MG 24 hr tablet TAKE 1 TABLET BY MOUTH 2 TIMES DAILY 180 tablet 1    rosuvastatin (CRESTOR) 5 MG tablet Take 1 tablet (5 mg) by mouth daily 90 tablet 3    vitamin D3 (CHOLECALCIFEROL) 2000 units (50 mcg) tablet Take 2,000 Units by mouth daily        Allergies   Allergen Reactions    Lisinopril Cough    Perflutren Lipid Microsphere     Perflutren Lipid Microsphere [Perflutren Lipid Microspheres]      Severe back pain    Seasonal Allergies           Lab Results   Lab Results   Component Value Date     07/21/2023     02/02/2021    CO2 26 07/21/2023    CO2 27 09/16/2021    CO2 29 02/02/2021    BUN 16.2 07/21/2023    BUN 14 09/16/2021    BUN 18 02/02/2021     Lab Results   Component Value Date    WBC 5.9 07/21/2023    WBC 6.9 02/02/2021    HGB 13.1 07/21/2023    HGB 13.5 02/02/2021    HCT 39.2 07/21/2023    HCT 40.4 02/02/2021    MCV 92 07/21/2023    MCV 91 02/02/2021     07/21/2023     02/02/2021     Lab Results   Component Value Date    CHOL 190 07/21/2023    CHOL 175 09/06/2020    TRIG 96 07/21/2023    TRIG 74 09/06/2020    HDL 77 07/21/2023    HDL 75 09/06/2020     Lab Results   Component Value Date    TSH 0.76 02/02/2021                  "

## 2024-05-13 NOTE — LETTER
5/13/2024    Ayleen Castro, DO  [address] [place] [address]    RE: Kelly Mckeon       Dear Colleague,     I had the pleasure of seeing Kelly Mckeon in the [facility].         [facility]   [address], [place] [address]   [email or web address]   OFFICE: [phone]            Impression and Plan     1.  Atrial fibrillation (paroxysmal).  She has been maintained on apixaban for CVA prophylaxis.  She was recently seen by Karma PULIDO CNP who documents that arrangements were made for Kelly to follow-up with one of our Electrophysiologists to discuss possible ablation.  Plan to continue apixaban.  Continue current dose of metoprolol.  Consultation with Dr. Bret Coulter as already arranged by Karma Dawson  in July 2024.     2.  Hypertension.  Pressure is reasonable in the office today at 124/82 mmHg.     3.  Obstructive sleep apnea.  Kelly was diagnosed with mild obstructive sleep apnea on polysomnogram a couple of years ago.  She was offered CPAP at that time though had opted to defer CPAP therapy.  We discussed this again today and how sleep apnea can be a trigger for atrial fibrillation.  She states that she would be amenable to visiting with Sleep Medicine once again to discuss further.  Will make referral to Sleep Medicine.     Plan on follow-up in 1 year.    35 minutes spent reviewing prior records (including documentation, laboratory studies, cardiac testing/imaging), interview with patient along with physical exam, planning, and subsequent documentation/crafting of note).           History of Present Illness    Once again I would like to thank you again for asking me to participate in the care of your patient, Kelly Mckeon.  As you know, but to reiterate for my own records, Kelly Mckeon is a 73 year old female with history of paroxysmal atrial fibrillation.     On interview, Kelly does state  "that since my last visit with her she has had some mild increase in frequency of atrial fibrillation.  She subjectively has an episode every 1 to 2 months.  Generally these are short-lived though she did have a 6-hour episode.    She recently met with Karma PULIDO CNP in the Atrial Fibrillation Clinic.  At that time possible ablation was broached and she did have some initial interest in considering this.  We discussed this some more today and she already has a visit with Dr. Coulter in 1 to 2 months.      Further review of systems is otherwise negative/noncontributory (medical record and 13 point review of systems reviewed as well and pertinent positives noted).         Cardiac Diagnostics        Echocardiogram 10 September 2021:  Normal left ventricular size and systolic performance with ejection fraction of 60 to 65%.  No significant valvular heart disease.  Normal right ventricular size and systolic performance.  Normal atrial dimensions.    Ambulatory monitor x25 days September/October 2021:  Predominantly sinus rhythm.  Paroxysmal atrial fibrillation - single episode lasting 2hrs 26min associated with borderline high average ventricular rate.  Symptom triggers (3) correlated to atrial fibrillation, sinus rhythm with isolated PAC.            Physical Examination       /82 (BP Location: Right arm, Patient Position: Sitting, Cuff Size: Adult Large)   Pulse 60   Resp 16   Ht 1.676 m (5' 6\")   Wt 100.9 kg (222 lb 8 oz)   BMI 35.91 kg/m          Wt Readings from Last 3 Encounters:   05/13/24 100.9 kg (222 lb 8 oz)   04/23/24 101.3 kg (223 lb 4.8 oz)   09/26/23 100.6 kg (221 lb 11.2 oz)     The patient is alert and oriented times three. Sclerae are anicteric. Mucosal membranes are moist. Jugular venous pressure is normal. No significant adenopathy/thyromegally appreciated. Lungs are clear with good expansion. On cardiovascular exam, the patient has a regular S1 and S2.  Heart sounds are somewhat " distant.  Abdomen is soft and non-tender. Extremities reveal no clubbing, cyanosis, or edema.         Family History/Social History/Risk Factors   Patient does not smoke.  Family history reviewed, and family history includes Emphysema in her mother; Heart Failure in her mother; Melanoma in her mother; No Known Problems in her father.          Medical History  Surgical History Family History Social History   Obstructive sleep apnea  Paroxysmal atrial fibrillation Past Surgical History:   Procedure Laterality Date    AS REPAIR/GRAFT ACHILLES TENDON Right 2011    benign ovarian tumor removal  1999    wisdom teeth      ZZC MOLE REMOVAL (LESIONS) TIER 1       Family History   Problem Relation Age of Onset    Heart Failure Mother     Emphysema Mother     Melanoma Mother     No Known Problems Father         Social History     Socioeconomic History    Marital status:      Spouse name: Not on file    Number of children: Not on file    Years of education: Not on file    Highest education level: Not on file   Occupational History    Not on file   Tobacco Use    Smoking status: Never    Smokeless tobacco: Never   Vaping Use    Vaping status: Never Used   Substance and Sexual Activity    Alcohol use: Never    Drug use: Never    Sexual activity: Yes     Partners: Male   Other Topics Concern    Not on file   Social History Narrative    Not on file     Social Determinants of Health     Financial Resource Strain: Low Risk  (9/26/2023)    Financial Resource Strain     Within the past 12 months, have you or your family members you live with been unable to get utilities (heat, electricity) when it was really needed?: No   Food Insecurity: Low Risk  (9/26/2023)    Food Insecurity     Within the past 12 months, did you worry that your food would run out before you got money to buy more?: No     Within the past 12 months, did the food you bought just not last and you didn t have money to get more?: No   Transportation Needs: Low  Risk  (9/26/2023)    Transportation Needs     Within the past 12 months, has lack of transportation kept you from medical appointments, getting your medicines, non-medical meetings or appointments, work, or from getting things that you need?: No   Physical Activity: Not on file   Stress: Not on file   Social Connections: Unknown (1/1/2022)    [facility], [facility]    Social Connections     Frequency of Communication with Friends and Family: Not on file   Interpersonal Safety: Not on file   Housing Stability: Low Risk  (9/26/2023)    Housing Stability     Do you have housing? : Yes     Are you worried about losing your housing?: No           Medications  Allergies   Current Outpatient Medications   Medication Sig Dispense Refill    Ascorbic Acid (VITAMIN C PO) Take 500 mg by mouth daily       ELIQUIS ANTICOAGULANT 5 MG tablet TAKE 1 TABLET BY MOUTH 2 TIMES DAILY 180 tablet 1    losartan-hydrochlorothiazide (HYZAAR) 100-25 MG tablet Take 1 tablet by mouth daily 90 tablet 3    metoprolol succinate ER (TOPROL XL) 25 MG 24 hr tablet TAKE 1 TABLET BY MOUTH 2 TIMES DAILY 180 tablet 1    rosuvastatin (CRESTOR) 5 MG tablet Take 1 tablet (5 mg) by mouth daily 90 tablet 3    vitamin D3 (CHOLECALCIFEROL) 2000 units (50 mcg) tablet Take 2,000 Units by mouth daily         Allergies   Allergen Reactions    Lisinopril Cough    Perflutren Lipid Microsphere     Perflutren Lipid Microsphere [Perflutren Lipid Microspheres]      Severe back pain    Seasonal Allergies           Lab Results    Chemistry/lipid CBC Cardiac Enzymes/BNP/TSH/INR   Recent Labs   Lab Test 07/21/23  0840   CHOL 190   HDL 77   LDL 94   TRIG 96     Recent Labs   Lab Test 07/21/23  0840 09/15/22  0930 09/16/21  1107   LDL 94 85 73     Recent Labs   Lab Test 07/21/23  0840      POTASSIUM 3.7   CHLORIDE 106   CO2 26   GLC 93   BUN 16.2   CR 0.89   GFRESTIMATED 69   CONSTANTIN 9.3  "    Recent Labs   Lab Test 07/21/23  0840 09/15/22  0930 09/16/21  1107   CR 0.89 0.98* 0.81     No results for input(s): \"A1C\" in the last 89528 hours.       Recent Labs   Lab Test 07/21/23  0840   WBC 5.9   HGB 13.1   HCT 39.2   MCV 92        Recent Labs   Lab Test 07/21/23  0840 09/15/22  0930 02/02/21  1628   HGB 13.1 13.2 13.5    No results for input(s): \"TROPONINI\" in the last 25455 hours.  No results for input(s): \"BNP\", \"NTBNPI\", \"NTBNP\" in the last 02600 hours.  Recent Labs   Lab Test 02/02/21  1628   TSH 0.76     No results for input(s): \"INR\" in the last 50820 hours.       Medications  Allergies   Current Outpatient Medications   Medication Sig Dispense Refill    Ascorbic Acid (VITAMIN C PO) Take 500 mg by mouth daily       ELIQUIS ANTICOAGULANT 5 MG tablet TAKE 1 TABLET BY MOUTH 2 TIMES DAILY 180 tablet 1    losartan-hydrochlorothiazide (HYZAAR) 100-25 MG tablet Take 1 tablet by mouth daily 90 tablet 3    metoprolol succinate ER (TOPROL XL) 25 MG 24 hr tablet TAKE 1 TABLET BY MOUTH 2 TIMES DAILY 180 tablet 1    rosuvastatin (CRESTOR) 5 MG tablet Take 1 tablet (5 mg) by mouth daily 90 tablet 3    vitamin D3 (CHOLECALCIFEROL) 2000 units (50 mcg) tablet Take 2,000 Units by mouth daily        Allergies   Allergen Reactions    Lisinopril Cough    Perflutren Lipid Microsphere     Perflutren Lipid Microsphere [Perflutren Lipid Microspheres]      Severe back pain    Seasonal Allergies           Lab Results   Lab Results   Component Value Date     07/21/2023     02/02/2021    CO2 26 07/21/2023    CO2 27 09/16/2021    CO2 29 02/02/2021    BUN 16.2 07/21/2023    BUN 14 09/16/2021    BUN 18 02/02/2021     Lab Results   Component Value Date    WBC 5.9 07/21/2023    WBC 6.9 02/02/2021    HGB 13.1 07/21/2023    HGB 13.5 02/02/2021    HCT 39.2 07/21/2023    HCT 40.4 02/02/2021    MCV 92 07/21/2023    MCV 91 02/02/2021     07/21/2023     02/02/2021     Lab Results   Component Value Date "    CHOL 190 07/21/2023    CHOL 175 09/06/2020    TRIG 96 07/21/2023    TRIG 74 09/06/2020    HDL 77 07/21/2023    HDL 75 09/06/2020     Lab Results   Component Value Date    TSH 0.76 02/02/2021                      Thank you for allowing me to participate in the care of your patient.      Sincerely,     Grecia Rivera MD     [facility]  cc:   Grecia Rivera MD  [address] [place] [address]  [place] [address]

## 2024-05-30 ENCOUNTER — HOSPITAL ENCOUNTER (OUTPATIENT)
Dept: MAMMOGRAPHY | Facility: CLINIC | Age: 74
Discharge: HOME | End: 2024-05-30
Attending: INTERNAL MEDICINE | Admitting: INTERNAL MEDICINE
Payer: COMMERCIAL

## 2024-05-30 DIAGNOSIS — Z12.31 VISIT FOR SCREENING MAMMOGRAM: ICD-10-CM

## 2024-05-30 PROCEDURE — 77063 BREAST TOMOSYNTHESIS BI: CPT

## 2024-07-24 ENCOUNTER — OFFICE VISIT (OUTPATIENT)
Dept: CARDIOLOGY | Facility: CLINIC | Age: 74
End: 2024-07-24
Attending: NURSE PRACTITIONER
Payer: COMMERCIAL

## 2024-07-24 VITALS
SYSTOLIC BLOOD PRESSURE: 146 MMHG | WEIGHT: 222 LBS | HEART RATE: 68 BPM | HEIGHT: 66 IN | BODY MASS INDEX: 35.68 KG/M2 | RESPIRATION RATE: 12 BRPM | DIASTOLIC BLOOD PRESSURE: 68 MMHG

## 2024-07-24 DIAGNOSIS — I48.0 PAROXYSMAL ATRIAL FIBRILLATION (H): Primary | ICD-10-CM

## 2024-07-24 PROCEDURE — 99204 OFFICE O/P NEW MOD 45 MIN: CPT | Performed by: INTERNAL MEDICINE

## 2024-07-24 RX ORDER — MULTIVITAMIN,THERAPEUTIC
1 TABLET ORAL DAILY
COMMUNITY

## 2024-07-24 NOTE — PATIENT INSTRUCTIONS
[facility]  Cardiac Electrophysiology  [address]  [place] [address]   Office: [phone]  Fax: [phone]     Thank you for seeing us in clinic today - it is a pleasure to be a part of your care team.  Below is a summary of our plan from today's visit.       You have atrial fibrillation.  We reviewed atrial fibrillation, treatment options (ablation vs antiarrhythmic drug therapy), and long term stroke risk prevention (blood thinner vs Watchman device).    We will plan for the following:  - Zio monitoring, 14 days (mail out) to assess your current atrial fibrillation burden  - consider antiarrhythmic drug therapy vs ablation further  - continue metoprolol XL 25mg twice daily  - continue apixaban 5mg twice daily     Please do not hesitate to be in touch with our office at [phone] with any questions that may arise.       Thank you for trusting us with your care,    Guilherme Coulter MD  Clinical Cardiac Electrophysiology  [facility]  [address]  [place] [address]   Office: [phone]  Fax: [phone]        ATRIAL FIBRILLATION: Patient Information    What is atrial fibrillation?  Atrial fibrillation (AF, A-fib) is a common heart rhythm problem (arrhythmia) occurring within the upper chambers of the heart (the atria).  In normal rhythm, the upper and lower chambers of the heart are electrically driven to contract in a coordinated sequence.  In atrial fibrillation, the atria lose their ability to contract because of rapid and chaotic electrical activity.  The lower chambers of the heart (the ventricles) continue to pump blood throughout the body, though with irregular and often faster rate due to the chaotic activity within the atria.        How do I know if I have atrial fibrillation?   Some people may feel their heart beating faster, harder, or irregularly while in atrial fibrillation.  Others may be lightheaded,  fatigued, feel weak or tired or become more short of breath especially with activities.  Some patients have no symptoms at all.  Atrial fibrillation may be found due to an irregular pulse or on an electrocardiogram (ECG). Atrial fibrillation can start and stop on its own, and episodes can last from seconds to several months.      How common is atrial fibrillation?   An estimated 3-6 million people in the [place] have atrial fibrillation.  Atrial fibrillation is a common heart rhythm problem for older persons, affecting as estimated 12-15% of people over the age of 65 years of age.    What causes atrial fibrillation?   Age is the most important risk factor for atrial fibrillation.  Atrial fibrillation is more common in people with other heart disease, high blood pressure, diabetes, obesity, sleep apnea and in people who regularly consume alcohol.  Surgery, lung disease, or thyroid problems can lead to atrial fibrillation.  Atrial fibrillation has multiple possible causes, and in most cases a single cause cannot be found.  Atrial fibrillation is a progressive condition, usually starting with at an early stage with short and infrequent episodes.  In later stages of disease, more frequent and longer lasting episodes of atrial fibrillation occur, ultimately culminating in episodes which do not spontaneously terminate.  Generally, more enlargement and scarring within the upper chambers of the heart is observed as atrial fibrillation progresses from early to late-stage disease.    How is atrial fibrillation diagnosed and evaluated?    Because of its start-stop nature, atrial fibrillation can be challenging to diagnose.  Atrial fibrillation is most commonly diagnosed via cardiac rhythm recordings - either an ECG or wearable cardiac rhythm monitor.  For patients with pacemakers, defibrillators or implantable loop recorders, atrial fibrillation may be recorded via these devices.  Recently, commercially available devices  (eg. Apple Watch, Safehousea device, certain FitBit devices, others) can allow patients to take 30 second cardiac rhythm recordings which may document atrial fibrillation.  Once atrial fibrillation is diagnosed, additional tests include blood tests and an echocardiogram.  The echocardiogram uses ultrasound to look at your heart to assess your cardiac function and evaluate for other heart disease.  Additional evaluation may include CT or MRI studies.    Is atrial fibrillation dangerous?   Atrial fibrillation is not usually a life-threatening arrhythmia.  The most serious consequences of atrial fibrillation including stroke and worsening of overall cardiac function.  While in atrial fibrillation, the upper cardiac chambers do not contract normally, resulting in slower blood flow and increased risk of clot formation.  If this blood clot becomes detached from the heart a stroke can occur.  Unfortunately, stroke can be the first sign of atrial fibrillation for some people.  With a stroke, you may notice abnormal sensation, weakness on one side of the body or face, changes in your vision or speech.  If you have any of these signs, you should contact EMS and be evaluated in an emergency room as soon as possible.      How is atrial fibrillation treated?     Several treatment options exist for suppressing atrial fibrillation - however, it is not an easily curable arrhythmia.  The first goal in managing atrial fibrillation is to minimize stroke risk.  The second goal is to improve symptoms associated with atrial fibrillation.  Finally, in patients with reduced cardiac function, maintaining normal rhythm can help improve cardiac function.      Blood thinners are used to reduce the risk of stroke in patients with high estimated stroke risk related to atrial fibrillation.  For patients at higher risk of bleeding related to blood thinner use, implantable devices may be an option to reduce stroke risk without the need for long term  blood thinner use.      Atrial fibrillation can be managed via two strategies: rate control and rhythm control.  In a rate control strategy, continued atrial fibrillation is accepted and medications (eg. beta-blockers or calcium channel blockers) are used to control the lower chamber rate.  In a rhythm control strategy, anti-arrhythmic medications or catheter ablation are used to maintain normal cardiac rhythm and slow disease progression by suppressing atrial fibrillation.  A procedure called a cardioversion, in which an electric shock is delivered through patches placed on the chest wall while under deep sedation, can be performed to temporarily restore normal cardiac rhythm, though does not address the chance of atrial fibrillation recurrence.  Treatments are more effective for earlier rather than later stage atrial fibrillation.  Lifestyle modifications (maintaining a healthy weight, aerobic exercise, diagnosing and treating sleep apnea, and minimizing alcohol intake) are important elements of atrial fibrillation rhythm control.     What is catheter ablation for atrial fibrillation?  Cardiac catheter ablation is a commonly performed, minimally invasive procedure performed by a cardiac electrophysiologist to treat many different cardiac rhythm abnormalities.  During catheter ablation, long, thin, flexible tubes are advanced into the heart via small sheaths inserted into the femoral veins and thermal energy (either heating or cooling) is applied within the heart to disrupt abnormal electrical activity.  Atrial fibrillation ablation is performed under general anesthesia, with procedures generally taking approximately 2-3 hours.  Patients are typically observed for 3-5 hours after the ablation, and in most cases can be discharged home the same day.  Atrial fibrillation ablation is associated with better outcomes (mortality, cardiovascular hospitalizations, atrial arrhythmia recurrences) compared to antiarrhythmic  drug therapy.  However, atrial fibrillation recurrences are not uncommon, and repeat catheter ablation may be offered.  Your electrophysiology team can review atrial fibrillation ablation, anticipated success rates, risks, and recovery expectations with you.    What are anti-arrhythmic medications?  Anti-arrhythmic medications are specialized drugs which alter cardiac electrical functioning to suppress arrhythmias.  There are several anti-arrhythmic medications available, each with its own success rate and side effects.  Some anti-arrhythmic medications are less effective though safer to use, others are more effective though have serious potential toxicities.  Atrial fibrillation recurrences are common and may require dose adjustment or change in antiarrhythmic therapy.  Your electrophysiology team will carefully consider which medication would be the best and safest for your particular case.      Can I live a normal life?    The goal of atrial fibrillation management is for patients to live normal lives without being limited by symptoms related to atrial fibrillation.    Are any additional educational resources available?  There are a number of excellent atrial fibrillation education resources available to you online.  A few options you may wish to review include:  hrsonline.org/guide-atrial-fibrillation  afibmatters.org  getsmartaboutafib.com  stopaf.com    What comes next?    Consider your management options and let us know how we can help in your decision process.  Please take medications as they have been prescribed.  You should also get any tests that may have been ordered for you.      When to Call Your Doctor or seek emergency care:  Call your doctor or seek emergency care if you have any significant changes with the following:  Weakness  Dizziness  Fainting  Fatigue  Shortness of breath  Chest pain with increased activity  If you are concerned that your heart rate is too fast or too slow  Bleeding that does  not stop in 10 minutes  Coughing or throwing up blood  Bloody diarrhea or bleeding hemorrhoids  Dark-colored urine or black stool  Allergic reactions:  Rash  Itching  Swelling  Trouble breathing or swallowing      Please call the [facility] at [phone] if you have concerns about your symptoms, your medicines, or your follow-up appointments.

## 2024-07-24 NOTE — PROGRESS NOTES
[facility]  Cardiac Electrophysiology  [address]  [place] [address]   Office: [phone]  Fax: [phone]     Patient: Kelly Mckeon   : 1950       CHIEF COMPLAINT/REASON FOR VISIT  Paroxysmal atrial fibrillation      Assessment/Recommendations     [id number]/Paroxysmal atrial fibrillation - symptomatic with palpitations, current burden unknown  [id number]  We reviewed atrial fibrillation physiology, managing stroke risk, antiarrhythmic drug therapy, and catheter ablation.  We discussed atrial fibrillation ablation procedures, anticipated success rates, the potential need for re-do ablation vs addition of anti-arrhythmic drugs, procedural risks (including groin bleeding, vascular injury, tamponade, phrenic or esophageal injury, stroke, pulmonary vein stenosis) and recovery expectations.  We will plan for the following:  - Zio monitoring, 14 days (mail out) to assess your current atrial fibrillation burden  - consider antiarrhythmic drug therapy vs ablation further  - continue metoprolol XL 25mg twice daily  - continue apixaban 5mg twice daily  - we discussed the ongoing importance of lifestyle modification (maintaining a healthy weight, aerobic activity, sleep apnea diagnosis and management, alcohol avoidance) as part of a long term strategy for atrial fibrillation management    Follow up: as above           History of Present Illness   Kelly Mckeon is a 74 year old female with paroxysmal atrial fibrillation, HTN, VIOLET, obesity referred by Karma Dawson CNP for consultation regarding atrial fibrillation.    Mrs. Mckeon's atrial fibrillation history is as summarized below:  Symptoms: palpitations  Symptom onset date:   Diagnosis date:  (MCT)  Admissions/ER visits: none  Prior medical therapies: no AADs  Prior DCCVs: none  Prior ablations: none  Percutaneous left atrial appendage occlusion: none    She notes intermittent AF episodes,  "though frequency characterization is unclear - she notes that when she checks her pulse when feeling well, she notes skipped beats/irregular pulse.  She denies chest pain, syncope.  Her  underwent AF and AFL ablation 1/18/2024.  She lives in [place], [place].       Physical Examination  Review of Systems   VITALS: BP (!) 146/68 (BP Location: Left arm, Patient Position: Sitting, Cuff Size: Adult Large)   Pulse 68   Resp 12   Ht 1.676 m (5' 6\")   Wt 100.7 kg (222 lb)   BMI 35.83 kg/m    Wt Readings from Last 3 Encounters:   05/13/24 100.9 kg (222 lb 8 oz)   04/23/24 101.3 kg (223 lb 4.8 oz)   09/26/23 100.6 kg (221 lb 11.2 oz)     CONSTITUTIONAL: well nourished, comfortable, no distress  EYES:  Conjunctivae pink, sclerae clear.    E/N/T:  Oral mucosa pink  RESPIRATORY:  Respiratory effort is normal  CARDIOVASCULAR:  normal S1 and S2  GASTROINTESTINAL:  Abdomen without masses or tenderness  EXTREMITIES:  No clubbing or cyanosis.    MUSCULOSKELETAL:  Overall grossly normal muscle strength  SKIN:  Overall, skin warm and dry, no lesions.  NEURO/PSYCH:  Oriented x 3 with normal affect.   Constitutional:  No weight loss or loss of appetite    Eyes:  No difficulty with vision, no double vision, no dry eyes  ENT:  No sore throat, difficulty swallowing; changes in hearing or tinnitus  Cardiovascular: As detailed above  Respiratory:  No cough  Musculoskeletal  No joint pain, muscle aches  Neurologic:  No syncope, lightheadedness, fainting spells   Hematologic: No easy bruising, excessive bleeding tendency   Gastrointestinal:  No jaundice, abdominal pain or abdominal bloating  Genitourinary: No changes in urinary habits, no trouble urinating    Psychiatric: No anxiety or depression      Medical History  Surgical History   No past medical history on file. Past Surgical History:   Procedure Laterality Date    AS REPAIR/GRAFT ACHILLES TENDON Right 2011    benign ovarian tumor removal  1999    wisdom teeth      ZZC MOLE " "REMOVAL (LESIONS) TIER 1           Family History Social History   Family History   Problem Relation Age of Onset    Heart Failure Mother     Emphysema Mother     Melanoma Mother     No Known Problems Father         Social History     Tobacco Use    Smoking status: Never    Smokeless tobacco: Never   Vaping Use    Vaping status: Never Used   Substance Use Topics    Alcohol use: Never    Drug use: Never         Medications  Allergies     Current Outpatient Medications:     Ascorbic Acid (VITAMIN C PO), Take 500 mg by mouth daily , Disp: , Rfl:     ELIQUIS ANTICOAGULANT 5 MG tablet, TAKE 1 TABLET BY MOUTH 2 TIMES DAILY, Disp: 180 tablet, Rfl: 1    losartan-hydrochlorothiazide (HYZAAR) 100-25 MG tablet, Take 1 tablet by mouth daily, Disp: 90 tablet, Rfl: 3    metoprolol succinate ER (TOPROL XL) 25 MG 24 hr tablet, TAKE 1 TABLET BY MOUTH 2 TIMES DAILY, Disp: 180 tablet, Rfl: 1    rosuvastatin (CRESTOR) 5 MG tablet, Take 1 tablet (5 mg) by mouth daily, Disp: 90 tablet, Rfl: 3    vitamin D3 (CHOLECALCIFEROL) 2000 units (50 mcg) tablet, Take 2,000 Units by mouth daily, Disp: , Rfl:      Allergies   Allergen Reactions    Lisinopril Cough    Perflutren Lipid Microsphere     Perflutren Lipid Microsphere [Perflutren Lipid Microspheres]      Severe back pain    Seasonal Allergies           Lab Results    Chemistry CBC Cardiac Enzymes/BNP/TSH/INR   Recent Labs   Lab Test 07/21/23  0840      POTASSIUM 3.7   CHLORIDE 106   CO2 26   GLC 93   BUN 16.2   CR 0.89   GFRESTIMATED 69   CONSTANTIN 9.3     Recent Labs   Lab Test 07/21/23  0840 09/15/22  0930 09/16/21  1107   CR 0.89 0.98* 0.81          Recent Labs   Lab Test 07/21/23  0840   WBC 5.9   HGB 13.1   HCT 39.2   MCV 92        Recent Labs   Lab Test 07/21/23  0840 09/15/22  0930 02/02/21  1628   HGB 13.1 13.2 13.5    No results for input(s): \"TROPONINI\" in the last 64229 hours.  No results for input(s): \"BNP\", \"NTBNPI\", \"NTBNP\" in the last 69413 hours.  Recent Labs   Lab " "Test 02/02/21  1628   TSH 0.76     No results for input(s): \"INR\" in the last 58752 hours.      Data Review    ECGs (tracings independently reviewed)  2/3/2021 - SR 67bpm    9/2021 MCT (independently reviewed)  Predominant rhythm sinus average 70 bpm.   Paroxysmal AF present, single episode lasting 2.5 hours, average 97 bpm, range  bpm.   1 episode of SVT x 12 beats up to 167 bpm.   Symptom triggers correlated to AF, sinus rhythm with isolated PAC     9/10/2021 TTE  1.Left ventricular size, wall motion and function are normal. The ejection  fraction is 60-65%, visualy hyperdynamic.  2.TAPSE is normal, which is consistent with normal right ventricular systolic  function.  3.No hemodynamically significant valvular abnormalities on 2D or color flow  imaging.  4.There is no comparison study available       Cc: Matthew Pinon CNP, Rebecca Sara, DO Guilherme Coulter MD  7/24/2024  12:06 PM      "

## 2024-07-24 NOTE — LETTER
2024    Ayleen Castro, DO  [address] [facility] [address] [address]    RE: Kelly Mckeon       Dear Colleague,     I had the pleasure of seeing Kelly Mckeon in the [facility].     [facility]  Cardiac Electrophysiology  [address]  [place] [address]   Office: [phone]  Fax: [phone]     Patient: Kelly Mckeon   : 1950       CHIEF COMPLAINT/REASON FOR VISIT  Paroxysmal atrial fibrillation      Assessment/Recommendations     [id number]/Paroxysmal atrial fibrillation - symptomatic with palpitations, current burden unknown  [id number] 3  We reviewed atrial fibrillation physiology, managing stroke risk, antiarrhythmic drug therapy, and catheter ablation.  We discussed atrial fibrillation ablation procedures, anticipated success rates, the potential need for re-do ablation vs addition of anti-arrhythmic drugs, procedural risks (including groin bleeding, vascular injury, tamponade, phrenic or esophageal injury, stroke, pulmonary vein stenosis) and recovery expectations.  We will plan for the following:  - Zio monitoring, 14 days (mail out) to assess your current atrial fibrillation burden  - consider antiarrhythmic drug therapy vs ablation further  - continue metoprolol XL 25mg twice daily  - continue apixaban 5mg twice daily  - we discussed the ongoing importance of lifestyle modification (maintaining a healthy weight, aerobic activity, sleep apnea diagnosis and management, alcohol avoidance) as part of a long term strategy for atrial fibrillation management    Follow up: as above           History of Present Illness   Kelly Mckeon is a 74 year old female with paroxysmal atrial fibrillation, HTN, VIOLET, obesity referred by Karma Dawson CNP for consultation regarding atrial fibrillation.    Mrs. Mckeon's atrial fibrillation history is as summarized below:  Symptoms: palpitations  Symptom onset date:   Diagnosis  "date: 2021 (MCT)  Admissions/ER visits: none  Prior medical therapies: no AADs  Prior DCCVs: none  Prior ablations: none  Percutaneous left atrial appendage occlusion: none    She notes intermittent AF episodes, though frequency characterization is unclear - she notes that when she checks her pulse when feeling well, she notes skipped beats/irregular pulse.  She denies chest pain, syncope.  Her  underwent AF and AFL ablation 1/18/2024.  She lives in [place], [place].       Physical Examination  Review of Systems   VITALS: BP (!) 146/68 (BP Location: Left arm, Patient Position: Sitting, Cuff Size: Adult Large)   Pulse 68   Resp 12   Ht 1.676 m (5' 6\")   Wt 100.7 kg (222 lb)   BMI 35.83 kg/m    Wt Readings from Last 3 Encounters:   05/13/24 100.9 kg (222 lb 8 oz)   04/23/24 101.3 kg (223 lb 4.8 oz)   09/26/23 100.6 kg (221 lb 11.2 oz)     CONSTITUTIONAL: well nourished, comfortable, no distress  EYES:  Conjunctivae pink, sclerae clear.    E/N/T:  Oral mucosa pink  RESPIRATORY:  Respiratory effort is normal  CARDIOVASCULAR:  normal S1 and S2  GASTROINTESTINAL:  Abdomen without masses or tenderness  EXTREMITIES:  No clubbing or cyanosis.    MUSCULOSKELETAL:  Overall grossly normal muscle strength  SKIN:  Overall, skin warm and dry, no lesions.  NEURO/PSYCH:  Oriented x 3 with normal affect.   Constitutional:  No weight loss or loss of appetite    Eyes:  No difficulty with vision, no double vision, no dry eyes  ENT:  No sore throat, difficulty swallowing; changes in hearing or tinnitus  Cardiovascular: As detailed above  Respiratory:  No cough  Musculoskeletal  No joint pain, muscle aches  Neurologic:  No syncope, lightheadedness, fainting spells   Hematologic: No easy bruising, excessive bleeding tendency   Gastrointestinal:  No jaundice, abdominal pain or abdominal bloating  Genitourinary: No changes in urinary habits, no trouble urinating    Psychiatric: No anxiety or depression      Medical History  " Surgical History   No past medical history on file. Past Surgical History:   Procedure Laterality Date    AS REPAIR/GRAFT ACHILLES TENDON Right 2011    benign ovarian tumor removal  1999    wisdom teeth      ZZC MOLE REMOVAL (LESIONS) TIER 1           Family History Social History   Family History   Problem Relation Age of Onset    Heart Failure Mother     Emphysema Mother     Melanoma Mother     No Known Problems Father         Social History     Tobacco Use    Smoking status: Never    Smokeless tobacco: Never   Vaping Use    Vaping status: Never Used   Substance Use Topics    Alcohol use: Never    Drug use: Never         Medications  Allergies     Current Outpatient Medications:     Ascorbic Acid (VITAMIN C PO), Take 500 mg by mouth daily , Disp: , Rfl:     ELIQUIS ANTICOAGULANT 5 MG tablet, TAKE 1 TABLET BY MOUTH 2 TIMES DAILY, Disp: 180 tablet, Rfl: 1    losartan-hydrochlorothiazide (HYZAAR) 100-25 MG tablet, Take 1 tablet by mouth daily, Disp: 90 tablet, Rfl: 3    metoprolol succinate ER (TOPROL XL) 25 MG 24 hr tablet, TAKE 1 TABLET BY MOUTH 2 TIMES DAILY, Disp: 180 tablet, Rfl: 1    rosuvastatin (CRESTOR) 5 MG tablet, Take 1 tablet (5 mg) by mouth daily, Disp: 90 tablet, Rfl: 3    vitamin D3 (CHOLECALCIFEROL) 2000 units (50 mcg) tablet, Take 2,000 Units by mouth daily, Disp: , Rfl:      Allergies   Allergen Reactions    Lisinopril Cough    Perflutren Lipid Microsphere     Perflutren Lipid Microsphere [Perflutren Lipid Microspheres]      Severe back pain    Seasonal Allergies           Lab Results    Chemistry CBC Cardiac Enzymes/BNP/TSH/INR   Recent Labs   Lab Test 07/21/23  0840      POTASSIUM 3.7   CHLORIDE 106   CO2 26   GLC 93   BUN 16.2   CR 0.89   GFRESTIMATED 69   CONSTANTIN 9.3     Recent Labs   Lab Test 07/21/23  0840 09/15/22  0930 09/16/21  1107   CR 0.89 0.98* 0.81          Recent Labs   Lab Test 07/21/23  0840   WBC 5.9   HGB 13.1   HCT 39.2   MCV 92        Recent Labs   Lab Test  "07/21/23  0840 09/15/22  0930 02/02/21  1628   HGB 13.1 13.2 13.5    No results for input(s): \"TROPONINI\" in the last 57610 hours.  No results for input(s): \"BNP\", \"NTBNPI\", \"NTBNP\" in the last 33184 hours.  Recent Labs   Lab Test 02/02/21  1628   TSH 0.76     No results for input(s): \"INR\" in the last 95712 hours.      Data Review    ECGs (tracings independently reviewed)  2/3/2021 - SR 67bpm    9/2021 MCT (independently reviewed)  Predominant rhythm sinus average 70 bpm.   Paroxysmal AF present, single episode lasting 2.5 hours, average 97 bpm, range  bpm.   1 episode of SVT x 12 beats up to 167 bpm.   Symptom triggers correlated to AF, sinus rhythm with isolated PAC     9/10/2021 TTE  1.Left ventricular size, wall motion and function are normal. The ejection  fraction is 60-65%, visualy hyperdynamic.  2.TAPSE is normal, which is consistent with normal right ventricular systolic  function.  3.No hemodynamically significant valvular abnormalities on 2D or color flow  imaging.  4.There is no comparison study available       Cc: Matthew Pinno CNP, Rebecca Sara, DO Guilherme Coulter MD  7/24/2024  12:06 PM        Thank you for allowing me to participate in the care of your patient.      Sincerely,     Guilherme Coulter MD     [facility]  cc:   ASHOK Pinon CNP  [facility] [address]  [address] [place] [address]      "

## 2024-07-25 ENCOUNTER — ORDERS ONLY (AUTO-RELEASED) (OUTPATIENT)
Dept: CARDIOLOGY | Facility: CLINIC | Age: 74
End: 2024-07-25
Payer: COMMERCIAL

## 2024-07-25 DIAGNOSIS — I48.0 PAROXYSMAL ATRIAL FIBRILLATION (H): ICD-10-CM

## 2024-08-09 ENCOUNTER — TELEPHONE (OUTPATIENT)
Dept: FAMILY MEDICINE | Facility: CLINIC | Age: 74
End: 2024-08-09
Payer: COMMERCIAL

## 2024-08-09 NOTE — TELEPHONE ENCOUNTER
Patient Quality Outreach    Patient is due for the following:   Hypertension -  BP check    Next Steps:   Patient has upcoming appointment, these items will be addressed at that time.    Type of outreach:    Chart review performed, no outreach needed.    Next Steps:  Reach out within 90 days via Letter.    Max number of attempts reached: Yes. Will try again in 90 days if patient still on fail list.    Questions for provider review:    None           Christina Mauro MA  Chart routed to .

## 2024-08-13 PROCEDURE — 93248 EXT ECG>7D<15D REV&INTERPJ: CPT | Performed by: INTERNAL MEDICINE

## 2024-10-01 DIAGNOSIS — I48.0 PAROXYSMAL ATRIAL FIBRILLATION (H): Primary | ICD-10-CM

## 2024-10-02 ENCOUNTER — OFFICE VISIT (OUTPATIENT)
Dept: CARDIOLOGY | Facility: CLINIC | Age: 74
End: 2024-10-02
Payer: COMMERCIAL

## 2024-10-02 ENCOUNTER — OFFICE VISIT (OUTPATIENT)
Dept: FAMILY MEDICINE | Facility: CLINIC | Age: 74
End: 2024-10-02
Attending: INTERNAL MEDICINE
Payer: COMMERCIAL

## 2024-10-02 VITALS
DIASTOLIC BLOOD PRESSURE: 84 MMHG | RESPIRATION RATE: 14 BRPM | WEIGHT: 219.6 LBS | OXYGEN SATURATION: 99 % | BODY MASS INDEX: 35.29 KG/M2 | HEART RATE: 58 BPM | HEIGHT: 66 IN | TEMPERATURE: 96.9 F | SYSTOLIC BLOOD PRESSURE: 126 MMHG

## 2024-10-02 VITALS
HEART RATE: 72 BPM | SYSTOLIC BLOOD PRESSURE: 118 MMHG | WEIGHT: 221 LBS | BODY MASS INDEX: 35.94 KG/M2 | DIASTOLIC BLOOD PRESSURE: 72 MMHG | OXYGEN SATURATION: 97 %

## 2024-10-02 DIAGNOSIS — Z00.00 ENCOUNTER FOR MEDICARE ANNUAL WELLNESS EXAM: Primary | ICD-10-CM

## 2024-10-02 DIAGNOSIS — M85.852 OSTEOPENIA OF NECKS OF BOTH FEMURS: ICD-10-CM

## 2024-10-02 DIAGNOSIS — E78.5 HYPERLIPIDEMIA LDL GOAL <130: ICD-10-CM

## 2024-10-02 DIAGNOSIS — I48.0 PAROXYSMAL ATRIAL FIBRILLATION (H): Primary | ICD-10-CM

## 2024-10-02 DIAGNOSIS — I48.0 PAROXYSMAL ATRIAL FIBRILLATION (H): ICD-10-CM

## 2024-10-02 DIAGNOSIS — E66.01 MORBID OBESITY (H): ICD-10-CM

## 2024-10-02 DIAGNOSIS — M85.851 OSTEOPENIA OF NECKS OF BOTH FEMURS: ICD-10-CM

## 2024-10-02 DIAGNOSIS — I10 ESSENTIAL HYPERTENSION: ICD-10-CM

## 2024-10-02 LAB
ANION GAP SERPL CALCULATED.3IONS-SCNC: 10 MMOL/L (ref 7–15)
BUN SERPL-MCNC: 17 MG/DL (ref 8–23)
CALCIUM SERPL-MCNC: 9.2 MG/DL (ref 8.8–10.4)
CHLORIDE SERPL-SCNC: 104 MMOL/L (ref 98–107)
CHOLEST SERPL-MCNC: 192 MG/DL
CREAT SERPL-MCNC: 0.93 MG/DL (ref 0.51–0.95)
EGFRCR SERPLBLD CKD-EPI 2021: 65 ML/MIN/1.73M2
ERYTHROCYTE [DISTWIDTH] IN BLOOD BY AUTOMATED COUNT: 12.2 % (ref 10–15)
FASTING STATUS PATIENT QL REPORTED: YES
FASTING STATUS PATIENT QL REPORTED: YES
GLUCOSE SERPL-MCNC: 91 MG/DL (ref 70–99)
HCO3 SERPL-SCNC: 28 MMOL/L (ref 22–29)
HCT VFR BLD AUTO: 42.6 % (ref 35–47)
HDLC SERPL-MCNC: 82 MG/DL
HGB BLD-MCNC: 13.7 G/DL (ref 11.7–15.7)
LDLC SERPL CALC-MCNC: 94 MG/DL
MCH RBC QN AUTO: 29.8 PG (ref 26.5–33)
MCHC RBC AUTO-ENTMCNC: 32.2 G/DL (ref 31.5–36.5)
MCV RBC AUTO: 93 FL (ref 78–100)
NONHDLC SERPL-MCNC: 110 MG/DL
PLATELET # BLD AUTO: 221 10E3/UL (ref 150–450)
POTASSIUM SERPL-SCNC: 3.8 MMOL/L (ref 3.4–5.3)
RBC # BLD AUTO: 4.6 10E6/UL (ref 3.8–5.2)
SODIUM SERPL-SCNC: 142 MMOL/L (ref 135–145)
TRIGL SERPL-MCNC: 81 MG/DL
WBC # BLD AUTO: 6.5 10E3/UL (ref 4–11)

## 2024-10-02 PROCEDURE — 80048 BASIC METABOLIC PNL TOTAL CA: CPT | Performed by: INTERNAL MEDICINE

## 2024-10-02 PROCEDURE — G2211 COMPLEX E/M VISIT ADD ON: HCPCS | Performed by: INTERNAL MEDICINE

## 2024-10-02 PROCEDURE — 80061 LIPID PANEL: CPT | Performed by: INTERNAL MEDICINE

## 2024-10-02 PROCEDURE — 99214 OFFICE O/P EST MOD 30 MIN: CPT | Performed by: INTERNAL MEDICINE

## 2024-10-02 PROCEDURE — 36415 COLL VENOUS BLD VENIPUNCTURE: CPT | Performed by: INTERNAL MEDICINE

## 2024-10-02 PROCEDURE — 99214 OFFICE O/P EST MOD 30 MIN: CPT | Mod: 25 | Performed by: INTERNAL MEDICINE

## 2024-10-02 PROCEDURE — 85027 COMPLETE CBC AUTOMATED: CPT | Performed by: INTERNAL MEDICINE

## 2024-10-02 PROCEDURE — G0439 PPPS, SUBSEQ VISIT: HCPCS | Performed by: INTERNAL MEDICINE

## 2024-10-02 RX ORDER — ROSUVASTATIN CALCIUM 5 MG/1
5 TABLET, COATED ORAL DAILY
Qty: 90 TABLET | Refills: 3 | Status: SHIPPED | OUTPATIENT
Start: 2024-10-02

## 2024-10-02 RX ORDER — SOTALOL HYDROCHLORIDE 80 MG/1
80 TABLET ORAL 2 TIMES DAILY
Qty: 180 TABLET | Refills: 3 | Status: SHIPPED | OUTPATIENT
Start: 2024-10-02

## 2024-10-02 RX ORDER — LOSARTAN POTASSIUM AND HYDROCHLOROTHIAZIDE 25; 100 MG/1; MG/1
1 TABLET ORAL DAILY
Qty: 90 TABLET | Refills: 3 | Status: SHIPPED | OUTPATIENT
Start: 2024-10-02

## 2024-10-02 RX ORDER — ROSUVASTATIN CALCIUM 5 MG/1
5 TABLET, COATED ORAL DAILY
Qty: 90 TABLET | Refills: 3 | Status: CANCELLED | OUTPATIENT
Start: 2024-10-02

## 2024-10-02 SDOH — HEALTH STABILITY: PHYSICAL HEALTH: ON AVERAGE, HOW MANY DAYS PER WEEK DO YOU ENGAGE IN MODERATE TO STRENUOUS EXERCISE (LIKE A BRISK WALK)?: 5 DAYS

## 2024-10-02 ASSESSMENT — PAIN SCALES - GENERAL: PAINLEVEL: NO PAIN (0)

## 2024-10-02 ASSESSMENT — SOCIAL DETERMINANTS OF HEALTH (SDOH): HOW OFTEN DO YOU GET TOGETHER WITH FRIENDS OR RELATIVES?: TWICE A WEEK

## 2024-10-02 NOTE — PATIENT INSTRUCTIONS
Health Care Maintenance  Recommend flu, COVID-vaccine  Recommend  bone density test  Radiology test was ordered.  Please call [phone] to schedule.    Hypertension        Patient Education   Preventive Care Advice   This is general advice given by our system to help you stay healthy. However, your care team may have specific advice just for you. Please talk to your care team about your preventive care needs.  Nutrition  Eat 5 or more servings of fruits and vegetables each day.  Try wheat bread, brown rice and whole grain pasta (instead of white bread, rice, and pasta).  Get enough calcium and vitamin D. Check the label on foods and aim for 100% of the RDA (recommended daily allowance).  Lifestyle  Exercise at least 150 minutes each week  (30 minutes a day, 5 days a week).  Do muscle strengthening activities 2 days a week. These help control your weight and prevent disease.  No smoking.  Wear sunscreen to prevent skin cancer.  Have a dental exam and cleaning every 6 months.  Yearly exams  See your health care team every year to talk about:  Any changes in your health.  Any medicines your care team has prescribed.  Preventive care, family planning, and ways to prevent chronic diseases.  Shots (vaccines)   HPV shots (up to age 26), if you've never had them before.  Hepatitis B shots (up to age 59), if you've never had them before.  COVID-19 shot: Get this shot when it's due.  Flu shot: Get a flu shot every year.  Tetanus shot: Get a tetanus shot every 10 years.  Pneumococcal, hepatitis A, and RSV shots: Ask your care team if you need these based on your risk.  Shingles shot (for age 50 and up)  General health tests  Diabetes screening:  Starting at age 35, Get screened for diabetes at least every 3 years.  If you are younger than age 35, ask your care team if you should be screened for diabetes.  Cholesterol test: At age 39, start having a cholesterol test every 5 years, or more often if advised.  Bone density scan  (DEXA): At age 50, ask your care team if you should have this scan for osteoporosis (brittle bones).  Hepatitis C: Get tested at least once in your life.  STIs (sexually transmitted infections)  Before age 24: Ask your care team if you should be screened for STIs.  After age 24: Get screened for STIs if you're at risk. You are at risk for STIs (including HIV) if:  You are sexually active with more than one person.  You don't use condoms every time.  You or a partner was diagnosed with a sexually transmitted infection.  If you are at risk for HIV, ask about PrEP medicine to prevent HIV.  Get tested for HIV at least once in your life, whether you are at risk for HIV or not.  Cancer screening tests  Cervical cancer screening: If you have a cervix, begin getting regular cervical cancer screening tests starting at age 21.  Breast cancer scan (mammogram): If you've ever had breasts, begin having regular mammograms starting at age 40. This is a scan to check for breast cancer.  Colon cancer screening: It is important to start screening for colon cancer at age 45.  Have a colonoscopy test every 10 years (or more often if you're at risk) Or, ask your provider about stool tests like a FIT test every year or Cologuard test every 3 years.  To learn more about your testing options, visit:   .  For help making a decision, visit:   https://bit.ly/oq35275.  Prostate cancer screening test: If you have a prostate, ask your care team if a prostate cancer screening test (PSA) at age 55 is right for you.  Lung cancer screening: If you are a current or former smoker ages 50 to 80, ask your care team if ongoing lung cancer screenings are right for you.  For informational purposes only. Not to replace the advice of your health care provider. Copyright   2023 [employer]. All rights reserved. Clinically reviewed by the [facility] Transitions Program. [employer] [id number] - REV 01/24.

## 2024-10-02 NOTE — PROGRESS NOTES
[facility]  Cardiac Electrophysiology  [address]  [place] [address]   Office: [phone]  Fax: [phone]     Patient: Kelly Mckeon   : 1950       CHIEF COMPLAINT/REASON FOR VISIT  Paroxysmal atrial fibrillation      Assessment/Recommendations     Tkray1A/Paroxysmal atrial fibrillation - symptomatic with palpitations, current burden unknown  [id number] 3  We reviewed atrial fibrillation physiology, managing stroke risk, antiarrhythmic drug therapy, and catheter ablation.  We discussed atrial fibrillation ablation procedures, anticipated success rates, the potential need for re-do ablation vs addition of anti-arrhythmic drugs, procedural risks (including groin bleeding, vascular injury, tamponade, phrenic or esophageal injury, stroke, pulmonary vein stenosis) and recovery expectations.  She would prefer initiation of medical therapy as a first step. We will plan for the following:  - start sotalol 80mg twice daily in place of metoprolol XL, ECG 3 days thereafter  - stop metoprolol XL 25mg twice daily  - continue apixaban 5mg twice daily  - consider a Kardia device or Smart watch with ECG capabilities for intermittent rhythm checks  - the longitudinal plan of care was addressed during this visit. Due to the added complexity in care, our team will remain engaged subsequent management of this condition and ongoing continuity of care    Follow up: as above           History of Present Illness   Kelly Mckeon is a 74 year old female with paroxysmal atrial fibrillation, HTN, VIOLET, obesity presenting for follow-up regarding atrial fibrillation.    Mrs. Mckeon's atrial fibrillation history is as summarized below:  Symptoms: palpitations  Symptom onset date:   Diagnosis date:  (MCT)  Admissions/ER visits: none  Prior medical therapies: no AADs  Prior DCCVs: none  Prior ablations: none  Percutaneous left atrial appendage occlusion: none    She  notes intermittent AF episodes, though frequency characterization is unclear - she notes that when she checks her pulse when feeling well, she notes skipped beats/irregular pulse.  She denies chest pain, syncope.  Her  underwent AF and AFL ablation 1/18/2024.  She lives in [place], [place].       Physical Examination  Review of Systems   VITALS: /72 (BP Location: Left arm, Patient Position: Sitting, Cuff Size: Adult Large)   Pulse 72   Wt 100.2 kg (221 lb)   SpO2 97%   BMI 35.94 kg/m    Wt Readings from Last 3 Encounters:   07/24/24 100.7 kg (222 lb)   05/13/24 100.9 kg (222 lb 8 oz)   04/23/24 101.3 kg (223 lb 4.8 oz)     CONSTITUTIONAL: well nourished, comfortable, no distress  EYES:  Conjunctivae pink, sclerae clear.    E/N/T:  Oral mucosa pink  RESPIRATORY:  Respiratory effort is normal  CARDIOVASCULAR:  normal S1 and S2  GASTROINTESTINAL:  Abdomen without masses or tenderness  EXTREMITIES:  No clubbing or cyanosis.    MUSCULOSKELETAL:  Overall grossly normal muscle strength  SKIN:  Overall, skin warm and dry, no lesions.  NEURO/PSYCH:  Oriented x 3 with normal affect.   Constitutional:  No weight loss or loss of appetite    Eyes:  No difficulty with vision, no double vision, no dry eyes  ENT:  No sore throat, difficulty swallowing; changes in hearing or tinnitus  Cardiovascular: As detailed above  Respiratory:  No cough  Musculoskeletal  No joint pain, muscle aches  Neurologic:  No syncope, lightheadedness, fainting spells   Hematologic: No easy bruising, excessive bleeding tendency   Gastrointestinal:  No jaundice, abdominal pain or abdominal bloating  Genitourinary: No changes in urinary habits, no trouble urinating    Psychiatric: No anxiety or depression      Medical History  Surgical History   No past medical history on file. Past Surgical History:   Procedure Laterality Date    AS REPAIR/GRAFT ACHILLES TENDON Right 2011    benign ovarian tumor removal  1999    wisdom teeth      ZZC MOLE  "REMOVAL (LESIONS) TIER 1           Family History Social History   Family History   Problem Relation Age of Onset    Heart Failure Mother     Emphysema Mother     Melanoma Mother     No Known Problems Father         Social History     Tobacco Use    Smoking status: Never    Smokeless tobacco: Never   Vaping Use    Vaping status: Never Used   Substance Use Topics    Alcohol use: Never    Drug use: Never         Medications  Allergies     Current Outpatient Medications:     ELIQUIS ANTICOAGULANT 5 MG tablet, TAKE 1 TABLET BY MOUTH 2 TIMES DAILY, Disp: 180 tablet, Rfl: 1    losartan-hydrochlorothiazide (HYZAAR) 100-25 MG tablet, Take 1 tablet by mouth daily, Disp: 90 tablet, Rfl: 3    metoprolol succinate ER (TOPROL XL) 25 MG 24 hr tablet, TAKE 1 TABLET BY MOUTH 2 TIMES DAILY, Disp: 180 tablet, Rfl: 1    multivitamin, therapeutic (THERA-VIT) TABS tablet, Take 1 tablet by mouth daily, Disp: , Rfl:     rosuvastatin (CRESTOR) 5 MG tablet, Take 1 tablet (5 mg) by mouth daily, Disp: 90 tablet, Rfl: 3    vitamin D3 (CHOLECALCIFEROL) 2000 units (50 mcg) tablet, Take 2,000 Units by mouth daily, Disp: , Rfl:      Allergies   Allergen Reactions    Lisinopril Cough    Perflutren Lipid Microsphere      Definity dye     Perflutren Lipid Microsphere [Perflutren Lipid Microspheres]      Severe back pain    Seasonal Allergies           Lab Results    Chemistry CBC Cardiac Enzymes/BNP/TSH/INR   Recent Labs   Lab Test 10/02/24  1152      POTASSIUM 3.8   CHLORIDE 104   CO2 28   GLC 91   BUN 17.0   CR 0.93   GFRESTIMATED 65   CONSTANTIN 9.2     Recent Labs   Lab Test 10/02/24  1152 07/21/23  0840 09/15/22  0930   CR 0.93 0.89 0.98*          Recent Labs   Lab Test 10/02/24  1152   WBC 6.5   HGB 13.7   HCT 42.6   MCV 93        Recent Labs   Lab Test 10/02/24  1152 07/21/23  0840 09/15/22  0930   HGB 13.7 13.1 13.2    No results for input(s): \"TROPONINI\" in the last 69985 hours.  No results for input(s): \"BNP\", \"NTBNPI\", \"NTBNP\" in the " "last 91255 hours.  Recent Labs   Lab Test 02/02/21  1628   TSH 0.76     No results for input(s): \"INR\" in the last 78525 hours.      Data Review    ECGs (tracings independently reviewed)  2/3/2021 - SR 67bpm    Zio monitoring from 7/27/2024 to 8/9/2024 (duration 13d 1h).  Predominant underlying rhythm was sinus rhythm, 47 to 125bpm, average 66bpm.  Brief run of atrial tachycardia noted- episode <2 minutes (reported as atrial fibrillation in prelim report).  There were no pauses of greater than 3 seconds.  Rare supraventricular ectopic beats (3%). 76 non sustained SVT runs occurred, the run with the fastest interval lasting 7 beats with a max rate of 197 bpm, the longest lasting 18 beats with an avg rate of 103 bpm  Rare premature ventricular contractions (<1%).  Symptom triggers correlated with sinus rhythm with PACs.  Notes: 1 brief episode of atrial fibrillation lasting 1m 39s (7/29/2024 around 3:07pm) correlating with symptoms.  A few other symptom triggers correlated to normal rhythm with premature atrial contractions.    9/2021 MCT (independently reviewed)  Predominant rhythm sinus average 70 bpm.   Paroxysmal AF present, single episode lasting 2.5 hours, average 97 bpm, range  bpm.   1 episode of SVT x 12 beats up to 167 bpm.   Symptom triggers correlated to AF, sinus rhythm with isolated PAC     9/10/2021 TTE  1.Left ventricular size, wall motion and function are normal. The ejection  fraction is 60-65%, visualy hyperdynamic.  2.TAPSE is normal, which is consistent with normal right ventricular systolic  function.  3.No hemodynamically significant valvular abnormalities on 2D or color flow  imaging.  4.There is no comparison study available       Cc: Matthew Pinon CNP, Ayleen Thrasher, DO Guilherme Coulter MD  10/2/2024  3:05 PM        "

## 2024-10-02 NOTE — PATIENT INSTRUCTIONS
[facility]  Cardiac Electrophysiology  [address]  [place] [address]   Office: [phone]  Fax: [phone]     Thank you for seeing us in clinic today - it is a pleasure to be a part of your care team.  Below is a summary of our plan from today's visit.       You have atrial fibrillation.  We reviewed atrial fibrillation, treatment options (ablation vs antiarrhythmic drug therapy), and long term stroke risk prevention (blood thinner vs Watchman device).    We will plan for the following:  - start sotalol 80mg twice daily in place of metoprolol XL.  We will work to coordinate an ECG 3 days thereafter  - stop metoprolol XL 25mg twice daily  - continue apixaban 5mg twice daily  - consider a Kardia device or Smart watch with ECG capabilities for intermittent rhythm checks     Please do not hesitate to be in touch with our office at [phone] with any questions that may arise.       Thank you for trusting us with your care,    Guilherme Coulter MD  Clinical Cardiac Electrophysiology  [facility]  [address]  [place] [address]   Office: [phone]  Fax: [phone]        ATRIAL FIBRILLATION: Patient Information    What is atrial fibrillation?  Atrial fibrillation (AF, A-fib) is a common heart rhythm problem (arrhythmia) occurring within the upper chambers of the heart (the atria).  In normal rhythm, the upper and lower chambers of the heart are electrically driven to contract in a coordinated sequence.  In atrial fibrillation, the atria lose their ability to contract because of rapid and chaotic electrical activity.  The lower chambers of the heart (the ventricles) continue to pump blood throughout the body, though with irregular and often faster rate due to the chaotic activity within the atria.        How do I know if I have atrial fibrillation?   Some people may feel their heart beating faster, harder, or irregularly  while in atrial fibrillation.  Others may be lightheaded, fatigued, feel weak or tired or become more short of breath especially with activities.  Some patients have no symptoms at all.  Atrial fibrillation may be found due to an irregular pulse or on an electrocardiogram (ECG). Atrial fibrillation can start and stop on its own, and episodes can last from seconds to several months.      How common is atrial fibrillation?   An estimated 3-6 million people in the [place] have atrial fibrillation.  Atrial fibrillation is a common heart rhythm problem for older persons, affecting as estimated 12-15% of people over the age of 65 years of age.    What causes atrial fibrillation?   Age is the most important risk factor for atrial fibrillation.  Atrial fibrillation is more common in people with other heart disease, high blood pressure, diabetes, obesity, sleep apnea and in people who regularly consume alcohol.  Surgery, lung disease, or thyroid problems can lead to atrial fibrillation.  Atrial fibrillation has multiple possible causes, and in most cases a single cause cannot be found.  Atrial fibrillation is a progressive condition, usually starting with at an early stage with short and infrequent episodes.  In later stages of disease, more frequent and longer lasting episodes of atrial fibrillation occur, ultimately culminating in episodes which do not spontaneously terminate.  Generally, more enlargement and scarring within the upper chambers of the heart is observed as atrial fibrillation progresses from early to late-stage disease.    How is atrial fibrillation diagnosed and evaluated?    Because of its start-stop nature, atrial fibrillation can be challenging to diagnose.  Atrial fibrillation is most commonly diagnosed via cardiac rhythm recordings - either an ECG or wearable cardiac rhythm monitor.  For patients with pacemakers, defibrillators or implantable loop recorders, atrial fibrillation may be recorded  via these devices.  Recently, commercially available devices (eg. Apple Watch, RuffWire device, certain FitBit devices, others) can allow patients to take 30 second cardiac rhythm recordings which may document atrial fibrillation.  Once atrial fibrillation is diagnosed, additional tests include blood tests and an echocardiogram.  The echocardiogram uses ultrasound to look at your heart to assess your cardiac function and evaluate for other heart disease.  Additional evaluation may include CT or MRI studies.    Is atrial fibrillation dangerous?   Atrial fibrillation is not usually a life-threatening arrhythmia.  The most serious consequences of atrial fibrillation including stroke and worsening of overall cardiac function.  While in atrial fibrillation, the upper cardiac chambers do not contract normally, resulting in slower blood flow and increased risk of clot formation.  If this blood clot becomes detached from the heart a stroke can occur.  Unfortunately, stroke can be the first sign of atrial fibrillation for some people.  With a stroke, you may notice abnormal sensation, weakness on one side of the body or face, changes in your vision or speech.  If you have any of these signs, you should contact EMS and be evaluated in an emergency room as soon as possible.      How is atrial fibrillation treated?     Several treatment options exist for suppressing atrial fibrillation - however, it is not an easily curable arrhythmia.  The first goal in managing atrial fibrillation is to minimize stroke risk.  The second goal is to improve symptoms associated with atrial fibrillation.  Finally, in patients with reduced cardiac function, maintaining normal rhythm can help improve cardiac function.      Blood thinners are used to reduce the risk of stroke in patients with high estimated stroke risk related to atrial fibrillation.  For patients at higher risk of bleeding related to blood thinner use, implantable devices may be an  option to reduce stroke risk without the need for long term blood thinner use.      Atrial fibrillation can be managed via two strategies: rate control and rhythm control.  In a rate control strategy, continued atrial fibrillation is accepted and medications (eg. beta-blockers or calcium channel blockers) are used to control the lower chamber rate.  In a rhythm control strategy, anti-arrhythmic medications or catheter ablation are used to maintain normal cardiac rhythm and slow disease progression by suppressing atrial fibrillation.  A procedure called a cardioversion, in which an electric shock is delivered through patches placed on the chest wall while under deep sedation, can be performed to temporarily restore normal cardiac rhythm, though does not address the chance of atrial fibrillation recurrence.  Treatments are more effective for earlier rather than later stage atrial fibrillation.  Lifestyle modifications (maintaining a healthy weight, aerobic exercise, diagnosing and treating sleep apnea, and minimizing alcohol intake) are important elements of atrial fibrillation rhythm control.     What is catheter ablation for atrial fibrillation?  Cardiac catheter ablation is a commonly performed, minimally invasive procedure performed by a cardiac electrophysiologist to treat many different cardiac rhythm abnormalities.  During catheter ablation, long, thin, flexible tubes are advanced into the heart via small sheaths inserted into the femoral veins and thermal energy (either heating or cooling) is applied within the heart to disrupt abnormal electrical activity.  Atrial fibrillation ablation is performed under general anesthesia, with procedures generally taking approximately 2-3 hours.  Patients are typically observed for 3-5 hours after the ablation, and in most cases can be discharged home the same day.  Atrial fibrillation ablation is associated with better outcomes (mortality, cardiovascular hospitalizations,  atrial arrhythmia recurrences) compared to antiarrhythmic drug therapy.  However, atrial fibrillation recurrences are not uncommon, and repeat catheter ablation may be offered.  Your electrophysiology team can review atrial fibrillation ablation, anticipated success rates, risks, and recovery expectations with you.    What are anti-arrhythmic medications?  Anti-arrhythmic medications are specialized drugs which alter cardiac electrical functioning to suppress arrhythmias.  There are several anti-arrhythmic medications available, each with its own success rate and side effects.  Some anti-arrhythmic medications are less effective though safer to use, others are more effective though have serious potential toxicities.  Atrial fibrillation recurrences are common and may require dose adjustment or change in antiarrhythmic therapy.  Your electrophysiology team will carefully consider which medication would be the best and safest for your particular case.      Can I live a normal life?    The goal of atrial fibrillation management is for patients to live normal lives without being limited by symptoms related to atrial fibrillation.    Are any additional educational resources available?  There are a number of excellent atrial fibrillation education resources available to you online.  A few options you may wish to review include:  hrsonline.org/guide-atrial-fibrillation  afibmatters.org  getsmartaboutafib.com  stopaf.com    What comes next?    Consider your management options and let us know how we can help in your decision process.  Please take medications as they have been prescribed.  You should also get any tests that may have been ordered for you.      When to Call Your Doctor or seek emergency care:  Call your doctor or seek emergency care if you have any significant changes with the following:  Weakness  Dizziness  Fainting  Fatigue  Shortness of breath  Chest pain with increased activity  If you are concerned that  your heart rate is too fast or too slow  Bleeding that does not stop in 10 minutes  Coughing or throwing up blood  Bloody diarrhea or bleeding hemorrhoids  Dark-colored urine or black stool  Allergic reactions:  Rash  Itching  Swelling  Trouble breathing or swallowing      Please call the [facility] at [phone] if you have concerns about your symptoms, your medicines, or your follow-up appointments.

## 2024-10-02 NOTE — PROGRESS NOTES
Preventive Care Visit  [facility]  Ayleen Castro DO, Internal Medicine  Oct 2, 2024      Assessment & Plan     Encounter for Medicare annual wellness exam    Hyperlipidemia LDL goal <130   - stable, refill provided  - Lipid panel reflex to direct LDL Non-fasting; Future  - rosuvastatin (CRESTOR) 5 MG tablet; Take 1 tablet (5 mg) by mouth daily.  - Lipid panel reflex to direct LDL Non-fasting    Essential hypertension  White coat hypertension.  Better controlled at home.  - losartan-hydrochlorothiazide (HYZAAR) 100-25 MG tablet; Take 1 tablet by mouth daily.  - Basic metabolic panel  (Ca, Cl, CO2, Creat, Gluc, K, Na, BUN); Future    Morbid obesity (H)  Contributes to medical complexity.  Wt loss would help blood pressure conrol    Paroxysmal atrial fibrillation (H)  Follows with cards  - CBC with platelets; Future  - Basic metabolic panel  - CBC with platelets    Osteopenia of necks of both femurs  Due for follow-up DEXA  - DX Bone Density; Future    Patient has been advised of split billing requirements and indicates understanding: Yes          Counseling  Appropriate preventive services were addressed with this patient via screening, questionnaire, or discussion as appropriate for fall prevention, nutrition, physical activity, Tobacco-use cessation, social engagement, weight loss and cognition.  Checklist reviewing preventive services available has been given to the patient.  Reviewed patient's diet, addressing concerns and/or questions.   She is at risk for psychosocial distress and has been provided with information to reduce risk.           Pooja Mendoza is a 74 year old, presenting for the following:  AWV, Hypertension, Lipids, and Health Maintenance (Due for flu covid /No shots )        10/2/2024    11:16 AM   Additional Questions   Roomed by robinson   Accompanied by self         10/2/2024    11:16 AM   Patient Reported Additional Medications   Patient reports taking the  following new medications none         Health Care Directive  Patient has a Health Care Directive on file  Advance care planning document is on file and is current.    HPI      Chief Complaint   Patient presents with    AWV    Hypertension    Lipids    Health Maintenance     Due for flu covid   No shots      Dexa  --dexa in 2017 showed mild osteopenia  --mother had osteoporosis  and was on fosamax    Arthritis    --was told she should have a knee replacement;  is managing OK for now    Hyperlipidemia Follow-Up    Are you regularly taking any medication or supplement to lower your cholesterol?   Yes- am  Are you having muscle aches or other side effects that you think could be caused by your cholesterol lowering medication?  No    Hypertension Follow-up    Do you check your blood pressure regularly outside of the clinic? Yes   Are you following a low salt diet? No  Are your blood pressures ever more than 140 on the top number (systolic) OR more than 90 on the bottom number (diastolic), for example 140/90? No  Blood pressure is better at home - 120s.      BP Readings from Last 6 Encounters:   10/02/24 (!) 142/94   07/24/24 (!) 146/68   05/13/24 124/82   04/23/24 136/88   09/26/23 136/72   02/15/23 138/86           10/2/2024   General Health   How would you rate your overall physical health? Good   Feel stress (tense, anxious, or unable to sleep) Only a little      (!) STRESS CONCERN      10/2/2024   Nutrition   Diet: Regular (no restrictions)            10/2/2024   Exercise   Days per week of moderate/strenous exercise 5 days            10/2/2024   Social Factors   Frequency of gathering with friends or relatives Twice a week   Worry food won't last until get money to buy more No   Food not last or not have enough money for food? No   Do you have housing? (Housing is defined as stable permanent housing and does not include staying ouside in a car, in a tent, in an abandoned building, in an overnight shelter, or  couch-surfing.) Yes   Are you worried about losing your housing? No   Lack of transportation? No   Unable to get utilities (heat,electricity)? No            10/2/2024   Fall Risk   Fallen 2 or more times in the past year? No    No   Trouble with walking or balance? No    No       Multiple values from one day are sorted in reverse-chronological order          10/2/2024   Activities of Daily Living- Home Safety   Needs help with the following daily activites None of the above   Safety concerns in the home None of the above            10/2/2024   Dental   Dentist two times every year? Yes            10/2/2024   Hearing Screening   Hearing concerns? None of the above            10/2/2024   Driving Risk Screening   Patient/family members have concerns about driving No            10/2/2024   General Alertness/Fatigue Screening   Have you been more tired than usual lately? No            10/2/2024   Urinary Incontinence Screening   Bothered by leaking urine in past 6 months No            10/2/2024   TB Screening   Were you born outside of the [place]? No            Today's PHQ-2 Score:       10/2/2024    11:16 AM   PHQ-2 ( 1999 Pfizer)   Q1: Little interest or pleasure in doing things 0   Q2: Feeling down, depressed or hopeless 0   PHQ-2 Score 0   Q1: Little interest or pleasure in doing things Not at all   Q2: Feeling down, depressed or hopeless Not at all   PHQ-2 Score 0           10/2/2024   Substance Use   Alcohol more than 3/day or more than 7/wk Not Applicable   Do you have a current opioid prescription? No   How severe/bad is pain from 1 to 10? 0/10 (No Pain)   Do you use any other substances recreationally? No        Social History     Tobacco Use    Smoking status: Never    Smokeless tobacco: Never   Vaping Use    Vaping status: Never Used   Substance Use Topics    Alcohol use: Never    Drug use: Never           5/30/2024   LAST FHS-7 RESULTS   1st degree relative breast or ovarian cancer No   Any relative bilateral  breast cancer No   Any male have breast cancer No   Any ONE woman have BOTH breast AND ovarian cancer No   Any woman with breast cancer before 50yrs No   2 or more relatives with breast AND/OR ovarian cancer Yes   2 or more relatives with breast AND/OR bowel cancer No           Mammogram Screening - Mammogram every 1-2 years updated in Health Maintenance based on mutual decision making    ASCVD Risk   The 10-year ASCVD risk score (Keisha KAUR, et al., 2019) is: 20.2%    Values used to calculate the score:      Age: 74 years      Sex: Female      Is Non- : No      Diabetic: No      Tobacco smoker: No      Systolic Blood Pressure: 142 mmHg      Is BP treated: Yes      HDL Cholesterol: 77 mg/dL      Total Cholesterol: 190 mg/dL            Reviewed and updated as needed this visit by Provider                    Current Outpatient Medications   Medication Sig Dispense Refill    ELIQUIS ANTICOAGULANT 5 MG tablet TAKE 1 TABLET BY MOUTH 2 TIMES DAILY 180 tablet 1    losartan-hydrochlorothiazide (HYZAAR) 100-25 MG tablet Take 1 tablet by mouth daily 90 tablet 3    metoprolol succinate ER (TOPROL XL) 25 MG 24 hr tablet TAKE 1 TABLET BY MOUTH 2 TIMES DAILY 180 tablet 1    multivitamin, therapeutic (THERA-VIT) TABS tablet Take 1 tablet by mouth daily      rosuvastatin (CRESTOR) 5 MG tablet Take 1 tablet (5 mg) by mouth daily 90 tablet 3    vitamin D3 (CHOLECALCIFEROL) 2000 units (50 mcg) tablet Take 2,000 Units by mouth daily       Current providers sharing in care for this patient include:  Patient Care Team:  Ayleen Castro DO as PCP - General (Internal Medicine)  Ayleen Castro DO as Assigned PCP  Guilherme Coulter MD as Assigned Heart and Vascular Provider    The following health maintenance items are reviewed in Epic and correct as of today:  Health Maintenance   Topic Date Due    BMP  07/21/2024    LIPID  07/21/2024    INFLUENZA VACCINE (1) 09/01/2024    COVID-19 Vaccine  "(7 - 2024-25 season) 09/01/2024    ANNUAL REVIEW OF HM ORDERS  09/26/2024    MEDICARE ANNUAL WELLNESS VISIT  09/26/2024    COLORECTAL CANCER SCREENING  03/16/2025    FALL RISK ASSESSMENT  10/02/2025    MAMMO SCREENING  05/30/2026    GLUCOSE  07/21/2026    ADVANCE CARE PLANNING  09/26/2028    DTAP/TDAP/TD IMMUNIZATION (3 - Td or Tdap) 09/11/2029    DEXA  08/01/2032    HEPATITIS C SCREENING  Completed    PHQ-2 (once per calendar year)  Completed    Pneumococcal Vaccine: 65+ Years  Completed    ZOSTER IMMUNIZATION  Completed    RSV VACCINE  Completed    HPV IMMUNIZATION  Aged Out    MENINGITIS IMMUNIZATION  Aged Out    RSV MONOCLONAL ANTIBODY  Aged Out         Review of Systems  Constitutional, neuro, ENT, endocrine, pulmonary, cardiac, gastrointestinal, genitourinary, musculoskeletal, integument and psychiatric systems are negative, except as otherwise noted.     Objective    Exam  BP (!) 142/94 (BP Location: Left arm, Patient Position: Sitting, Cuff Size: Adult Regular)   Pulse 58   Temp 96.9  F (36.1  C) (Tympanic)   Resp 14   Ht 1.67 m (5' 5.75\")   Wt 99.6 kg (219 lb 9.6 oz)   SpO2 99%   BMI 35.72 kg/m     Estimated body mass index is 35.72 kg/m  as calculated from the following:    Height as of this encounter: 1.67 m (5' 5.75\").    Weight as of this encounter: 99.6 kg (219 lb 9.6 oz).    Physical Exam  GENERAL: alert and no distress  EYES: Eyes grossly normal to inspection, PERRL and conjunctivae and sclerae normal  HENT: ear canals and TM's normal, nose and mouth without ulcers or lesions  NECK: no adenopathy, no asymmetry, masses, or scars  RESP: lungs clear to auscultation - no rales, rhonchi or wheezes  CV: regular rate and rhythm, normal S1 S2, no S3 or S4, no murmur, click or rub, no peripheral edema  ABDOMEN: soft, nontender, no hepatosplenomegaly, no masses and bowel sounds normal  MS: no gross musculoskeletal defects noted, no edema  SKIN: no suspicious lesions or rashes  NEURO: Normal strength and " tone, mentation intact and speech normal  PSYCH: mentation appears normal, affect normal/bright         10/2/2024   Mini Cog   Clock Draw Score 2 Normal   3 Item Recall 3 objects recalled   Mini Cog Total Score 5                 Signed Electronically by: Ayleen Castro DO

## 2024-10-02 NOTE — LETTER
10/2/2024    Ayleen Castro, DO  [address] [facility] [address]    RE: Kelly Mckeon       Dear Colleague,     I had the pleasure of seeing Kelly Mckeon in the [facility].     [facility]  Cardiac Electrophysiology  [address]  [place] [address]   Office: [phone]  Fax: [phone]     Patient: Kelly Mckeon   : 1950       CHIEF COMPLAINT/REASON FOR VISIT  Paroxysmal atrial fibrillation      Assessment/Recommendations     [id number]/Paroxysmal atrial fibrillation - symptomatic with palpitations, current burden unknown  [id number]  We reviewed atrial fibrillation physiology, managing stroke risk, antiarrhythmic drug therapy, and catheter ablation.  We discussed atrial fibrillation ablation procedures, anticipated success rates, the potential need for re-do ablation vs addition of anti-arrhythmic drugs, procedural risks (including groin bleeding, vascular injury, tamponade, phrenic or esophageal injury, stroke, pulmonary vein stenosis) and recovery expectations.  She would prefer initiation of medical therapy as a first step. We will plan for the following:  - start sotalol 80mg twice daily in place of metoprolol XL, ECG 3 days thereafter  - stop metoprolol XL 25mg twice daily  - continue apixaban 5mg twice daily  - consider a Kardia device or Smart watch with ECG capabilities for intermittent rhythm checks  - the longitudinal plan of care was addressed during this visit. Due to the added complexity in care, our team will remain engaged subsequent management of this condition and ongoing continuity of care    Follow up: as above           History of Present Illness   Kelly Mckeon is a 74 year old female with paroxysmal atrial fibrillation, HTN, VIOLET, obesity presenting for follow-up regarding atrial fibrillation.    Mrs. Mckeon's atrial fibrillation history is as summarized below:  Symptoms:  palpitations  Symptom onset date: 2021  Diagnosis date: 2021 (MCT)  Admissions/ER visits: none  Prior medical therapies: no AADs  Prior DCCVs: none  Prior ablations: none  Percutaneous left atrial appendage occlusion: none    She notes intermittent AF episodes, though frequency characterization is unclear - she notes that when she checks her pulse when feeling well, she notes skipped beats/irregular pulse.  She denies chest pain, syncope.  Her  underwent AF and AFL ablation 1/18/2024.  She lives in [place], [place].       Physical Examination  Review of Systems   VITALS: /72 (BP Location: Left arm, Patient Position: Sitting, Cuff Size: Adult Large)   Pulse 72   Wt 100.2 kg (221 lb)   SpO2 97%   BMI 35.94 kg/m    Wt Readings from Last 3 Encounters:   07/24/24 100.7 kg (222 lb)   05/13/24 100.9 kg (222 lb 8 oz)   04/23/24 101.3 kg (223 lb 4.8 oz)     CONSTITUTIONAL: well nourished, comfortable, no distress  EYES:  Conjunctivae pink, sclerae clear.    E/N/T:  Oral mucosa pink  RESPIRATORY:  Respiratory effort is normal  CARDIOVASCULAR:  normal S1 and S2  GASTROINTESTINAL:  Abdomen without masses or tenderness  EXTREMITIES:  No clubbing or cyanosis.    MUSCULOSKELETAL:  Overall grossly normal muscle strength  SKIN:  Overall, skin warm and dry, no lesions.  NEURO/PSYCH:  Oriented x 3 with normal affect.   Constitutional:  No weight loss or loss of appetite    Eyes:  No difficulty with vision, no double vision, no dry eyes  ENT:  No sore throat, difficulty swallowing; changes in hearing or tinnitus  Cardiovascular: As detailed above  Respiratory:  No cough  Musculoskeletal  No joint pain, muscle aches  Neurologic:  No syncope, lightheadedness, fainting spells   Hematologic: No easy bruising, excessive bleeding tendency   Gastrointestinal:  No jaundice, abdominal pain or abdominal bloating  Genitourinary: No changes in urinary habits, no trouble urinating    Psychiatric: No anxiety or depression       Medical History  Surgical History   No past medical history on file. Past Surgical History:   Procedure Laterality Date     AS REPAIR/GRAFT ACHILLES TENDON Right 2011     benign ovarian tumor removal  1999     wisdom teeth       ZZC MOLE REMOVAL (LESIONS) TIER 1           Family History Social History   Family History   Problem Relation Age of Onset     Heart Failure Mother      Emphysema Mother      Melanoma Mother      No Known Problems Father         Social History     Tobacco Use     Smoking status: Never     Smokeless tobacco: Never   Vaping Use     Vaping status: Never Used   Substance Use Topics     Alcohol use: Never     Drug use: Never         Medications  Allergies     Current Outpatient Medications:      ELIQUIS ANTICOAGULANT 5 MG tablet, TAKE 1 TABLET BY MOUTH 2 TIMES DAILY, Disp: 180 tablet, Rfl: 1     losartan-hydrochlorothiazide (HYZAAR) 100-25 MG tablet, Take 1 tablet by mouth daily, Disp: 90 tablet, Rfl: 3     metoprolol succinate ER (TOPROL XL) 25 MG 24 hr tablet, TAKE 1 TABLET BY MOUTH 2 TIMES DAILY, Disp: 180 tablet, Rfl: 1     multivitamin, therapeutic (THERA-VIT) TABS tablet, Take 1 tablet by mouth daily, Disp: , Rfl:      rosuvastatin (CRESTOR) 5 MG tablet, Take 1 tablet (5 mg) by mouth daily, Disp: 90 tablet, Rfl: 3     vitamin D3 (CHOLECALCIFEROL) 2000 units (50 mcg) tablet, Take 2,000 Units by mouth daily, Disp: , Rfl:      Allergies   Allergen Reactions     Lisinopril Cough     Perflutren Lipid Microsphere      Definity dye      Perflutren Lipid Microsphere [Perflutren Lipid Microspheres]      Severe back pain     Seasonal Allergies           Lab Results    Chemistry CBC Cardiac Enzymes/BNP/TSH/INR   Recent Labs   Lab Test 10/02/24  1152      POTASSIUM 3.8   CHLORIDE 104   CO2 28   GLC 91   BUN 17.0   CR 0.93   GFRESTIMATED 65   CONSTANTIN 9.2     Recent Labs   Lab Test 10/02/24  1152 07/21/23  0840 09/15/22  0930   CR 0.93 0.89 0.98*          Recent Labs   Lab Test 10/02/24  1152   WBC  "6.5   HGB 13.7   HCT 42.6   MCV 93        Recent Labs   Lab Test 10/02/24  1152 07/21/23  0840 09/15/22  0930   HGB 13.7 13.1 13.2    No results for input(s): \"TROPONINI\" in the last 05354 hours.  No results for input(s): \"BNP\", \"NTBNPI\", \"NTBNP\" in the last 71949 hours.  Recent Labs   Lab Test 02/02/21  1628   TSH 0.76     No results for input(s): \"INR\" in the last 56133 hours.      Data Review    ECGs (tracings independently reviewed)  2/3/2021 - SR 67bpm    Zio monitoring from 7/27/2024 to 8/9/2024 (duration 13d 1h).  Predominant underlying rhythm was sinus rhythm, 47 to 125bpm, average 66bpm.  Brief run of atrial tachycardia noted- episode <2 minutes (reported as atrial fibrillation in prelim report).  There were no pauses of greater than 3 seconds.  Rare supraventricular ectopic beats (3%). 76 non sustained SVT runs occurred, the run with the fastest interval lasting 7 beats with a max rate of 197 bpm, the longest lasting 18 beats with an avg rate of 103 bpm  Rare premature ventricular contractions (<1%).  Symptom triggers correlated with sinus rhythm with PACs.  Notes: 1 brief episode of atrial fibrillation lasting 1m 39s (7/29/2024 around 3:07pm) correlating with symptoms.  A few other symptom triggers correlated to normal rhythm with premature atrial contractions.    9/2021 MCT (independently reviewed)  Predominant rhythm sinus average 70 bpm.   Paroxysmal AF present, single episode lasting 2.5 hours, average 97 bpm, range  bpm.   1 episode of SVT x 12 beats up to 167 bpm.   Symptom triggers correlated to AF, sinus rhythm with isolated PAC     9/10/2021 TTE  1.Left ventricular size, wall motion and function are normal. The ejection  fraction is 60-65%, visualy hyperdynamic.  2.TAPSE is normal, which is consistent with normal right ventricular systolic  function.  3.No hemodynamically significant valvular abnormalities on 2D or color flow  imaging.  4.There is no comparison study available   "     Cc: Matthew Pinon CNP, Rebecca Sara, DO Guilherme Coulter MD  10/2/2024  3:05 PM          Thank you for allowing me to participate in the care of your patient.      Sincerely,     Guilherme Coulter MD     [facility]  cc:   No referring provider defined for this encounter.

## 2024-10-03 ENCOUNTER — TELEPHONE (OUTPATIENT)
Dept: CARDIOLOGY | Facility: CLINIC | Age: 74
End: 2024-10-03
Payer: COMMERCIAL

## 2024-10-03 DIAGNOSIS — I48.0 PAROXYSMAL ATRIAL FIBRILLATION (H): Primary | ICD-10-CM

## 2024-10-03 NOTE — TELEPHONE ENCOUNTER
----- Message from Guilherme Coulter sent at 10/2/2024  3:20 PM CDT -----  Hi,    Starting Mrs. Mckeon on sotalol - can we help coordinate an ECG, ideally in [place] (possible at [facility] clinic there?) over the next 3-4 days?    TY!  Bret

## 2024-10-03 NOTE — TELEPHONE ENCOUNTER
Noted.  Phone call to patient, she has not picked up Sotalol or started yet, but it is ready at her pharmacy.  Requested that she start Sotalol on Saturday 10-5-24 and schedule an EKG at the [facility] clinic on Monday 10-7-24.  Order for EKG placed and she states understanding, reviewed contact information for additional questions or concerns and note postponed to Monday afternoon for EKG review.

## 2024-10-07 ENCOUNTER — HOSPITAL ENCOUNTER (OUTPATIENT)
Dept: CARDIOLOGY | Facility: CLINIC | Age: 74
Discharge: HOME | End: 2024-10-07
Attending: INTERNAL MEDICINE | Admitting: INTERNAL MEDICINE
Payer: COMMERCIAL

## 2024-10-07 DIAGNOSIS — I48.0 PAROXYSMAL ATRIAL FIBRILLATION (H): ICD-10-CM

## 2024-10-07 PROCEDURE — 93005 ELECTROCARDIOGRAM TRACING: CPT | Performed by: CLINICAL EXERCISE PHYSIOLOGIST

## 2024-10-07 PROCEDURE — 93005 ELECTROCARDIOGRAM TRACING: CPT | Performed by: INTERNAL MEDICINE

## 2024-10-07 NOTE — TELEPHONE ENCOUNTER
EKG noted.  SR @68,  and Qtc 426.  Results noted in Epic.    Phone call to patient, she states she has stopped Metoprolol and started Sotalol.  She has not noted any side effects since starting except for a lower heart rate at rest, now in the 50's.  She denies any dizziness or lightheadedness.  Encouraged her to call with any changes, answered all questions and reviewed contact information.  Pt to continue current medications.

## 2024-10-08 ENCOUNTER — HOSPITAL ENCOUNTER (OUTPATIENT)
Dept: BONE DENSITY | Facility: CLINIC | Age: 74
Discharge: HOME | End: 2024-10-08
Attending: INTERNAL MEDICINE | Admitting: INTERNAL MEDICINE
Payer: COMMERCIAL

## 2024-10-08 DIAGNOSIS — M85.851 OSTEOPENIA OF NECKS OF BOTH FEMURS: ICD-10-CM

## 2024-10-08 DIAGNOSIS — M85.852 OSTEOPENIA OF NECKS OF BOTH FEMURS: ICD-10-CM

## 2024-10-08 PROCEDURE — 77080 DXA BONE DENSITY AXIAL: CPT

## 2024-10-18 DIAGNOSIS — I48.0 PAROXYSMAL ATRIAL FIBRILLATION (H): ICD-10-CM

## 2024-10-18 RX ORDER — APIXABAN 5 MG/1
TABLET, FILM COATED ORAL
Qty: 180 TABLET | Refills: 3 | Status: SHIPPED | OUTPATIENT
Start: 2024-10-18

## 2024-10-29 ENCOUNTER — OFFICE VISIT (OUTPATIENT)
Dept: SLEEP MEDICINE | Facility: CLINIC | Age: 74
End: 2024-10-29
Attending: INTERNAL MEDICINE
Payer: COMMERCIAL

## 2024-10-29 VITALS
SYSTOLIC BLOOD PRESSURE: 138 MMHG | DIASTOLIC BLOOD PRESSURE: 87 MMHG | OXYGEN SATURATION: 97 % | WEIGHT: 217 LBS | HEART RATE: 56 BPM | HEIGHT: 66 IN | BODY MASS INDEX: 34.87 KG/M2

## 2024-10-29 DIAGNOSIS — G47.33 OSA (OBSTRUCTIVE SLEEP APNEA): Primary | ICD-10-CM

## 2024-10-29 DIAGNOSIS — I48.0 PAROXYSMAL ATRIAL FIBRILLATION (H): ICD-10-CM

## 2024-10-29 PROCEDURE — 99215 OFFICE O/P EST HI 40 MIN: CPT

## 2024-10-29 ASSESSMENT — SLEEP AND FATIGUE QUESTIONNAIRES
HOW LIKELY ARE YOU TO NOD OFF OR FALL ASLEEP WHILE LYING DOWN TO REST IN THE AFTERNOON WHEN CIRCUMSTANCES PERMIT: WOULD NEVER DOZE
HOW LIKELY ARE YOU TO NOD OFF OR FALL ASLEEP WHEN YOU ARE A PASSENGER IN A CAR FOR AN HOUR WITHOUT A BREAK: WOULD NEVER DOZE
HOW LIKELY ARE YOU TO NOD OFF OR FALL ASLEEP WHILE WATCHING TV: SLIGHT CHANCE OF DOZING
HOW LIKELY ARE YOU TO NOD OFF OR FALL ASLEEP WHILE SITTING AND TALKING TO SOMEONE: WOULD NEVER DOZE
HOW LIKELY ARE YOU TO NOD OFF OR FALL ASLEEP WHILE SITTING AND READING: SLIGHT CHANCE OF DOZING
HOW LIKELY ARE YOU TO NOD OFF OR FALL ASLEEP WHILE SITTING QUIETLY AFTER LUNCH WITHOUT ALCOHOL: SLIGHT CHANCE OF DOZING
HOW LIKELY ARE YOU TO NOD OFF OR FALL ASLEEP IN A CAR, WHILE STOPPED FOR A FEW MINUTES IN TRAFFIC: WOULD NEVER DOZE
HOW LIKELY ARE YOU TO NOD OFF OR FALL ASLEEP WHILE SITTING INACTIVE IN A PUBLIC PLACE: WOULD NEVER DOZE

## 2024-10-29 NOTE — NURSING NOTE
"Chief Complaint   Patient presents with    Sleep Problem     Referred by cardiologist.        Initial /87   Pulse 56   Ht 1.67 m (5' 5.75\")   Wt 98.4 kg (217 lb)   SpO2 97%   BMI 35.29 kg/m   Estimated body mass index is 35.29 kg/m  as calculated from the following:    Height as of this encounter: 1.67 m (5' 5.75\").    Weight as of this encounter: 98.4 kg (217 lb).    Medication Reconciliation: complete    Neck circumference: 15 inches / 38 centimeters.    Hyun Govea CMA on 10/29/2024 at 10:47 AM       "

## 2024-10-29 NOTE — PROGRESS NOTES
Outpatient Sleep Medicine Consultation:      Name: Kelly Mckeon MRN# [id number]   Age: 74 year old YOB: 1950     Date of Consultation: October 29, 2024  Consultation is requested by: Grecia Rivera MD  [address]  [place] [address] Grecia Rivera  Primary care provider: Ayleen Castro       Reason for Sleep Consult:     Kelly Mckeon is sent by Grecia Rivera for a sleep consultation regarding previously diagnosed VIOLET and atrial fibrillation.    Patient s Reason for visit  Kelly Mckeon main reason for visit: (Proxy-Rptd) referral  Patient states problem(s) started: (Proxy-Rptd) na  Kelly Mckeon's goals for this visit: (Proxy-Rptd) complete           Assessment and Plan:     Summary Sleep Diagnoses:  (G47.33) VIOLET (obstructive sleep apnea) (primary encounter diagnosis) (I48.0) Paroxysmal atrial fibrillation (H)  Comment: Kelly is a 74-year-old female who is sent to the sleep clinic by her cardiologist for a sleep evaluation in the setting of atrial fibrillation and previously diagnosed mild VIOLET. Kelly was previously seen in our clinic in 2021 to assess for apnea in the setting of atrial fibrillation x1 year. Her home sleep study showed mild obstructive sleep apnea without hypoxemia and she deferred treatment at that time. Kelly has lost 10 lbs since the night of her sleep study. Today her only sleep complaint is waking to use the bathroom. She can wake 1-3 times per night. She does not think she is snoring at this time. No snort/gasp arousals. Kelly feels rested in the morning. She has pretty good energy, but she can inadvertently doze while reading later on in the day which she partially attributes to her age. Kelly's STOP-BANG is 4/8- tired, HTN, BMI > 35 (35.29), and age > 50 (74).  Plan: Kelly is at intermediate risk for obstructive sleep apnea. Previous study in 2022 showed mild apnea without sleep associated hypoxemia. She is  down 10 lbs since this study so it is possible she does not have any significant apnea. Recommended a repeat sleep study; however, Kelly politely declined at this time. She would like to continue to work on weight loss and reevaluate her symptoms in 1 year. We reviewed treatment options for obstructive sleep apnea including PAP therapy, mandibular advancement device, positional therapy, and weight management. Kelly does express some interest in an oral appliance, and she is going to discuss this therapy with her dentist. She was also shown the Sleep Noodle. We reviewed that untreated sleep apnea increases her risk for HTN, heart disease, stroke, diabetes, and irregular heart rhythms.     Comorbid Diagnoses:  Obesity, paroxysmal atrial fibrillation, HTN    Summary Recommendations:  No orders of the defined types were placed in this encounter.    Summary Counseling:    Sleep Testing Reviewed  Obstructive Sleep Apnea Reviewed  Complications of Untreated Sleep Apnea Reviewed    Patient will follow up in about 1 year.   ASHOK Le CNP    Total time spent reviewing medical records, history and physical examination, review of previous testing and interpretation as well as documentation on this date: 44 minutes     CC: Grecia Rivera MD           History of Present Illness:     Kelly is sent to the sleep clinic by her cardiologist for a sleep evaluation in the setting of atrial fibrillation and previously diagnosed mild VIOLET. Kelly was previously seen in our clinic in 2021 to assess for apnea in the setting of atrial fibrillation x1 year. Her home sleep study showed mild obstructive sleep apnea without hypoxemia and she deferred treatment at that time. Kelly has lost 10 lbs since the night of her sleep study. Today her only sleep complaint is waking to use the bathroom. She can wake 1-3 times per night. She does not think she is snoring at this time. No snort/gasp arousals. Kelly feels rested in the  morning. She has pretty good energy, but she can inadvertently doze while reading later on in the day.       Past Sleep Evaluations: HST on 02/09/2022 at 227 lbs.- AHI 6.9 events per hour, supine AHI 6.5 events per hour, left side 23.9 events per hour, and right side 4.3 events per hour. No sleep associated hypoxemia.     SLEEP-WAKE SCHEDULE:     Work/School Days: Patient goes to school/work: (Proxy-Rptd) No   Usually gets into bed at (Proxy-Rptd) 1030  Takes patient about (Proxy-Rptd) 10 to fall asleep  Has trouble falling asleep (Proxy-Rptd) 0 nights per week  Wakes up in the middle of the night (Proxy-Rptd) 2 times.  Wakes up due to (Proxy-Rptd) Use the bathroom x 1-3   She has trouble falling back asleep (Proxy-Rptd) 0 times a week.   It usually takes   to get back to sleep  Patient is usually up at (Proxy-Rptd) 630  Uses alarm: (Proxy-Rptd) No    Weekends/Non-work Days/All Other Days:  Usually gets into bed at (Proxy-Rptd) 1030   Takes patient about (Proxy-Rptd) 10 to fall asleep  Patient is usually up at (Proxy-Rptd) 630  Uses alarm: (Proxy-Rptd) No    Sleep Need  Patient gets (Proxy-Rptd) 8 sleep on average   Patient thinks she needs about (Proxy-Rptd) 8 sleep    Kelly Mckeon prefers to sleep in this position(s): (Proxy-Rptd) Back They have a Sleep Number bed, and she sleeps in the zero gravity position.   Patient states they do the following activities in bed:      Naps  Patient takes a purposeful nap (Proxy-Rptd) 0 times a week and naps are usually   in duration  She feels better after a nap:    She dozes off unintentionally (Proxy-Rptd) 1 days per week  Patient has had a driving accident or near-miss due to sleepiness/drowsiness: (Proxy-Rptd) No    SLEEP DISRUPTIONS:    Breathing/Snoring  Patient snores: (Proxy-Rptd) No  Other people complain about her snoring: (Proxy-Rptd) No  Patient has been told she stops breathing in her sleep: (Proxy-Rptd) No  She has issues with the following: (Proxy-Rptd)  Getting up to urinate more than once Denies morning headaches.     Movement:  Patient gets pain, discomfort, with an urge to move: (Proxy-Rptd) No Denies restless legs.  It happens when she is resting: (Proxy-Rptd) No  It happens more at night:     Patient has been told she kicks her legs at night:        Behaviors in Sleep:  Kelly Mckeon has experienced the following behaviors while sleeping:    She has experienced sudden muscle weakness during the day: (Proxy-Rptd) No  Pt denies bruxism, sleep talking, sleep walking, and dream enactment behavior. Pt denies sleep paralysis, hypnagogue and cataplexy.    Is there anything else you would like your sleep provider to know:      CAFFEINE AND OTHER SUBSTANCES:    Patient consumes caffeinated beverages per day: (Proxy-Rptd) 0  Last caffeine use is usually:    List of any prescribed or over the counter stimulants that patient takes: None  List of any prescribed or over the counter sleep medication patient takes: None  List of previous sleep medications that patient has tried:    Patient drinks alcohol to help them sleep: (Proxy-Rptd) No  Patient drinks alcohol near bedtime: (Proxy-Rptd) No    Family History:  Patient has a family member been diagnosed with a sleep disorder: (Proxy-Rptd) No      Social History: She lives at home with her .     SCALES:    EPWORTH SLEEPINESS SCALE         10/29/2024    10:39 AM    [place] Sleepiness Scale ( CAROLIN Heath  [id number]<br>ESS - [place]/English - Final version - 21 Nov 07 - [facility] [place].)   Sitting and reading Slight chance of dozing   Watching TV Slight chance of dozing   Sitting, inactive in a public place (e.g. a theatre or a meeting) Would never doze   As a passenger in a car for an hour without a break Would never doze   Lying down to rest in the afternoon when circumstances permit Would never doze   Sitting and talking to someone Would never doze   Sitting quietly after a lunch without alcohol Slight chance  of dozing   In a car, while stopped for a few minutes in traffic Would never doze   [place] Score (MC) 3   [place] Score (Sleep) 3        Patient-reported         INSOMNIA SEVERITY INDEX (ENRICO)          10/29/2024    10:32 AM   Insomnia Severity Index (ENRICO)   Difficulty falling asleep 1    Difficulty staying asleep 1    Problems waking up too early 1    How SATISFIED/DISSATISFIED are you with your CURRENT sleep pattern? 1    How NOTICEABLE to others do you think your sleep problem is in terms of impairing the quality of your life? 0    How WORRIED/DISTRESSED are you about your current sleep problem? 0    To what extent do you consider your sleep problem to INTERFERE with your daily functioning (e.g. daytime fatigue, mood, ability to function at work/daily chores, concentration, memory, mood, etc.) CURRENTLY? 0    ENRICO Total Score 4        Patient-reported       Guidelines for Scoring/Interpretation:  Total score categories:  0-7 = No clinically significant insomnia   8-14 = Subthreshold insomnia   15-21 = Clinical insomnia (moderate severity)  22-28 = Clinical insomnia (severe)  Used via courtesy of www.[place]Bound[place].va.gov with permission from Rajiv Nicholas PhD., [facility]      STOP BANG         10/29/2024    10:40 AM   STOP BANG Questionnaire (  2008, the American Society of Anesthesiologists, Inc. [employer]   1. Snoring - Do you snore loudly (louder than talking or loud enough to be heard through closed doors)? No   2. Tired - Do you often feel tired, fatigued, or sleepy during daytime? Yes   3. Observed - Has anyone observed you stop breathing during your sleep? No   4. Blood pressure - Do you have or are you being treated for high blood pressure? Yes   5. BMI - BMI more than 35 kg/m2? Yes   6. Age - Age over 50 yr old? Yes   7. Neck circumference - Neck circumference greater than 40 cm? No   8. Gender - Gender male? No   STOP BANG Score (MC): 5 (High risk of VIOLET)         GAD7       "   No data to display                  CAGE-AID         No data to display                CAGE-AID reprinted with permission from the [place] Medical Journal, VALORIE Gonzalez. and JACKLYN Cifuentes, \"Conjoint screening questionnaires for alcohol and drug abuse\" [place] Medical Journal 94: 135-140, 1995.      PATIENT HEALTH QUESTIONNAIRE-9 (PHQ - 9)         No data to display                Developed by Savana Momin, Nadia Christie, Augusto Gupta and colleagues, with an educational tigre from [employer] No permission required to reproduce, translate, display or distribute.        Allergies:    Allergies   Allergen Reactions    Lisinopril Cough    Perflutren Lipid Microsphere      Definity dye     Perflutren Lipid Microsphere [Perflutren Lipid Microspheres]      Severe back pain    Seasonal Allergies        Medications:    Current Outpatient Medications   Medication Sig Dispense Refill    apixaban ANTICOAGULANT (ELIQUIS ANTICOAGULANT) 5 MG tablet TAKE 1 TABLET BY MOUTH 2 TIMES DAILY 180 tablet 3    losartan-hydrochlorothiazide (HYZAAR) 100-25 MG tablet Take 1 tablet by mouth daily. 90 tablet 3    multivitamin, therapeutic (THERA-VIT) TABS tablet Take 1 tablet by mouth daily      rosuvastatin (CRESTOR) 5 MG tablet Take 1 tablet (5 mg) by mouth daily. 90 tablet 3    sotalol (BETAPACE) 80 MG tablet Take 1 tablet (80 mg) by mouth 2 times daily. 180 tablet 3       Problem List:  Patient Active Problem List    Diagnosis Date Noted    Osteopenia of necks of both femurs 10/02/2024     Priority: Medium     Seen [facility] 2017      VIOLET (obstructive sleep apnea) 04/23/2024     Priority: Medium    Paroxysmal atrial fibrillation (H) 01/27/2022     Priority: Medium    Obesity (BMI 35.0-39.9) with comorbidity (H) 09/10/2019     Priority: Medium    Peripheral sensory neuropathy 06/28/2017     Priority: Medium    Isabel's cyst 01/25/2015     Priority: Medium    Hyperlipidemia LDL goal <130 02/24/2011     Priority: Medium     " Intolerant to pravastatin; tolerating rosuvastatin      Vitamin D deficiency 02/24/2011     Priority: Medium    Essential hypertension 01/12/2010     Priority: Medium     Degree of white coat hypertension, home blood pressure is better controlled (10/24)      Personal history of colon polyps, unspecified 09/06/2006     Priority: Medium     Due for colonoscopy in 2020          Past Medical/Surgical History:  Past Medical History:   Diagnosis Date    Hypertension     Paroxysmal atrial fibrillation (H)      Past Surgical History:   Procedure Laterality Date    AS REPAIR/GRAFT ACHILLES TENDON Right 2011    benign ovarian tumor removal  1999    wisdom teeth      ZZC MOLE REMOVAL (LESIONS) TIER 1         Social History:  Social History     Socioeconomic History    Marital status:      Spouse name: Not on file    Number of children: Not on file    Years of education: Not on file    Highest education level: Not on file   Occupational History    Not on file   Tobacco Use    Smoking status: Never    Smokeless tobacco: Never   Vaping Use    Vaping status: Never Used   Substance and Sexual Activity    Alcohol use: Never    Drug use: Never    Sexual activity: Yes     Partners: Male   Other Topics Concern    Not on file   Social History Narrative    Not on file     Social Drivers of Health     Financial Resource Strain: Low Risk  (10/2/2024)    Financial Resource Strain     Within the past 12 months, have you or your family members you live with been unable to get utilities (heat, electricity) when it was really needed?: No   Food Insecurity: Low Risk  (10/2/2024)    Food Insecurity     Within the past 12 months, did you worry that your food would run out before you got money to buy more?: No     Within the past 12 months, did the food you bought just not last and you didn t have money to get more?: No   Transportation Needs: Low Risk  (10/2/2024)    Transportation Needs     Within the past 12 months, has lack of  transportation kept you from medical appointments, getting your medicines, non-medical meetings or appointments, work, or from getting things that you need?: No   Physical Activity: Unknown (10/2/2024)    Exercise Vital Sign     Days of Exercise per Week: 5 days     Minutes of Exercise per Session: Not on file   Stress: No Stress Concern Present (10/2/2024)    [employer] [place] [employer] - Occupational Stress Questionnaire     Feeling of Stress : Only a little   Social Connections: Unknown (10/2/2024)    Social Connection and Isolation Panel [NHANES]     Frequency of Communication with Friends and Family: Not on file     Frequency of Social Gatherings with Friends and Family: Twice a week     Attends Latter day Services: Not on file     Active Member of Clubs or Organizations: Not on file     Attends Club or Organization Meetings: Not on file     Marital Status: Not on file   Interpersonal Safety: Low Risk  (10/2/2024)    Interpersonal Safety     Do you feel physically and emotionally safe where you currently live?: Yes     Within the past 12 months, have you been hit, slapped, kicked or otherwise physically hurt by someone?: No     Within the past 12 months, have you been humiliated or emotionally abused in other ways by your partner or ex-partner?: No   Housing Stability: Low Risk  (10/2/2024)    Housing Stability     Do you have housing? : Yes     Are you worried about losing your housing?: No       Family History:  Family History   Problem Relation Age of Onset    Heart Failure Mother     Emphysema Mother     Melanoma Mother     No Known Problems Father        Review of Systems:  A complete review of systems reviewed by me is negative with the exeption of what has been mentioned in the history of present illness.  In the last TWO WEEKS have you experienced any of the following symptoms?  Fevers: (Proxy-Rptd) No  Night Sweats: (Proxy-Rptd) No  Weight Gain: (Proxy-Rptd) No  Pain at Night: (Proxy-Rptd)  "No  Double Vision: (Proxy-Rptd) No  Changes in Vision: (Proxy-Rptd) No  Difficulty Breathing through Nose: (Proxy-Rptd) No  Sore Throat in Morning: (Proxy-Rptd) No  Dry Mouth in the Morning: (Proxy-Rptd) No  Shortness of Breath Lying Flat: (Proxy-Rptd) No  Shortness of Breath With Activity: (Proxy-Rptd) No  Awakening with Shortness of Breath: (Proxy-Rptd) No  Increased Cough: (Proxy-Rptd) No  Heart Racing at Night: (Proxy-Rptd) No  Swelling in Feet or Legs: (Proxy-Rptd) No  Diarrhea at Night: (Proxy-Rptd) No  Heartburn at Night: (Proxy-Rptd) No  Urinating More than Once at Night: (Proxy-Rptd) Yes  Losing Control of Urine at Night: (Proxy-Rptd) No  Joint Pains at Night: (Proxy-Rptd) No  Headaches in Morning: (Proxy-Rptd) No  Weakness in Arms or Legs: (Proxy-Rptd) No  Depressed Mood: (Proxy-Rptd) No  Anxiety: (Proxy-Rptd) No     Physical Examination:  Vitals: /87   Pulse 56   Ht 1.67 m (5' 5.75\")   Wt 98.4 kg (217 lb)   SpO2 97%   BMI 35.29 kg/m    BMI= Body mass index is 35.29 kg/m .    Neck Cir (cm): 38 cm    GENERAL APPEARANCE: healthy, alert, no distress, and cooperative  EYES: Eyes grossly normal to inspection, PERRL, and conjunctivae and sclerae normal  NECK: no asymmetry, masses, or scars  RESP: no increased work of breathing noted, no audible cough or wheeze   NEURO: mentation intact and speech normal  PSYCH: mentation appears normal and affect normal/bright         Data: All pertinent previous laboratory data reviewed     Recent Labs   Lab Test 10/02/24  1152 07/21/23  0840    141   POTASSIUM 3.8 3.7   CHLORIDE 104 106   CO2 28 26   ANIONGAP 10 9   GLC 91 93   BUN 17.0 16.2   CR 0.93 0.89   CONSTANTIN 9.2 9.3       Recent Labs   Lab Test 10/02/24  1152   WBC 6.5   RBC 4.60   HGB 13.7   HCT 42.6   MCV 93   MCH 29.8   MCHC 32.2   RDW 12.2          Recent Labs   Lab Test 06/14/18  0000   AST 19   ALT 16       TSH (mU/L)   Date Value   02/02/2021 0.76       No results found for: \"UAMP\", \"UBARB\", " "\"BENZODIAZEUR\", \"UCANN\", \"UCOC\", \"OPIT\", \"UPCP\"    No results found for: \"IRONSAT\", \"GW30145\", \"DAVIDA\"    No results found for: \"PH\", \"PHARTERIAL\", \"PO2\", \"TK1LYBSAUYJ\", \"SAT\", \"PCO2\", \"HCO3\", \"BASEEXCESS\", \"TERESA\", \"BEB\"    @LABRCNTIPR(phv:4,pco2v:4,po2v:4,hco3v:4,sylvia:4,o2per:4)@    Echocardiology: No results found for this or any previous visit (from the past 4320 hours).    Chest x-ray:   No results found for this or any previous visit from the past 365 days.      Chest CT:   No results found for this or any previous visit from the past 365 days.      PFT: Most Recent [facility]ze Pulmonary Function Testing    Jade Winters, ASHOK CNP 10/29/2024   "

## 2024-11-26 ENCOUNTER — TRANSFERRED RECORDS (OUTPATIENT)
Dept: HEALTH INFORMATION MANAGEMENT | Facility: CLINIC | Age: 74
End: 2024-11-26
Payer: COMMERCIAL

## 2024-12-26 ENCOUNTER — TELEPHONE (OUTPATIENT)
Dept: CARDIOLOGY | Facility: CLINIC | Age: 74
End: 2024-12-26
Payer: COMMERCIAL

## 2024-12-26 DIAGNOSIS — I48.0 PAROXYSMAL ATRIAL FIBRILLATION (H): Primary | ICD-10-CM

## 2024-12-26 NOTE — TELEPHONE ENCOUNTER
Pt call reporting increase in frequency of AF  Pt reports  this has been happening or the last few weeks, duration was only 15mins  in the mornings at first but now can last for up to 1 hour. She has had sleep eval done, and does not hae sleep apnea, plan to follow up in 1 year with Sleep medicine per pt.  Pt reports experiencing symptoms during episode(s), but are currently tolerable at this time  Pt reports symptoms as palpitations  Pt denies any recent changes or triggers leading to episode(s)  Pts HRs during the episode(s)  HR 95 bpm when in AFIB, 50bpm when in SR   Pts current BP  BP today 130/82  Pt confirmed by smart watch    Problem History Diagnosis: paroxysmal AF  [id number] VASC Score: 3  Pertinent past surgical/medical history: None  Next Clinic Apt or Planned follow up apt: Not planned or scheduled yet.  Medication: Pts medication list and allergy list is current and listed below for reference   Anticoagulation: Pt currently taking Eliquis as prescribed below, missed 1 dose about 3 weeks ago, exact date unknown  Antiarrhythmic Medications: Sotalol as prescribed below  Instructions/Information reviewed with patient: AF/AFL and stroke education provided to pt, reassurance was given to pt of non-life threatening arrhythmia, reviewed when to seek care through the ER/911, instructed to continue current medication as prescribed, continue to monitor s/s of AF/FDC, call if symptoms persist or worsen, call with sustained episodes or an increase in frequency/duration of episodes, to contact the EP nursing office if conversion back to SR, information would be sent to provider for review and would be contacted with further recommendations, contact information provided to pt, and pt verbalized understanding    Dicussed it may be best to be seen in clinic to discuss medication change options or next steps with possible ablation.  PT agrees and will await call from scheduling.     Pt will call if her episodes become  more bothersome and not tolerable.    Please advise  Thank you,  Guadalupe Vanegas RN  12/26/2024 2:06 PM    Allergies   Allergen Reactions    Lisinopril Cough    Perflutren Lipid Microsphere      Definity dye     Perflutren Lipid Microsphere [Perflutren Lipid Microspheres]      Severe back pain    Seasonal Allergies        Current Outpatient Medications:     apixaban ANTICOAGULANT (ELIQUIS ANTICOAGULANT) 5 MG tablet, TAKE 1 TABLET BY MOUTH 2 TIMES DAILY, Disp: 180 tablet, Rfl: 3    losartan-hydrochlorothiazide (HYZAAR) 100-25 MG tablet, Take 1 tablet by mouth daily., Disp: 90 tablet, Rfl: 3    multivitamin, therapeutic (THERA-VIT) TABS tablet, Take 1 tablet by mouth daily, Disp: , Rfl:     rosuvastatin (CRESTOR) 5 MG tablet, Take 1 tablet (5 mg) by mouth daily., Disp: 90 tablet, Rfl: 3    sotalol (BETAPACE) 80 MG tablet, Take 1 tablet (80 mg) by mouth 2 times daily., Disp: 180 tablet, Rfl: 3

## 2025-01-14 ENCOUNTER — TELEPHONE (OUTPATIENT)
Dept: FAMILY MEDICINE | Facility: CLINIC | Age: 75
End: 2025-01-14
Payer: COMMERCIAL

## 2025-01-14 NOTE — TELEPHONE ENCOUNTER
General Call      Reason for Call:  Pt returned call to clinic re FIT test. Verified address in chart is correct. Pt will expect FIT test to be mailed to her. Please call pt if there are any other questions or concerns.    Could we send this information to you in [employer] or would you prefer to receive a phone call?:   Patient would prefer a phone call   Okay to leave a detailed message?: Yes at Home number on file [phone] (home)

## 2025-01-20 NOTE — PROGRESS NOTES
[facility] Heart Care  Cardiac Electrophysiology  [address]  [place] [address]   Office: [phone]  Fax: [phone]     HEART CARE ELECTROPHYSIOLOGY FOLLOW UP    Primary Care: Ayleen Castro MD      Assessment/Recommendations     Paroxysmal atrial fibrillation/stage 3A: Diagnosed 2021, symptomatic with tachypalpitations which have historically also correlated to sinus rhythm with atrial ectopy. She is hesitant to increase sotalol due to side effects and would prefer catheter ablation. We discussed ablation indications, procedure and risks, recovery, expected prognosis, and pulsed field ablation. We also discussed role of risk factor modification including hypertension and obesity as it pertains to long term arrhythmia management    [id number] score of 3 for age 65-74, gender, HTN; HAS BLED 1 for age >65. She denies missed doses or bleeding issues    HTN: elevated today, historically controlled     Plan:  Continue Eliquis 5 mg twice a day for stroke prophylaxis  Continue sotalol 80 mg twice a day     History of Present Illness/Subjective    Kelly Mckeon is a 74 year old female with past medical history significant for paroxysmal AF, HTN, dyslipidemia, obesity, seen today with her  for EP follow-up.  She underwent workup for intermittent palpitations and was diagnosed with paroxysmal AF by MCT in 2021, consisting of a single episode lasting around 2.5 hours.  Symptoms were initially well-controlled on metoprolol.  Ambulatory monitoring 7-8/2024 showed AF burden of <1% and paroxysms of SVT, with symptoms associated with sinus rhythm with atrial ectopy.  She was started on sotalol 80 mg twice a day October 2024.    Kelly reports initial sensation that something wasn't quite right after starting sotalol associated with heart rates in the 40s and low 50s which has since improved. Her heart rates trend 50-60s at home. She has no lightheadedness, dizziness,  presyncope or syncope. She has been keeping a log of atrial fibrillation episodes in conjunction with smart watch EKG tracings, noting symptoms primarily occur when she is lying in bed. They were more frequent in December, occurring multiple times per week. Recent tracings show sinus rhythm with atrial ectopy, She had a previous sleep study showing mild sleep apnea in 2022 and has since lost 10 pounds; repeating sleep study was recently discussed which she deferred.  She denies chest discomfort, dyspnea on exertion, pedal edema or orthopnea.     Data Review     Arrhythmia hx  Dx/date: Paroxysmal AF 2021 by MCT  Sx: Tachypalpitations, also associated with PACs   [id number]/OAC: 3 for age 65-74, gender, HTN  OAC: apixaban  Rate control: Metoprolol  AAD: sotalol 10/2/2024-present  DCCV: None  Ablation: None    EKG 10/7/2024: SR 65 bpm, QRS 93 ms, QT/QTc 418/426 ms   2/2/2021: SR 67 bpm  Personally reviewed.     TTE 2021  1.Left ventricular size, wall motion and function are normal. The ejection  fraction is 60-65%, visualy hyperdynamic.  2.TAPSE is normal, which is consistent with normal right ventricular systolic  function.  3.No hemodynamically significant valvular abnormalities on 2D or color flow  imaging.  4.There is no comparison study available.    Zio monitoring from 7/27/2024 to 8/9/2024 (duration 13d 1h).  Predominant underlying rhythm was sinus rhythm, 47 to 125bpm, average 66bpm.  Brief run of atrial tachycardia noted- episode <2 minutes (reported as atrial fibrillation in prelim report).  There were no pauses of greater than 3 seconds.  Rare supraventricular ectopic beats (3%). 76 non sustained SVT runs occurred, the run with the fastest interval lasting 7 beats with a max rate of 197 bpm, the longest lasting 18 beats with an avg rate of 103 bpm  Rare premature ventricular contractions (<1%).  Symptom triggers correlated with sinus rhythm with PACs.    MCT x 25 days beginning 9/10/2021.  Predominant  "rhythm sinus average 70 bpm.  Paroxysmal AF present, single episode lasting 2.5 hours, average 97 bpm, range  bpm.  1 episode of SVT x 12 beats up to 167 bpm.  Symptom triggers correlated to AF, sinus rhythm with isolated PAC    I have reviewed and updated the patient's past medical history, allergy list and medication list.          Physical Examination   Vitals: BP (!) 174/74 (BP Location: Right arm, Patient Position: Sitting, Cuff Size: Adult Large)   Pulse 68   Resp 12   Ht 1.676 m (5' 6\")   Wt 101.6 kg (224 lb)   BMI 36.15 kg/m      BMI= Body mass index is 36.15 kg/m .    Wt Readings from Last 3 Encounters:   01/21/25 101.6 kg (224 lb)   10/29/24 98.4 kg (217 lb)   10/02/24 100.2 kg (221 lb)       General   Appearance:   Alert and oriented, no acute distress   HEENT:  Normocephalic and atraumatic   Neck: No JVP, carotid bruit or obvious thyromegaly   Lungs:   Respirations unlabored   Cardiovascular:   Rhythm is regular. S1 and S2 are normal. No significant murmur is present   Extremities: No cyanosis or clubbing   Skin: Skin is warm, dry, and otherwise intact   Neurologic: Gait not assessed. Mood and affect appropriate              Medical History  Surgical History Family History Social History   Past Medical History:   Diagnosis Date    Hypertension     Paroxysmal atrial fibrillation (H)     Past Surgical History:   Procedure Laterality Date    AS REPAIR/GRAFT ACHILLES TENDON Right 2011    benign ovarian tumor removal  1999    wisdom teeth      ZZC MOLE REMOVAL (LESIONS) TIER 1      Family History   Problem Relation Age of Onset    Heart Failure Mother     Emphysema Mother     Melanoma Mother     No Known Problems Father     Social History     Socioeconomic History    Marital status:      Spouse name: Not on file    Number of children: Not on file    Years of education: Not on file    Highest education level: Not on file   Occupational History    Not on file   Tobacco Use    Smoking status: " Never    Smokeless tobacco: Never   Vaping Use    Vaping status: Never Used   Substance and Sexual Activity    Alcohol use: Never    Drug use: Never    Sexual activity: Yes     Partners: Male   Other Topics Concern    Not on file   Social History Narrative    Not on file     Social Drivers of Health     Financial Resource Strain: Low Risk  (10/2/2024)    Financial Resource Strain     Within the past 12 months, have you or your family members you live with been unable to get utilities (heat, electricity) when it was really needed?: No   Food Insecurity: Low Risk  (10/2/2024)    Food Insecurity     Within the past 12 months, did you worry that your food would run out before you got money to buy more?: No     Within the past 12 months, did the food you bought just not last and you didn t have money to get more?: No   Transportation Needs: Low Risk  (10/2/2024)    Transportation Needs     Within the past 12 months, has lack of transportation kept you from medical appointments, getting your medicines, non-medical meetings or appointments, work, or from getting things that you need?: No   Physical Activity: Unknown (10/2/2024)    Exercise Vital Sign     Days of Exercise per Week: 5 days     Minutes of Exercise per Session: Not on file   Stress: No Stress Concern Present (10/2/2024)    [employer] - Occupational Stress Questionnaire     Feeling of Stress : Only a little   Social Connections: Unknown (10/2/2024)    Social Connection and Isolation Panel [NHANES]     Frequency of Communication with Friends and Family: Not on file     Frequency of Social Gatherings with Friends and Family: Twice a week     Attends Zoroastrian Services: Not on file     Active Member of Clubs or Organizations: Not on file     Attends Club or Organization Meetings: Not on file     Marital Status: Not on file   Interpersonal Safety: Low Risk  (10/2/2024)    Interpersonal Safety     Do you feel physically and emotionally  safe where you currently live?: Yes     Within the past 12 months, have you been hit, slapped, kicked or otherwise physically hurt by someone?: No     Within the past 12 months, have you been humiliated or emotionally abused in other ways by your partner or ex-partner?: No   Housing Stability: Low Risk  (10/2/2024)    Housing Stability     Do you have housing? : Yes     Are you worried about losing your housing?: No          Medications  Allergies   Scheduled Meds:  Current Outpatient Medications   Medication Sig Dispense Refill    apixaban ANTICOAGULANT (ELIQUIS ANTICOAGULANT) 5 MG tablet TAKE 1 TABLET BY MOUTH 2 TIMES DAILY 180 tablet 3    losartan-hydrochlorothiazide (HYZAAR) 100-25 MG tablet Take 1 tablet by mouth daily. 90 tablet 3    multivitamin, therapeutic (THERA-VIT) TABS tablet Take 1 tablet by mouth daily      rosuvastatin (CRESTOR) 5 MG tablet Take 1 tablet (5 mg) by mouth daily. 90 tablet 3    sotalol (BETAPACE) 80 MG tablet Take 1 tablet (80 mg) by mouth 2 times daily. 180 tablet 3    Allergies   Allergen Reactions    Lisinopril Cough    Perflutren Lipid Microsphere      Definity dye     Perflutren Lipid Microsphere [Perflutren Lipid Microspheres]      Severe back pain    Seasonal Allergies          Lab Results    Chemistry/lipid CBC Cardiac Enzymes/BNP/TSH/INR   Lab Results   Component Value Date    CHOL 192 10/02/2024    HDL 82 10/02/2024    TRIG 81 10/02/2024    BUN 17.0 10/02/2024     10/02/2024    CO2 28 10/02/2024    Lab Results   Component Value Date    WBC 6.5 10/02/2024    HGB 13.7 10/02/2024    HCT 42.6 10/02/2024    MCV 93 10/02/2024     10/02/2024    @RESUFAST(BMP,CBC,BNP,TSH,  INR)@      45 minutes spent reviewing prior records (including documentation, laboratory studies, cardiac testing/imaging), history and physical exam, planning, and subsequent documentation.     The longitudinal plan of care for the diagnosis(es)/condition(s) as documented were addressed during this  visit. Due to the added complexity in care, I will continue to support Kelly in the subsequent management and with ongoing continuity of care.      This note has been dictated using voice recognition software. Any grammatical, typographical, or context distortions are unintentional and inherent to the software.    Karma Dawson, CNP  Clinical Cardiac Electrophysiology  [facility]  Clinic and [phone]  Fax: [phone]  Electrophysiology Nurses: [phone]

## 2025-01-21 ENCOUNTER — OFFICE VISIT (OUTPATIENT)
Dept: CARDIOLOGY | Facility: CLINIC | Age: 75
End: 2025-01-21
Payer: COMMERCIAL

## 2025-01-21 ENCOUNTER — DOCUMENTATION ONLY (OUTPATIENT)
Dept: CARDIOLOGY | Facility: CLINIC | Age: 75
End: 2025-01-21

## 2025-01-21 ENCOUNTER — TELEPHONE (OUTPATIENT)
Dept: CARDIOLOGY | Facility: CLINIC | Age: 75
End: 2025-01-21

## 2025-01-21 VITALS
BODY MASS INDEX: 36 KG/M2 | SYSTOLIC BLOOD PRESSURE: 174 MMHG | HEART RATE: 68 BPM | WEIGHT: 224 LBS | RESPIRATION RATE: 12 BRPM | DIASTOLIC BLOOD PRESSURE: 74 MMHG | HEIGHT: 66 IN

## 2025-01-21 DIAGNOSIS — I48.0 PAROXYSMAL ATRIAL FIBRILLATION (H): Primary | ICD-10-CM

## 2025-01-21 PROCEDURE — 99215 OFFICE O/P EST HI 40 MIN: CPT | Performed by: NURSE PRACTITIONER

## 2025-01-21 PROCEDURE — G2211 COMPLEX E/M VISIT ADD ON: HCPCS | Performed by: NURSE PRACTITIONER

## 2025-01-21 NOTE — PROGRESS NOTES
PVI  Order Case Req Y  Order Set Y Imaging Order Echo     AC Eliquis-CONTINUE   AAD Sotalol-Hold 3 days prior   PPI/H2 Blocker Start Protonix 40mg Daily 3 days prior, 6wk post   Diuretics None   DM/GLP-1 DM Meds- None  GLP-1- None   Can plan for PVI, general anesthesia, continue apixaban, hold sotalol 3d prior with plan to resume for 6-12 weeks post ablation   Kelly Mckeon, 1950, [id number]  Home:[phone] (home) Cell:[phone] (mobile)  Emergency Contact: Miky Mckeon [phone]  PCP: Ayleen Castro, [phone]    Important patient information for CSC/Cath Lab staff : None    [facility] EP Cath Lab Procedure Order   Ablation Type:  Atrial Fibrillation (Paroxsymal)  Case Request: None  Ordering Provider: Dr Yfn Hickman Ordered and Prepped: 1/21/2025 Guadalupe Vanegas RN    Scheduling Information:  Anticipated Case Duration:  Standard ( Case per day SA 2:1, DW 5:1, KA 3:1)   Scheduling Timeframe:  Next Available  Scheduling Restrictions: None  Scheduling Contact: Please contact pt to schedule, if you are unable to schedule date within the next 24 hours please contact pt to update on scheduling process  EP RN Follow Up Apt: Schedule EP RN PC visit 3-4 days s/p PVI  MD Preference: Scheduling with ordering provider  Current Device/Device Co Needed for Procedure: None NoneNone  Pre-Procedural Testing needed: Echo  Mapping System Required:  Carto (Dr Coulter and Dr Thomas)  ICE Needed:  Yes  Anesthesia:General Anesthesia    [facility] EP Cath Lab Prep   H&P:  Schedule H&P with EP DINAH, RN Teach, and Labs within 30 days of PVI  Pre-op Labs: CBC, BMP, Beta HcG if appropriate, and INR if on Warfarin will be ordered AM of procedure, if not completed at pre-op H&P within 7 days of procedure.  T&S Pre-Procedure Review: Does not need for PVI procedures  Medical Records Pertinent for Procedure:  None  Iodinated Contrast Dye Allergies (Does not include Shellfish, Egg, and/or Iodine Allergy): NA  GLP-1 Protocol: If  on Dulaglutide (Trulicity) (weekly)- Injection hold 7 days prior to procedure  , Exenatide extended release (Bydureon bcise) (weekly)- Injection hold 7 days prior to procedure, Exenatide (Byetta) (twice daily)- Oral Tablet hold day prior and morning of procedure and for Injection hold 7 days prior to procedure, Semaglutide (Ozempic) (weekly)- Injection and Oral hold 7 days prior to procedure, Liraglutide (Victoza, Saxenda) (daily)- Injection hold day prior and morning of procedure  Follow Up S/P: EP RN 3-4 PC Visit and EP DINAH 6wk (To be scheduled at time of case scheduling)    Allergies   Allergen Reactions    Lisinopril Cough    Perflutren Lipid Microsphere      Definity dye     Perflutren Lipid Microsphere [Perflutren Lipid Microspheres]      Severe back pain    Seasonal Allergies        Current Outpatient Medications:     apixaban ANTICOAGULANT (ELIQUIS ANTICOAGULANT) 5 MG tablet, TAKE 1 TABLET BY MOUTH 2 TIMES DAILY, Disp: 180 tablet, Rfl: 3    losartan-hydrochlorothiazide (HYZAAR) 100-25 MG tablet, Take 1 tablet by mouth daily., Disp: 90 tablet, Rfl: 3    multivitamin, therapeutic (THERA-VIT) TABS tablet, Take 1 tablet by mouth daily, Disp: , Rfl:     rosuvastatin (CRESTOR) 5 MG tablet, Take 1 tablet (5 mg) by mouth daily., Disp: 90 tablet, Rfl: 3    sotalol (BETAPACE) 80 MG tablet, Take 1 tablet (80 mg) by mouth 2 times daily., Disp: 180 tablet, Rfl: 3    Documentation Date:1/21/2025 11:13 AM  Guadalupe Vanegas RN

## 2025-01-21 NOTE — TELEPHONE ENCOUNTER
Yfn, MD Samir Caballero Emily A, ASHOK CNP; P [facility] Ep [facility] Karma,    AF ablation sounds reasonable, though agree that she has had palpitations with correlation to AF as well as SR+PACs in the past.  We will hopefully be able to help with the AF.    Can plan for PVI, general anesthesia, continue apixaban, hold sotalol 3d prior with plan to resume for 6-12 weeks post ablation.  May let her know that AF will be targeted, though the PACs (3% on 7/2024 Zio) will likely continue.    TY!  Bret

## 2025-01-21 NOTE — Clinical Note
Kelly Adrian saw Dr. Coulter in October and elected for trial of sotalol but would now prefer AF ablation. Suspect overall AF burden continues to be low; she obtained a smart watch and has been tracking sx with recent tracings showing SR with PACs, no definitive AF. She had some initial side effects on sotalol which have improved though not resolved. Let me know if I can help further, thank you

## 2025-01-21 NOTE — TELEPHONE ENCOUNTER
Phone call to pt to discuss, she states understanding and would still like to move forward with PVI.    Will prep and send to scheduling.    She had some concerns regarding PACs and how to determine if its a PAC vs AFIB that she is feeling.  She states she has [employer] vannessa and a watch that she uses to monitor but doesn't now how to read the rhythms.  RN discussed that if she has concerns she can upload the rhythm to [employer] and we can review.  She would like tips sheet sent over regarding how to upload onto [employer].  Will send that via [employer] to her today for review.    No further questions or concerns at this time.    Guadalupe

## 2025-01-21 NOTE — TELEPHONE ENCOUNTER
Karma Dawson APRN CNP  P [facility] Ep Support [facility]; Guilherme Coulter MD Hi, Linda saw Dr. Coulter in October and elected for trial of sotalol but would now prefer AF ablation. Suspect overall AF burden continues to be low; she obtained a smart watch and has been tracking sx with recent tracings showing SR with PACs, no definitive AF. She had some initial side effects on sotalol which have improved though not resolved. Let me know if I can help further, thank you

## 2025-01-21 NOTE — LETTER
1/21/2025    Ayleen Castro, DO  [address] [facility] [address]    RE: Kelly Mckeon       Dear Colleague,     I had the pleasure of seeing Kelly Mckeon in the [facility].     [facility]  Cardiac Electrophysiology  [address]  [place] [address]   Office: [phone]  Fax: [phone]     HEART CARE ELECTROPHYSIOLOGY FOLLOW UP    Primary Care: Ayleen Castro MD      Assessment/Recommendations     Paroxysmal atrial fibrillation/stage 3A: Diagnosed 2021, symptomatic with tachypalpitations which have historically also correlated to sinus rhythm with atrial ectopy. She is hesitant to increase sotalol due to side effects and would prefer catheter ablation. We discussed ablation indications, procedure and risks, recovery, expected prognosis, and pulsed field ablation. We also discussed role of risk factor modification including hypertension and obesity as it pertains to long term arrhythmia management    [id number] score of 3 for age 65-74, gender, HTN; HAS BLED 1 for age >65. She denies missed doses or bleeding issues    HTN: elevated today, historically controlled     Plan:  Continue Eliquis 5 mg twice a day for stroke prophylaxis  Continue sotalol 80 mg twice a day     History of Present Illness/Subjective    Kelly Mckeon is a 74 year old female with past medical history significant for paroxysmal AF, HTN, dyslipidemia, obesity, seen today with her  for EP follow-up.  She underwent workup for intermittent palpitations and was diagnosed with paroxysmal AF by MCT in 2021, consisting of a single episode lasting around 2.5 hours.  Symptoms were initially well-controlled on metoprolol.  Ambulatory monitoring 7-8/2024 showed AF burden of <1% and paroxysms of SVT, with symptoms associated with sinus rhythm with atrial ectopy.  She was started on sotalol 80 mg twice a day October 2024.    Kelly reports initial sensation  that something wasn't quite right after starting sotalol associated with heart rates in the 40s and low 50s which has since improved. Her heart rates trend 50-60s at home. She has no lightheadedness, dizziness, presyncope or syncope. She has been keeping a log of atrial fibrillation episodes in conjunction with smart watch EKG tracings, noting symptoms primarily occur when she is lying in bed. They were more frequent in December, occurring multiple times per week. Recent tracings show sinus rhythm with atrial ectopy, She had a previous sleep study showing mild sleep apnea in 2022 and has since lost 10 pounds; repeating sleep study was recently discussed which she deferred.  She denies chest discomfort, dyspnea on exertion, pedal edema or orthopnea.     Data Review     Arrhythmia hx  Dx/date: Paroxysmal AF 2021 by MCT  Sx: Tachypalpitations, also associated with PACs   [id number]-PQUy/OAC: 3 for age 65-74, gender, HTN  OAC: apixaban  Rate control: Metoprolol  AAD: sotalol 10/2/2024-present  DCCV: None  Ablation: None    EKG 10/7/2024: SR 65 bpm, QRS 93 ms, QT/QTc 418/426 ms   2/2/2021: SR 67 bpm  Personally reviewed.     TTE 2021  1.Left ventricular size, wall motion and function are normal. The ejection  fraction is 60-65%, visualy hyperdynamic.  2.TAPSE is normal, which is consistent with normal right ventricular systolic  function.  3.No hemodynamically significant valvular abnormalities on 2D or color flow  imaging.  4.There is no comparison study available.    Zio monitoring from 7/27/2024 to 8/9/2024 (duration 13d 1h).  Predominant underlying rhythm was sinus rhythm, 47 to 125bpm, average 66bpm.  Brief run of atrial tachycardia noted- episode <2 minutes (reported as atrial fibrillation in prelim report).  There were no pauses of greater than 3 seconds.  Rare supraventricular ectopic beats (3%). 76 non sustained SVT runs occurred, the run with the fastest interval lasting 7 beats with a max rate of 197 bpm, the  "longest lasting 18 beats with an avg rate of 103 bpm  Rare premature ventricular contractions (<1%).  Symptom triggers correlated with sinus rhythm with PACs.    MCT x 25 days beginning 9/10/2021.  Predominant rhythm sinus average 70 bpm.  Paroxysmal AF present, single episode lasting 2.5 hours, average 97 bpm, range  bpm.  1 episode of SVT x 12 beats up to 167 bpm.  Symptom triggers correlated to AF, sinus rhythm with isolated PAC    I have reviewed and updated the patient's past medical history, allergy list and medication list.          Physical Examination   Vitals: BP (!) 174/74 (BP Location: Right arm, Patient Position: Sitting, Cuff Size: Adult Large)   Pulse 68   Resp 12   Ht 1.676 m (5' 6\")   Wt 101.6 kg (224 lb)   BMI 36.15 kg/m      BMI= Body mass index is 36.15 kg/m .    Wt Readings from Last 3 Encounters:   01/21/25 101.6 kg (224 lb)   10/29/24 98.4 kg (217 lb)   10/02/24 100.2 kg (221 lb)       General   Appearance:   Alert and oriented, no acute distress   HEENT:  Normocephalic and atraumatic   Neck: No JVP, carotid bruit or obvious thyromegaly   Lungs:   Respirations unlabored   Cardiovascular:   Rhythm is regular. S1 and S2 are normal. No significant murmur is present   Extremities: No cyanosis or clubbing   Skin: Skin is warm, dry, and otherwise intact   Neurologic: Gait not assessed. Mood and affect appropriate              Medical History  Surgical History Family History Social History   Past Medical History:   Diagnosis Date     Hypertension      Paroxysmal atrial fibrillation (H)     Past Surgical History:   Procedure Laterality Date     AS REPAIR/GRAFT ACHILLES TENDON Right 2011     benign ovarian tumor removal  1999     wisdom teeth       ZZC MOLE REMOVAL (LESIONS) TIER 1      Family History   Problem Relation Age of Onset     Heart Failure Mother      Emphysema Mother      Melanoma Mother      No Known Problems Father     Social History     Socioeconomic History     Marital " status:      Spouse name: Not on file     Number of children: Not on file     Years of education: Not on file     Highest education level: Not on file   Occupational History     Not on file   Tobacco Use     Smoking status: Never     Smokeless tobacco: Never   Vaping Use     Vaping status: Never Used   Substance and Sexual Activity     Alcohol use: Never     Drug use: Never     Sexual activity: Yes     Partners: Male   Other Topics Concern     Not on file   Social History Narrative     Not on file     Social Drivers of Health     Financial Resource Strain: Low Risk  (10/2/2024)    Financial Resource Strain      Within the past 12 months, have you or your family members you live with been unable to get utilities (heat, electricity) when it was really needed?: No   Food Insecurity: Low Risk  (10/2/2024)    Food Insecurity      Within the past 12 months, did you worry that your food would run out before you got money to buy more?: No      Within the past 12 months, did the food you bought just not last and you didn t have money to get more?: No   Transportation Needs: Low Risk  (10/2/2024)    Transportation Needs      Within the past 12 months, has lack of transportation kept you from medical appointments, getting your medicines, non-medical meetings or appointments, work, or from getting things that you need?: No   Physical Activity: Unknown (10/2/2024)    Exercise Vital Sign      Days of Exercise per Week: 5 days      Minutes of Exercise per Session: Not on file   Stress: No Stress Concern Present (10/2/2024)    [employer] [place] [employer] - Occupational Stress Questionnaire      Feeling of Stress : Only a little   Social Connections: Unknown (10/2/2024)    Social Connection and Isolation Panel [NHANES]      Frequency of Communication with Friends and Family: Not on file      Frequency of Social Gatherings with Friends and Family: Twice a week      Attends Hindu Services: Not on file       Active Member of Clubs or Organizations: Not on file      Attends Club or Organization Meetings: Not on file      Marital Status: Not on file   Interpersonal Safety: Low Risk  (10/2/2024)    Interpersonal Safety      Do you feel physically and emotionally safe where you currently live?: Yes      Within the past 12 months, have you been hit, slapped, kicked or otherwise physically hurt by someone?: No      Within the past 12 months, have you been humiliated or emotionally abused in other ways by your partner or ex-partner?: No   Housing Stability: Low Risk  (10/2/2024)    Housing Stability      Do you have housing? : Yes      Are you worried about losing your housing?: No          Medications  Allergies   Scheduled Meds:  Current Outpatient Medications   Medication Sig Dispense Refill     apixaban ANTICOAGULANT (ELIQUIS ANTICOAGULANT) 5 MG tablet TAKE 1 TABLET BY MOUTH 2 TIMES DAILY 180 tablet 3     losartan-hydrochlorothiazide (HYZAAR) 100-25 MG tablet Take 1 tablet by mouth daily. 90 tablet 3     multivitamin, therapeutic (THERA-VIT) TABS tablet Take 1 tablet by mouth daily       rosuvastatin (CRESTOR) 5 MG tablet Take 1 tablet (5 mg) by mouth daily. 90 tablet 3     sotalol (BETAPACE) 80 MG tablet Take 1 tablet (80 mg) by mouth 2 times daily. 180 tablet 3    Allergies   Allergen Reactions     Lisinopril Cough     Perflutren Lipid Microsphere      Definity dye      Perflutren Lipid Microsphere [Perflutren Lipid Microspheres]      Severe back pain     Seasonal Allergies          Lab Results    Chemistry/lipid CBC Cardiac Enzymes/BNP/TSH/INR   Lab Results   Component Value Date    CHOL 192 10/02/2024    HDL 82 10/02/2024    TRIG 81 10/02/2024    BUN 17.0 10/02/2024     10/02/2024    CO2 28 10/02/2024    Lab Results   Component Value Date    WBC 6.5 10/02/2024    HGB 13.7 10/02/2024    HCT 42.6 10/02/2024    MCV 93 10/02/2024     10/02/2024    @RESUFAST(BMP,CBC,BNP,TSH,  INR)@      45 minutes spent  reviewing prior records (including documentation, laboratory studies, cardiac testing/imaging), history and physical exam, planning, and subsequent documentation.     The longitudinal plan of care for the diagnosis(es)/condition(s) as documented were addressed during this visit. Due to the added complexity in care, I will continue to support Kelly in the subsequent management and with ongoing continuity of care.      This note has been dictated using voice recognition software. Any grammatical, typographical, or context distortions are unintentional and inherent to the software.    Karma Dawson CNP  Clinical Cardiac Electrophysiology  [facility]  Clinic and [phone]  Fax: [phone]  Electrophysiology Nurses: [phone]          Thank you for allowing me to participate in the care of your patient.      Sincerely,     ASHOK Pinon CNP     [facility]  cc:   ASHOK Pinon CNP  HEART [facility] VASCULAR [address]  [address] [place] [address]

## 2025-01-27 DIAGNOSIS — I48.0 PAROXYSMAL ATRIAL FIBRILLATION (H): Primary | ICD-10-CM

## 2025-01-31 PROCEDURE — 82274 ASSAY TEST FOR BLOOD FECAL: CPT | Performed by: INTERNAL MEDICINE

## 2025-02-03 ENCOUNTER — LAB (OUTPATIENT)
Dept: LAB | Facility: CLINIC | Age: 75
End: 2025-02-03
Payer: COMMERCIAL

## 2025-02-03 DIAGNOSIS — Z12.11 SPECIAL SCREENING FOR MALIGNANT NEOPLASMS, COLON: ICD-10-CM

## 2025-02-04 ENCOUNTER — TELEPHONE (OUTPATIENT)
Dept: FAMILY MEDICINE | Facility: CLINIC | Age: 75
End: 2025-02-04
Payer: COMMERCIAL

## 2025-02-04 LAB — HEMOCCULT STL QL IA: NEGATIVE

## 2025-02-04 NOTE — TELEPHONE ENCOUNTER
Patient Quality Outreach    Patient is due for the following:   Hypertension -  BP check    Action(s) Taken:   Schedule a nurse only visit for bp    Type of outreach:    Sent letter.    Questions for provider review:    None           Christina Mauro MA  Chart routed to .

## 2025-02-10 ENCOUNTER — ALLIED HEALTH/NURSE VISIT (OUTPATIENT)
Dept: CARDIOLOGY | Facility: CLINIC | Age: 75
End: 2025-02-10
Payer: COMMERCIAL

## 2025-02-10 ENCOUNTER — LAB (OUTPATIENT)
Dept: CARDIOLOGY | Facility: CLINIC | Age: 75
End: 2025-02-10
Payer: COMMERCIAL

## 2025-02-10 ENCOUNTER — OFFICE VISIT (OUTPATIENT)
Dept: CARDIOLOGY | Facility: CLINIC | Age: 75
End: 2025-02-10
Payer: COMMERCIAL

## 2025-02-10 VITALS
HEIGHT: 66 IN | RESPIRATION RATE: 16 BRPM | WEIGHT: 222.4 LBS | DIASTOLIC BLOOD PRESSURE: 80 MMHG | BODY MASS INDEX: 35.74 KG/M2 | SYSTOLIC BLOOD PRESSURE: 158 MMHG | HEART RATE: 56 BPM

## 2025-02-10 DIAGNOSIS — I10 ESSENTIAL HYPERTENSION: ICD-10-CM

## 2025-02-10 DIAGNOSIS — I48.0 PAROXYSMAL ATRIAL FIBRILLATION (H): Primary | ICD-10-CM

## 2025-02-10 DIAGNOSIS — I48.0 PAROXYSMAL ATRIAL FIBRILLATION (H): ICD-10-CM

## 2025-02-10 LAB
ANION GAP SERPL CALCULATED.3IONS-SCNC: 6 MMOL/L (ref 7–15)
BUN SERPL-MCNC: 16.4 MG/DL (ref 8–23)
CALCIUM SERPL-MCNC: 9 MG/DL (ref 8.8–10.4)
CHLORIDE SERPL-SCNC: 104 MMOL/L (ref 98–107)
CREAT SERPL-MCNC: 0.82 MG/DL (ref 0.51–0.95)
EGFRCR SERPLBLD CKD-EPI 2021: 75 ML/MIN/1.73M2
ERYTHROCYTE [DISTWIDTH] IN BLOOD BY AUTOMATED COUNT: 12.7 % (ref 10–15)
GLUCOSE SERPL-MCNC: 95 MG/DL (ref 70–99)
HCO3 SERPL-SCNC: 28 MMOL/L (ref 22–29)
HCT VFR BLD AUTO: 40.4 % (ref 35–47)
HGB BLD-MCNC: 13.6 G/DL (ref 11.7–15.7)
MCH RBC QN AUTO: 29.7 PG (ref 26.5–33)
MCHC RBC AUTO-ENTMCNC: 33.7 G/DL (ref 31.5–36.5)
MCV RBC AUTO: 88 FL (ref 78–100)
PLATELET # BLD AUTO: 230 10E3/UL (ref 150–450)
POTASSIUM SERPL-SCNC: 3.7 MMOL/L (ref 3.4–5.3)
RBC # BLD AUTO: 4.58 10E6/UL (ref 3.8–5.2)
SODIUM SERPL-SCNC: 138 MMOL/L (ref 135–145)
WBC # BLD AUTO: 6.5 10E3/UL (ref 4–11)

## 2025-02-10 PROCEDURE — 93000 ELECTROCARDIOGRAM COMPLETE: CPT | Performed by: INTERNAL MEDICINE

## 2025-02-10 PROCEDURE — 85027 COMPLETE CBC AUTOMATED: CPT

## 2025-02-10 PROCEDURE — 80048 BASIC METABOLIC PNL TOTAL CA: CPT

## 2025-02-10 PROCEDURE — G2211 COMPLEX E/M VISIT ADD ON: HCPCS | Performed by: NURSE PRACTITIONER

## 2025-02-10 PROCEDURE — 36415 COLL VENOUS BLD VENIPUNCTURE: CPT

## 2025-02-10 PROCEDURE — 99207 PR NO CHARGE NURSE ONLY: CPT

## 2025-02-10 PROCEDURE — 99214 OFFICE O/P EST MOD 30 MIN: CPT | Performed by: NURSE PRACTITIONER

## 2025-02-10 RX ORDER — PANTOPRAZOLE SODIUM 40 MG/1
40 TABLET, DELAYED RELEASE ORAL DAILY
Qty: 45 TABLET | Refills: 0 | Status: SHIPPED | OUTPATIENT
Start: 2025-02-10

## 2025-02-10 NOTE — LETTER
2/10/2025    Ayleen Castro, DO  [address] [facility] [address]    RE: Kelly Mckeon       Dear Colleague,     I had the pleasure of seeing Kelly Mckeon in the [facility].     [facility]  Cardiac Electrophysiology  [address]  [place] [address]   Office: [phone]  Fax: [phone]     HEART CARE ELECTROPHYSIOLOGY FOLLOW UP    Primary Care: Ayleen Castro MD      Assessment/Recommendations     Paroxysmal atrial fibrillation/stage 3A: Diagnosed 2021, symptomatic with tachypalpitations which have historically also correlated to sinus rhythm with atrial ectopy. She is hesitant to increase sotalol due to side effects and would prefer catheter ablation. We discussed pulmonary vein isolation ablation procedure including <1-2% risk for major complication, anticipated success rates, recovery and follow-up.  Medical and surgical history reviewed and updated. Current medications and allergies reviewed and updated as appropriate. No personal or family history of adverse reactions to anesthesia or abnormal bleeding with surgery    [id number] score of 3 for age 65-74, gender, HTN; HAS BLED 1 for age >65. She denies missed doses or bleeding issues    Plan:  Continue Eliquis 5 mg twice a day for stroke prophylaxis  Stop sotalol beginning 3 days prior to ablation  Start Protonix 40mg every day beginning 3 days prior to ablation and continuing for 6 weeks thereafter  EP follow-up 6 weeks post ablation     History of Present Illness/Subjective    Kelly Mckeon is a 74 year old female with past medical history significant for paroxysmal AF, HTN, dyslipidemia, mild sleep apnea, obesity, seen today with her  for EP follow-up.  She underwent workup for intermittent palpitations and was diagnosed with paroxysmal AF by MCT in 2021, consisting of a single episode lasting around 2.5 hours.  Symptoms were initially well-controlled on  metoprolol.  Ambulatory monitoring 7-8/2024 showed AF burden of <1% and paroxysms of SVT, with symptoms associated with sinus rhythm with atrial ectopy and she was started on sotalol 80 mg twice a day October 2024. She initially had some general malaise on sotalol associated with heart rates in the 40s and low 50s which has somewhat improved. She had a self-limiting episode over the past several weeks and has otherwise felt well. She denies chest discomfort, palpitations, dyspnea on exertion, lightheadedness/dizziness, pedal edema or syncope.      Data Review     Arrhythmia hx  Dx/date: Paroxysmal AF 2021 by MCT  Sx: Tachypalpitations, also associated with PACs   [id number]/OAC: 3 for age 65-74, gender, HTN  OAC: apixaban  Rate control: Metoprolol  AAD: sotalol 10/2/2024-present  DCCV: None  Ablation: None    EKG 10/7/2024: SR 65 bpm, QRS 93 ms, QT/QTc 418/426 ms   2/2/2021: SR 67 bpm  Personally reviewed.     TTE 2021  1.Left ventricular size, wall motion and function are normal. The ejection  fraction is 60-65%, visualy hyperdynamic.  2.TAPSE is normal, which is consistent with normal right ventricular systolic  function.  3.No hemodynamically significant valvular abnormalities on 2D or color flow  imaging.  4.There is no comparison study available.    Zio monitoring from 7/27/2024 to 8/9/2024 (duration 13d 1h).  Predominant underlying rhythm was sinus rhythm, 47 to 125bpm, average 66bpm.  Brief run of atrial tachycardia noted- episode <2 minutes (reported as atrial fibrillation in prelim report).  There were no pauses of greater than 3 seconds.  Rare supraventricular ectopic beats (3%). 76 non sustained SVT runs occurred, the run with the fastest interval lasting 7 beats with a max rate of 197 bpm, the longest lasting 18 beats with an avg rate of 103 bpm  Rare premature ventricular contractions (<1%).  Symptom triggers correlated with sinus rhythm with PACs.    MCT x 25 days beginning 9/10/2021.  Predominant  rhythm sinus average 70 bpm.  Paroxysmal AF present, single episode lasting 2.5 hours, average 97 bpm, range  bpm.  1 episode of SVT x 12 beats up to 167 bpm.  Symptom triggers correlated to AF, sinus rhythm with isolated PAC    I have reviewed and updated the patient's past medical history, allergy list and medication list.          Physical Examination   Vitals: There were no vitals taken for this visit.    BMI= There is no height or weight on file to calculate BMI.    Wt Readings from Last 3 Encounters:   01/21/25 101.6 kg (224 lb)   10/29/24 98.4 kg (217 lb)   10/02/24 100.2 kg (221 lb)       General   Appearance:   Alert and oriented, no acute distress   HEENT:  Normocephalic and atraumatic   Neck: No JVP, carotid bruit or obvious thyromegaly   Lungs:   Respirations unlabored   Cardiovascular:   Rhythm is regular. S1 and S2 are normal. No significant murmur is present. Lower extremities demonstrate no significant edema   Extremities: No cyanosis or clubbing   Skin: Skin is warm, dry, and otherwise intact   Neurologic: Gait not assessed. Mood and affect appropriate                 Medical History  Surgical History Family History Social History   Past Medical History:   Diagnosis Date     Hypertension      Paroxysmal atrial fibrillation (H)     Past Surgical History:   Procedure Laterality Date     AS REPAIR/GRAFT ACHILLES TENDON Right 2011     benign ovarian tumor removal  1999     wisdom teeth       ZZC MOLE REMOVAL (LESIONS) TIER 1      Family History   Problem Relation Age of Onset     Heart Failure Mother      Emphysema Mother      Melanoma Mother      No Known Problems Father     Social History     Socioeconomic History     Marital status:      Spouse name: Not on file     Number of children: Not on file     Years of education: Not on file     Highest education level: Not on file   Occupational History     Not on file   Tobacco Use     Smoking status: Never     Smokeless tobacco: Never   Vaping  Use     Vaping status: Never Used   Substance and Sexual Activity     Alcohol use: Never     Drug use: Never     Sexual activity: Yes     Partners: Male   Other Topics Concern     Not on file   Social History Narrative     Not on file     Social Drivers of Health     Financial Resource Strain: Low Risk  (10/2/2024)    Financial Resource Strain      Within the past 12 months, have you or your family members you live with been unable to get utilities (heat, electricity) when it was really needed?: No   Food Insecurity: Low Risk  (10/2/2024)    Food Insecurity      Within the past 12 months, did you worry that your food would run out before you got money to buy more?: No      Within the past 12 months, did the food you bought just not last and you didn t have money to get more?: No   Transportation Needs: Low Risk  (10/2/2024)    Transportation Needs      Within the past 12 months, has lack of transportation kept you from medical appointments, getting your medicines, non-medical meetings or appointments, work, or from getting things that you need?: No   Physical Activity: Unknown (10/2/2024)    Exercise Vital Sign      Days of Exercise per Week: 5 days      Minutes of Exercise per Session: Not on file   Stress: No Stress Concern Present (10/2/2024)    [employer] [place] [employer] - Occupational Stress Questionnaire      Feeling of Stress : Only a little   Social Connections: Unknown (10/2/2024)    Social Connection and Isolation Panel [NHANES]      Frequency of Communication with Friends and Family: Not on file      Frequency of Social Gatherings with Friends and Family: Twice a week      Attends Jehovah's witness Services: Not on file      Active Member of Clubs or Organizations: Not on file      Attends Club or Organization Meetings: Not on file      Marital Status: Not on file   Interpersonal Safety: Low Risk  (10/2/2024)    Interpersonal Safety      Do you feel physically and emotionally safe where you  currently live?: Yes      Within the past 12 months, have you been hit, slapped, kicked or otherwise physically hurt by someone?: No      Within the past 12 months, have you been humiliated or emotionally abused in other ways by your partner or ex-partner?: No   Housing Stability: Low Risk  (10/2/2024)    Housing Stability      Do you have housing? : Yes      Are you worried about losing your housing?: No          Medications  Allergies   Scheduled Meds:  Current Outpatient Medications   Medication Sig Dispense Refill     apixaban ANTICOAGULANT (ELIQUIS ANTICOAGULANT) 5 MG tablet TAKE 1 TABLET BY MOUTH 2 TIMES DAILY 180 tablet 3     losartan-hydrochlorothiazide (HYZAAR) 100-25 MG tablet Take 1 tablet by mouth daily. 90 tablet 3     multivitamin, therapeutic (THERA-VIT) TABS tablet Take 1 tablet by mouth daily       rosuvastatin (CRESTOR) 5 MG tablet Take 1 tablet (5 mg) by mouth daily. 90 tablet 3     sotalol (BETAPACE) 80 MG tablet Take 1 tablet (80 mg) by mouth 2 times daily. 180 tablet 3    Allergies   Allergen Reactions     Lisinopril Cough     Perflutren Lipid Microsphere      Definity dye      Perflutren Lipid Microsphere [Perflutren Lipid Microspheres]      Severe back pain     Seasonal Allergies          Lab Results    Chemistry/lipid CBC Cardiac Enzymes/BNP/TSH/INR   Lab Results   Component Value Date    CHOL 192 10/02/2024    HDL 82 10/02/2024    TRIG 81 10/02/2024    BUN 17.0 10/02/2024     10/02/2024    CO2 28 10/02/2024    Lab Results   Component Value Date    WBC 6.5 10/02/2024    HGB 13.7 10/02/2024    HCT 42.6 10/02/2024    MCV 93 10/02/2024     10/02/2024    @RESUFAST(BMP,CBC,BNP,TSH,  INR)@      30 minutes spent reviewing prior records (including documentation, laboratory studies, cardiac testing/imaging), history and physical exam, planning, and subsequent documentation.     The longitudinal plan of care for the diagnosis(es)/condition(s) as documented were addressed during this  visit. Due to the added complexity in care, I will continue to support Kelly in the subsequent management and with ongoing continuity of care.      This note has been dictated using voice recognition software. Any grammatical, typographical, or context distortions are unintentional and inherent to the software.    Karma Dawson CNP  Clinical Cardiac Electrophysiology  [facility]  Clinic and [phone]  Fax: [phone]  Electrophysiology Nurses: [phone]          Thank you for allowing me to participate in the care of your patient.      Sincerely,     ASHOK Pinon CNP     [facility]  cc:   No referring provider defined for this encounter.

## 2025-02-10 NOTE — PROGRESS NOTES
Pre-Procedure Pulmonary Vein Ablation (AF) Education    Procedure: PVI with Dr Thomas on 2/21 with arrival time 5:30am    COVID: Pt denies COVID like symptoms, and is aware if he/she develops COVID like symptoms they would need to complete an at home with a rapid antigen COVID test 1-2 days prior to your procedure date. If COVID + pt is aware the procedure will need to be rescheduled, and to contact CV scheduling as soon as possible    Type & Screen: Is not required for PVI Ablation    Pre-Op H&P: Completed today with EP DINAH- See record in Epic    Education:   Reviewed with pt in Clinic today  Pre-Procedure Instruction: NPO after midnight pre procedure, Defined NPO, Remove all jewelry and leave all valuables at home, Shower prior to arrival, Anesthesia and intubation plan/orders, Intra-procedure PVI process, Post- PVI procedure expectations/recovery, Transportation requirements and arrangements post procedure, Post-procedure follow up process, Letter sent to pt via [employer] and mail with written instructions (Refer to letters tab), Lab results would be called to pt if abnormal  Risks:   Atrial Fibrillation Ablation/Left Atrial Ablation  Cardiac Ablation  <1% Hypotension, Hemorrhage, Thrombophlebitis, Systemic or pulmonic emboli, Cardiac perforation (tamponade), Infection, Pneumothorax, Arrhythmias, Proarrhythmic effects of drugs, Radiation exposure, Catheter entrapment  <1 % Vascular injury including perforation of vein, artery or heart  1-2% Tamponade and Aortic puncture with left sided transeptal approach  1% CVA   <1% MI  <0.1% death  If external defibrillation or CV is needed, 25% risk for superficial burn  Risks associated with general anesthesia will be addressed by the Anesthesiology Department  Radiofrequency Risks:  In addition to standard risks for Radiofrequency Ablation, there is:  <2% Significant pulmonary vein stenosis  <2% Embolic events  <1% Esophageal fistula  <1% Phrenic nerve paralysis    Cryoablation Risks:  In addition to standard risks for Cryoablation Ablation, there is:  <1% Phrenic nerve paralysis  <1% Pulmonary vein stenosis  <1% Esophageal fistula    Medication:   Instructions regarding anticoagulants: Eliquis- Continue anticoagulation uninterrupted through their procedure, do not miss any doses of AC prior to procedure, importance of taking AC for stroke prevention, taking AC as prescribed, to call prior to PVI if missed a dose of AC, and if upon arrival pt reports missing a dose of AC PVI will potentially be cnx/postponed  Instructions given to pt regarding antiarrhythmic medication: Sotalol- Hold 3 days prior to procedure  Instructions given to pt regarding PPI medication: Start Protonix 40mg Daily 3 days prior, 6wk post  Instructions given to pt regarding diuretics medication: None  Instructions given to pt regarding DM/GLP-1 medication:   DM- None  GLP-1- None  Instructions for medication, other than anticoagulants and antiarrhythmics listed above, given to pt: Take all medication AM of procedure with small sips of water     Important patient information for staff: None    2/10/2025 11:17 AM  Ruth Johnson RN

## 2025-02-10 NOTE — H&P (VIEW-ONLY)
[facility]  Cardiac Electrophysiology  [address]  [place] [address]   Office: [phone]  Fax: [phone]     HEART CARE ELECTROPHYSIOLOGY FOLLOW UP    Primary Care: Ayleen Castro MD      Assessment/Recommendations     Paroxysmal atrial fibrillation/stage 3A: Diagnosed 2021, symptomatic with tachypalpitations which have historically also correlated to sinus rhythm with atrial ectopy. She is hesitant to increase sotalol due to side effects and would prefer catheter ablation. We discussed pulmonary vein isolation ablation procedure including <1-2% risk for major complication, anticipated success rates, recovery and follow-up.  Medical and surgical history reviewed and updated. Current medications and allergies reviewed and updated as appropriate. No personal or family history of adverse reactions to anesthesia or abnormal bleeding with surgery    [id number] score of 3 for age 65-74, gender, HTN; HAS BLED 1 for age >65. She denies missed doses or bleeding issues    Plan:  Continue Eliquis 5 mg twice a day for stroke prophylaxis  Stop sotalol beginning 3 days prior to ablation  Start Protonix 40mg every day beginning 3 days prior to ablation and continuing for 6 weeks thereafter  EP follow-up 6 weeks post ablation     History of Present Illness/Subjective    Kelly Mckeon is a 74 year old female with past medical history significant for paroxysmal AF, HTN, dyslipidemia, mild sleep apnea, obesity, seen today with her  for EP follow-up.  She underwent workup for intermittent palpitations and was diagnosed with paroxysmal AF by MCT in 2021, consisting of a single episode lasting around 2.5 hours.  Symptoms were initially well-controlled on metoprolol.  Ambulatory monitoring 7-8/2024 showed AF burden of <1% and paroxysms of SVT, with symptoms associated with sinus rhythm with atrial ectopy and she was started on sotalol 80 mg twice a day October 2024.  She initially had some general malaise on sotalol associated with heart rates in the 40s and low 50s which has somewhat improved. She had a self-limiting episode over the past several weeks and has otherwise felt well. She denies chest discomfort, palpitations, dyspnea on exertion, lightheadedness/dizziness, pedal edema or syncope.      Data Review     Arrhythmia hx  Dx/date: Paroxysmal AF 2021 by MCT  Sx: Tachypalpitations, also associated with PACs   [id number]/OAC: 3 for age 65-74, gender, HTN  OAC: apixaban  Rate control: Metoprolol  AAD: sotalol 10/2/2024-present  DCCV: None  Ablation: None    EKG 10/7/2024: SR 65 bpm, QRS 93 ms, QT/QTc 418/426 ms   2/2/2021: SR 67 bpm  Personally reviewed.     TTE 2021  1.Left ventricular size, wall motion and function are normal. The ejection  fraction is 60-65%, visualy hyperdynamic.  2.TAPSE is normal, which is consistent with normal right ventricular systolic  function.  3.No hemodynamically significant valvular abnormalities on 2D or color flow  imaging.  4.There is no comparison study available.    Zio monitoring from 7/27/2024 to 8/9/2024 (duration 13d 1h).  Predominant underlying rhythm was sinus rhythm, 47 to 125bpm, average 66bpm.  Brief run of atrial tachycardia noted- episode <2 minutes (reported as atrial fibrillation in prelim report).  There were no pauses of greater than 3 seconds.  Rare supraventricular ectopic beats (3%). 76 non sustained SVT runs occurred, the run with the fastest interval lasting 7 beats with a max rate of 197 bpm, the longest lasting 18 beats with an avg rate of 103 bpm  Rare premature ventricular contractions (<1%).  Symptom triggers correlated with sinus rhythm with PACs.    MCT x 25 days beginning 9/10/2021.  Predominant rhythm sinus average 70 bpm.  Paroxysmal AF present, single episode lasting 2.5 hours, average 97 bpm, range  bpm.  1 episode of SVT x 12 beats up to 167 bpm.  Symptom triggers correlated to AF, sinus rhythm  with isolated PAC    I have reviewed and updated the patient's past medical history, allergy list and medication list.          Physical Examination   Vitals: There were no vitals taken for this visit.    BMI= There is no height or weight on file to calculate BMI.    Wt Readings from Last 3 Encounters:   01/21/25 101.6 kg (224 lb)   10/29/24 98.4 kg (217 lb)   10/02/24 100.2 kg (221 lb)       General   Appearance:   Alert and oriented, no acute distress   HEENT:  Normocephalic and atraumatic   Neck: No JVP, carotid bruit or obvious thyromegaly   Lungs:   Respirations unlabored   Cardiovascular:   Rhythm is regular. S1 and S2 are normal. No significant murmur is present. Lower extremities demonstrate no significant edema   Extremities: No cyanosis or clubbing   Skin: Skin is warm, dry, and otherwise intact   Neurologic: Gait not assessed. Mood and affect appropriate                 Medical History  Surgical History Family History Social History   Past Medical History:   Diagnosis Date    Hypertension     Paroxysmal atrial fibrillation (H)     Past Surgical History:   Procedure Laterality Date    AS REPAIR/GRAFT ACHILLES TENDON Right 2011    benign ovarian tumor removal  1999    wisdom teeth      ZZC MOLE REMOVAL (LESIONS) TIER 1      Family History   Problem Relation Age of Onset    Heart Failure Mother     Emphysema Mother     Melanoma Mother     No Known Problems Father     Social History     Socioeconomic History    Marital status:      Spouse name: Not on file    Number of children: Not on file    Years of education: Not on file    Highest education level: Not on file   Occupational History    Not on file   Tobacco Use    Smoking status: Never    Smokeless tobacco: Never   Vaping Use    Vaping status: Never Used   Substance and Sexual Activity    Alcohol use: Never    Drug use: Never    Sexual activity: Yes     Partners: Male   Other Topics Concern    Not on file   Social History Narrative    Not on file      Social Drivers of Health     Financial Resource Strain: Low Risk  (10/2/2024)    Financial Resource Strain     Within the past 12 months, have you or your family members you live with been unable to get utilities (heat, electricity) when it was really needed?: No   Food Insecurity: Low Risk  (10/2/2024)    Food Insecurity     Within the past 12 months, did you worry that your food would run out before you got money to buy more?: No     Within the past 12 months, did the food you bought just not last and you didn t have money to get more?: No   Transportation Needs: Low Risk  (10/2/2024)    Transportation Needs     Within the past 12 months, has lack of transportation kept you from medical appointments, getting your medicines, non-medical meetings or appointments, work, or from getting things that you need?: No   Physical Activity: Unknown (10/2/2024)    Exercise Vital Sign     Days of Exercise per Week: 5 days     Minutes of Exercise per Session: Not on file   Stress: No Stress Concern Present (10/2/2024)    [place] [employer] - Occupational Stress Questionnaire     Feeling of Stress : Only a little   Social Connections: Unknown (10/2/2024)    Social Connection and Isolation Panel [NHANES]     Frequency of Communication with Friends and Family: Not on file     Frequency of Social Gatherings with Friends and Family: Twice a week     Attends Baptism Services: Not on file     Active Member of Clubs or Organizations: Not on file     Attends Club or Organization Meetings: Not on file     Marital Status: Not on file   Interpersonal Safety: Low Risk  (10/2/2024)    Interpersonal Safety     Do you feel physically and emotionally safe where you currently live?: Yes     Within the past 12 months, have you been hit, slapped, kicked or otherwise physically hurt by someone?: No     Within the past 12 months, have you been humiliated or emotionally abused in other ways by your partner or ex-partner?:  No   Housing Stability: Low Risk  (10/2/2024)    Housing Stability     Do you have housing? : Yes     Are you worried about losing your housing?: No          Medications  Allergies   Scheduled Meds:  Current Outpatient Medications   Medication Sig Dispense Refill    apixaban ANTICOAGULANT (ELIQUIS ANTICOAGULANT) 5 MG tablet TAKE 1 TABLET BY MOUTH 2 TIMES DAILY 180 tablet 3    losartan-hydrochlorothiazide (HYZAAR) 100-25 MG tablet Take 1 tablet by mouth daily. 90 tablet 3    multivitamin, therapeutic (THERA-VIT) TABS tablet Take 1 tablet by mouth daily      rosuvastatin (CRESTOR) 5 MG tablet Take 1 tablet (5 mg) by mouth daily. 90 tablet 3    sotalol (BETAPACE) 80 MG tablet Take 1 tablet (80 mg) by mouth 2 times daily. 180 tablet 3    Allergies   Allergen Reactions    Lisinopril Cough    Perflutren Lipid Microsphere      Definity dye     Perflutren Lipid Microsphere [Perflutren Lipid Microspheres]      Severe back pain    Seasonal Allergies          Lab Results    Chemistry/lipid CBC Cardiac Enzymes/BNP/TSH/INR   Lab Results   Component Value Date    CHOL 192 10/02/2024    HDL 82 10/02/2024    TRIG 81 10/02/2024    BUN 17.0 10/02/2024     10/02/2024    CO2 28 10/02/2024    Lab Results   Component Value Date    WBC 6.5 10/02/2024    HGB 13.7 10/02/2024    HCT 42.6 10/02/2024    MCV 93 10/02/2024     10/02/2024    @RESUFAST(BMP,CBC,BNP,TSH,  INR)@      30 minutes spent reviewing prior records (including documentation, laboratory studies, cardiac testing/imaging), history and physical exam, planning, and subsequent documentation.     The longitudinal plan of care for the diagnosis(es)/condition(s) as documented were addressed during this visit. Due to the added complexity in care, I will continue to support Kelly in the subsequent management and with ongoing continuity of care.      This note has been dictated using voice recognition software. Any grammatical, typographical, or context distortions are  unintentional and inherent to the software.    Karma Dawson CNP  Clinical Cardiac Electrophysiology  [facility]  Clinic and [phone]  Fax: [phone]  Electrophysiology Nurses: [phone]

## 2025-02-10 NOTE — PROGRESS NOTES
[facility] Heart Care  Cardiac Electrophysiology  [address]  [place] [address]   Office: [phone]  Fax: [phone]     HEART CARE ELECTROPHYSIOLOGY FOLLOW UP    Primary Care: Ayleen Castro MD      Assessment/Recommendations     Paroxysmal atrial fibrillation/stage 3A: Diagnosed 2021, symptomatic with tachypalpitations which have historically also correlated to sinus rhythm with atrial ectopy. She is hesitant to increase sotalol due to side effects and would prefer catheter ablation. We discussed pulmonary vein isolation ablation procedure including <1-2% risk for major complication, anticipated success rates, recovery and follow-up.  Medical and surgical history reviewed and updated. Current medications and allergies reviewed and updated as appropriate. No personal or family history of adverse reactions to anesthesia or abnormal bleeding with surgery    [id number] score of 3 for age 65-74, gender, HTN; HAS BLED 1 for age >65. She denies missed doses or bleeding issues    Plan:  Continue Eliquis 5 mg twice a day for stroke prophylaxis  Stop sotalol beginning 3 days prior to ablation  Start Protonix 40mg every day beginning 3 days prior to ablation and continuing for 6 weeks thereafter  EP follow-up 6 weeks post ablation     History of Present Illness/Subjective    Kelly Mckeon is a 74 year old female with past medical history significant for paroxysmal AF, HTN, dyslipidemia, mild sleep apnea, obesity, seen today with her  for EP follow-up.  She underwent workup for intermittent palpitations and was diagnosed with paroxysmal AF by MCT in 2021, consisting of a single episode lasting around 2.5 hours.  Symptoms were initially well-controlled on metoprolol.  Ambulatory monitoring 7-8/2024 showed AF burden of <1% and paroxysms of SVT, with symptoms associated with sinus rhythm with atrial ectopy and she was started on sotalol 80 mg twice a day October 2024.  She initially had some general malaise on sotalol associated with heart rates in the 40s and low 50s which has somewhat improved. She had a self-limiting episode over the past several weeks and has otherwise felt well. She denies chest discomfort, palpitations, dyspnea on exertion, lightheadedness/dizziness, pedal edema or syncope.      Data Review     Arrhythmia hx  Dx/date: Paroxysmal AF 2021 by MCT  Sx: Tachypalpitations, also associated with PACs   [id number]-AIOs/OAC: 3 for age 65-74, gender, HTN  OAC: apixaban  Rate control: Metoprolol  AAD: sotalol 10/2/2024-present  DCCV: None  Ablation: None    EKG 10/7/2024: SR 65 bpm, QRS 93 ms, QT/QTc 418/426 ms   2/2/2021: SR 67 bpm  Personally reviewed.     TTE 2021  1.Left ventricular size, wall motion and function are normal. The ejection  fraction is 60-65%, visualy hyperdynamic.  2.TAPSE is normal, which is consistent with normal right ventricular systolic  function.  3.No hemodynamically significant valvular abnormalities on 2D or color flow  imaging.  4.There is no comparison study available.    Zio monitoring from 7/27/2024 to 8/9/2024 (duration 13d 1h).  Predominant underlying rhythm was sinus rhythm, 47 to 125bpm, average 66bpm.  Brief run of atrial tachycardia noted- episode <2 minutes (reported as atrial fibrillation in prelim report).  There were no pauses of greater than 3 seconds.  Rare supraventricular ectopic beats (3%). 76 non sustained SVT runs occurred, the run with the fastest interval lasting 7 beats with a max rate of 197 bpm, the longest lasting 18 beats with an avg rate of 103 bpm  Rare premature ventricular contractions (<1%).  Symptom triggers correlated with sinus rhythm with PACs.    MCT x 25 days beginning 9/10/2021.  Predominant rhythm sinus average 70 bpm.  Paroxysmal AF present, single episode lasting 2.5 hours, average 97 bpm, range  bpm.  1 episode of SVT x 12 beats up to 167 bpm.  Symptom triggers correlated to AF, sinus rhythm  with isolated PAC    I have reviewed and updated the patient's past medical history, allergy list and medication list.          Physical Examination   Vitals: There were no vitals taken for this visit.    BMI= There is no height or weight on file to calculate BMI.    Wt Readings from Last 3 Encounters:   01/21/25 101.6 kg (224 lb)   10/29/24 98.4 kg (217 lb)   10/02/24 100.2 kg (221 lb)       General   Appearance:   Alert and oriented, no acute distress   HEENT:  Normocephalic and atraumatic   Neck: No JVP, carotid bruit or obvious thyromegaly   Lungs:   Respirations unlabored   Cardiovascular:   Rhythm is regular. S1 and S2 are normal. No significant murmur is present. Lower extremities demonstrate no significant edema   Extremities: No cyanosis or clubbing   Skin: Skin is warm, dry, and otherwise intact   Neurologic: Gait not assessed. Mood and affect appropriate                 Medical History  Surgical History Family History Social History   Past Medical History:   Diagnosis Date    Hypertension     Paroxysmal atrial fibrillation (H)     Past Surgical History:   Procedure Laterality Date    AS REPAIR/GRAFT ACHILLES TENDON Right 2011    benign ovarian tumor removal  1999    wisdom teeth      ZZC MOLE REMOVAL (LESIONS) TIER 1      Family History   Problem Relation Age of Onset    Heart Failure Mother     Emphysema Mother     Melanoma Mother     No Known Problems Father     Social History     Socioeconomic History    Marital status:      Spouse name: Not on file    Number of children: Not on file    Years of education: Not on file    Highest education level: Not on file   Occupational History    Not on file   Tobacco Use    Smoking status: Never    Smokeless tobacco: Never   Vaping Use    Vaping status: Never Used   Substance and Sexual Activity    Alcohol use: Never    Drug use: Never    Sexual activity: Yes     Partners: Male   Other Topics Concern    Not on file   Social History Narrative    Not on file      Social Drivers of Health     Financial Resource Strain: Low Risk  (10/2/2024)    Financial Resource Strain     Within the past 12 months, have you or your family members you live with been unable to get utilities (heat, electricity) when it was really needed?: No   Food Insecurity: Low Risk  (10/2/2024)    Food Insecurity     Within the past 12 months, did you worry that your food would run out before you got money to buy more?: No     Within the past 12 months, did the food you bought just not last and you didn t have money to get more?: No   Transportation Needs: Low Risk  (10/2/2024)    Transportation Needs     Within the past 12 months, has lack of transportation kept you from medical appointments, getting your medicines, non-medical meetings or appointments, work, or from getting things that you need?: No   Physical Activity: Unknown (10/2/2024)    Exercise Vital Sign     Days of Exercise per Week: 5 days     Minutes of Exercise per Session: Not on file   Stress: No Stress Concern Present (10/2/2024)    [employer] [place] [employer] - Occupational Stress Questionnaire     Feeling of Stress : Only a little   Social Connections: Unknown (10/2/2024)    Social Connection and Isolation Panel [NHANES]     Frequency of Communication with Friends and Family: Not on file     Frequency of Social Gatherings with Friends and Family: Twice a week     Attends [employer] Services: Not on file     Active Member of Clubs or Organizations: Not on file     Attends Club or Organization Meetings: Not on file     Marital Status: Not on file   Interpersonal Safety: Low Risk  (10/2/2024)    Interpersonal Safety     Do you feel physically and emotionally safe where you currently live?: Yes     Within the past 12 months, have you been hit, slapped, kicked or otherwise physically hurt by someone?: No     Within the past 12 months, have you been humiliated or emotionally abused in other ways by your partner or ex-partner?:  No   Housing Stability: Low Risk  (10/2/2024)    Housing Stability     Do you have housing? : Yes     Are you worried about losing your housing?: No          Medications  Allergies   Scheduled Meds:  Current Outpatient Medications   Medication Sig Dispense Refill    apixaban ANTICOAGULANT (ELIQUIS ANTICOAGULANT) 5 MG tablet TAKE 1 TABLET BY MOUTH 2 TIMES DAILY 180 tablet 3    losartan-hydrochlorothiazide (HYZAAR) 100-25 MG tablet Take 1 tablet by mouth daily. 90 tablet 3    multivitamin, therapeutic (THERA-VIT) TABS tablet Take 1 tablet by mouth daily      rosuvastatin (CRESTOR) 5 MG tablet Take 1 tablet (5 mg) by mouth daily. 90 tablet 3    sotalol (BETAPACE) 80 MG tablet Take 1 tablet (80 mg) by mouth 2 times daily. 180 tablet 3    Allergies   Allergen Reactions    Lisinopril Cough    Perflutren Lipid Microsphere      Definity dye     Perflutren Lipid Microsphere [Perflutren Lipid Microspheres]      Severe back pain    Seasonal Allergies          Lab Results    Chemistry/lipid CBC Cardiac Enzymes/BNP/TSH/INR   Lab Results   Component Value Date    CHOL 192 10/02/2024    HDL 82 10/02/2024    TRIG 81 10/02/2024    BUN 17.0 10/02/2024     10/02/2024    CO2 28 10/02/2024    Lab Results   Component Value Date    WBC 6.5 10/02/2024    HGB 13.7 10/02/2024    HCT 42.6 10/02/2024    MCV 93 10/02/2024     10/02/2024    @RESUFAST(BMP,CBC,BNP,TSH,  INR)@      30 minutes spent reviewing prior records (including documentation, laboratory studies, cardiac testing/imaging), history and physical exam, planning, and subsequent documentation.     The longitudinal plan of care for the diagnosis(es)/condition(s) as documented were addressed during this visit. Due to the added complexity in care, I will continue to support Kelly in the subsequent management and with ongoing continuity of care.      This note has been dictated using voice recognition software. Any grammatical, typographical, or context distortions are  unintentional and inherent to the software.    Karma Dawson CNP  Clinical Cardiac Electrophysiology  [facility]  Clinic and [phone]  Fax: [phone]  Electrophysiology Nurses: [phone]

## 2025-02-11 LAB
ATRIAL RATE - MUSE: 56 BPM
DIASTOLIC BLOOD PRESSURE - MUSE: NORMAL MMHG
INTERPRETATION ECG - MUSE: NORMAL
P AXIS - MUSE: 68 DEGREES
PR INTERVAL - MUSE: 186 MS
QRS DURATION - MUSE: 84 MS
QT - MUSE: 448 MS
QTC - MUSE: 432 MS
R AXIS - MUSE: 59 DEGREES
SYSTOLIC BLOOD PRESSURE - MUSE: NORMAL MMHG
T AXIS - MUSE: 38 DEGREES
VENTRICULAR RATE- MUSE: 56 BPM

## 2025-02-21 ENCOUNTER — HOSPITAL ENCOUNTER (OUTPATIENT)
Facility: HOSPITAL | Age: 75
Discharge: HOME | End: 2025-02-21
Attending: INTERNAL MEDICINE | Admitting: INTERNAL MEDICINE
Payer: COMMERCIAL

## 2025-02-21 VITALS
DIASTOLIC BLOOD PRESSURE: 81 MMHG | SYSTOLIC BLOOD PRESSURE: 136 MMHG | TEMPERATURE: 97.6 F | WEIGHT: 220 LBS | RESPIRATION RATE: 24 BRPM | BODY MASS INDEX: 35.36 KG/M2 | HEART RATE: 65 BPM | HEIGHT: 66 IN | OXYGEN SATURATION: 100 %

## 2025-02-21 DIAGNOSIS — I48.0 PAROXYSMAL ATRIAL FIBRILLATION (H): ICD-10-CM

## 2025-02-21 LAB
ACT BLD: 568 SECONDS (ref 74–150)
ACT BLD: >1000 SECONDS (ref 74–150)
ANION GAP SERPL CALCULATED.3IONS-SCNC: 12 MMOL/L (ref 7–15)
ATRIAL RATE - MUSE: 72 BPM
BUN SERPL-MCNC: 19.8 MG/DL (ref 8–23)
CALCIUM SERPL-MCNC: 9.3 MG/DL (ref 8.8–10.4)
CHLORIDE SERPL-SCNC: 102 MMOL/L (ref 98–107)
CREAT SERPL-MCNC: 0.91 MG/DL (ref 0.51–0.95)
DIASTOLIC BLOOD PRESSURE - MUSE: NORMAL MMHG
EGFRCR SERPLBLD CKD-EPI 2021: 66 ML/MIN/1.73M2
ERYTHROCYTE [DISTWIDTH] IN BLOOD BY AUTOMATED COUNT: 12.7 % (ref 10–15)
GLUCOSE SERPL-MCNC: 99 MG/DL (ref 70–99)
HCO3 SERPL-SCNC: 26 MMOL/L (ref 22–29)
HCT VFR BLD AUTO: 40.2 % (ref 35–47)
HGB BLD-MCNC: 14 G/DL (ref 11.7–15.7)
INTERPRETATION ECG - MUSE: NORMAL
MCH RBC QN AUTO: 30.8 PG (ref 26.5–33)
MCHC RBC AUTO-ENTMCNC: 34.8 G/DL (ref 31.5–36.5)
MCV RBC AUTO: 88 FL (ref 78–100)
P AXIS - MUSE: 72 DEGREES
PLATELET # BLD AUTO: 212 10E3/UL (ref 150–450)
POTASSIUM SERPL-SCNC: 3.2 MMOL/L (ref 3.4–5.3)
PR INTERVAL - MUSE: 150 MS
QRS DURATION - MUSE: 82 MS
QT - MUSE: 412 MS
QTC - MUSE: 451 MS
R AXIS - MUSE: 68 DEGREES
RBC # BLD AUTO: 4.55 10E6/UL (ref 3.8–5.2)
SODIUM SERPL-SCNC: 140 MMOL/L (ref 135–145)
SYSTOLIC BLOOD PRESSURE - MUSE: NORMAL MMHG
T AXIS - MUSE: 40 DEGREES
VENTRICULAR RATE- MUSE: 72 BPM
WBC # BLD AUTO: 5.9 10E3/UL (ref 4–11)

## 2025-02-21 PROCEDURE — 93656 COMPRE EP EVAL ABLTJ ATR FIB: CPT | Performed by: INTERNAL MEDICINE

## 2025-02-21 PROCEDURE — 999N000054 HC STATISTIC EKG NON-CHARGEABLE

## 2025-02-21 PROCEDURE — 93623 PRGRMD STIMJ&PACG IV RX NFS: CPT | Performed by: INTERNAL MEDICINE

## 2025-02-21 PROCEDURE — 250N000011 HC RX IP 250 OP 636: Performed by: INTERNAL MEDICINE

## 2025-02-21 PROCEDURE — C1732 CATH, EP, DIAG/ABL, 3D/VECT: HCPCS | Performed by: INTERNAL MEDICINE

## 2025-02-21 PROCEDURE — 710N000010 HC RECOVERY PHASE 1, LEVEL 2, PER MIN

## 2025-02-21 PROCEDURE — 85347 COAGULATION TIME ACTIVATED: CPT

## 2025-02-21 PROCEDURE — C1759 CATH, INTRA ECHOCARDIOGRAPHY: HCPCS | Performed by: INTERNAL MEDICINE

## 2025-02-21 PROCEDURE — 36415 COLL VENOUS BLD VENIPUNCTURE: CPT | Performed by: INTERNAL MEDICINE

## 2025-02-21 PROCEDURE — 93010 ELECTROCARDIOGRAM REPORT: CPT | Performed by: STUDENT IN AN ORGANIZED HEALTH CARE EDUCATION/TRAINING PROGRAM

## 2025-02-21 PROCEDURE — 250N000013 HC RX MED GY IP 250 OP 250 PS 637: Performed by: NURSE PRACTITIONER

## 2025-02-21 PROCEDURE — 370N000017 HC ANESTHESIA TECHNICAL FEE, PER MIN: Performed by: INTERNAL MEDICINE

## 2025-02-21 PROCEDURE — 250N000009 HC RX 250: Performed by: INTERNAL MEDICINE

## 2025-02-21 PROCEDURE — 85014 HEMATOCRIT: CPT | Performed by: INTERNAL MEDICINE

## 2025-02-21 PROCEDURE — 258N000003 HC RX IP 258 OP 636: Performed by: INTERNAL MEDICINE

## 2025-02-21 PROCEDURE — C1766 INTRO/SHEATH,STRBLE,NON-PEEL: HCPCS | Performed by: INTERNAL MEDICINE

## 2025-02-21 PROCEDURE — 93005 ELECTROCARDIOGRAM TRACING: CPT

## 2025-02-21 PROCEDURE — C1894 INTRO/SHEATH, NON-LASER: HCPCS | Performed by: INTERNAL MEDICINE

## 2025-02-21 PROCEDURE — 272N000001 HC OR GENERAL SUPPLY STERILE: Performed by: INTERNAL MEDICINE

## 2025-02-21 PROCEDURE — C1730 CATH, EP, 19 OR FEW ELECT: HCPCS | Performed by: INTERNAL MEDICINE

## 2025-02-21 PROCEDURE — 85048 AUTOMATED LEUKOCYTE COUNT: CPT | Performed by: INTERNAL MEDICINE

## 2025-02-21 PROCEDURE — C1733 CATH, EP, OTHR THAN COOL-TIP: HCPCS | Performed by: INTERNAL MEDICINE

## 2025-02-21 PROCEDURE — 93623 PRGRMD STIMJ&PACG IV RX NFS: CPT | Mod: 26 | Performed by: INTERNAL MEDICINE

## 2025-02-21 PROCEDURE — C1887 CATHETER, GUIDING: HCPCS | Performed by: INTERNAL MEDICINE

## 2025-02-21 PROCEDURE — C1769 GUIDE WIRE: HCPCS | Performed by: INTERNAL MEDICINE

## 2025-02-21 PROCEDURE — 80048 BASIC METABOLIC PNL TOTAL CA: CPT | Performed by: INTERNAL MEDICINE

## 2025-02-21 RX ORDER — HEPARIN SODIUM 1000 [USP'U]/ML
INJECTION, SOLUTION INTRAVENOUS; SUBCUTANEOUS
Status: DISCONTINUED | OUTPATIENT
Start: 2025-02-21 | End: 2025-02-21 | Stop reason: HOSPADM

## 2025-02-21 RX ORDER — FENTANYL CITRATE 50 UG/ML
25 INJECTION, SOLUTION INTRAMUSCULAR; INTRAVENOUS
Status: DISCONTINUED | OUTPATIENT
Start: 2025-02-21 | End: 2025-02-21 | Stop reason: HOSPADM

## 2025-02-21 RX ORDER — HEPARIN SODIUM 10000 [USP'U]/100ML
INJECTION, SOLUTION INTRAVENOUS CONTINUOUS PRN
Status: DISCONTINUED | OUTPATIENT
Start: 2025-02-21 | End: 2025-02-21 | Stop reason: HOSPADM

## 2025-02-21 RX ORDER — SODIUM CHLORIDE 9 MG/ML
100 INJECTION, SOLUTION INTRAVENOUS CONTINUOUS
Status: DISCONTINUED | OUTPATIENT
Start: 2025-02-21 | End: 2025-02-21 | Stop reason: HOSPADM

## 2025-02-21 RX ORDER — POTASSIUM CHLORIDE 1500 MG/1
40 TABLET, EXTENDED RELEASE ORAL ONCE
Status: COMPLETED | OUTPATIENT
Start: 2025-02-21 | End: 2025-02-21

## 2025-02-21 RX ORDER — LIDOCAINE 40 MG/G
CREAM TOPICAL
Status: DISCONTINUED | OUTPATIENT
Start: 2025-02-21 | End: 2025-02-21 | Stop reason: HOSPADM

## 2025-02-21 RX ORDER — ONDANSETRON 4 MG/1
4 TABLET, ORALLY DISINTEGRATING ORAL EVERY 6 HOURS PRN
Status: DISCONTINUED | OUTPATIENT
Start: 2025-02-21 | End: 2025-02-21 | Stop reason: HOSPADM

## 2025-02-21 RX ORDER — LIDOCAINE HYDROCHLORIDE AND EPINEPHRINE 10; 10 MG/ML; UG/ML
INJECTION, SOLUTION INFILTRATION; PERINEURAL
Status: DISCONTINUED | OUTPATIENT
Start: 2025-02-21 | End: 2025-02-21 | Stop reason: HOSPADM

## 2025-02-21 RX ORDER — BUPIVACAINE HYDROCHLORIDE 2.5 MG/ML
INJECTION, SOLUTION EPIDURAL; INFILTRATION; INTRACAUDAL
Status: DISCONTINUED | OUTPATIENT
Start: 2025-02-21 | End: 2025-02-21 | Stop reason: HOSPADM

## 2025-02-21 RX ORDER — ONDANSETRON 2 MG/ML
4 INJECTION INTRAMUSCULAR; INTRAVENOUS EVERY 6 HOURS PRN
Status: DISCONTINUED | OUTPATIENT
Start: 2025-02-21 | End: 2025-02-21 | Stop reason: HOSPADM

## 2025-02-21 RX ORDER — ADENOSINE 3 MG/ML
INJECTION, SOLUTION INTRAVENOUS
Status: DISCONTINUED | OUTPATIENT
Start: 2025-02-21 | End: 2025-02-21 | Stop reason: HOSPADM

## 2025-02-21 RX ORDER — ACETAMINOPHEN 325 MG/1
650 TABLET ORAL EVERY 4 HOURS PRN
Status: DISCONTINUED | OUTPATIENT
Start: 2025-02-21 | End: 2025-02-21 | Stop reason: HOSPADM

## 2025-02-21 RX ORDER — IBUPROFEN 600 MG/1
600 TABLET, FILM COATED ORAL EVERY 6 HOURS PRN
Status: DISCONTINUED | OUTPATIENT
Start: 2025-02-21 | End: 2025-02-21 | Stop reason: HOSPADM

## 2025-02-21 RX ADMIN — POTASSIUM CHLORIDE 40 MEQ: 1500 TABLET, EXTENDED RELEASE ORAL at 08:37

## 2025-02-21 RX ADMIN — SODIUM CHLORIDE 100 ML/HR: 0.9 INJECTION, SOLUTION INTRAVENOUS at 08:32

## 2025-02-21 ASSESSMENT — ACTIVITIES OF DAILY LIVING (ADL)
ADLS_ACUITY_SCORE: 41

## 2025-02-21 NOTE — Clinical Note
Potential access sites were evaluated for patency using ultrasound.   The left femoral vein and right fem vein was selected. Access was obtained under with Sonosite guidance using a micropuncture 21 gauge needle with direct visualization of needle entry.

## 2025-02-21 NOTE — CARE PLAN
Pt dc'ed to home via wheelchair, bilateral groin sites dry and intact with no hematoma noted, denies any pain.

## 2025-02-21 NOTE — PLAN OF CARE
Goal Outcome Evaluation:             Pt admitted for an ablation due to her hx of atrial fibrillation. Pt is on Eliquis with stated no missed doses. Pt denies pain. Pt prepped and ready for procedure.     Pt  Miky is here for support.      Karol Fitzgerald RN

## 2025-02-21 NOTE — Clinical Note
Arrhythmia Type: atrial fibrillation.   Method of Cardioversion: synchronous.   The arrhythmia was terminated.   Post cardioversion rhythm: sinus rhythm.

## 2025-02-21 NOTE — DISCHARGE INSTRUCTIONS
[facility] [place]  Cardiac Electrophysiology  [address]  [place] [address]   Office: [phone]  Fax: [phone]       Cardiac Electrophysiology - Post Ablation Discharge Instructions      PROCEDURE   Atrial fibrillation ablation         MEDICATION INSTRUCTIONS   Continue taking your prior to procedure medications, including sotalol for now - we will assess for stopping sotalol at your 6 week follow-up visit.  Continue taking your blood thinner apixaban (Eliquis).          DISCHARGE INSTRUCTIONS   Medications   Take your medications as prescribed.   It is important for you to continue your blood thinner without interruption, unless your electrophysiologist instructs you otherwise.     General instructions   Have an adult stay with you until tomorrow.   You may resume your normal diet.     You may shower tomorrow.  Do not take a bath, or use a hot tub or pool for at least 1 week.    Activity recommendations   Do not drive for 3 days.   Avoid stooping or squatting more than 90 degrees at the hips for 7 days.   Avoid repetitive motions such as loading , vacuuming, raking or shoveling for 7 days.   Avoid heavy lifting (greater than 25lbs) for 7 days.       Groin care instructions   For the first 24 hours after your ablation, check the groin access sites every 1-2 hours while awake.   You may keep a bandaid over the puncture sites for 1 or 2 days post-procedure and thereafter may keep these sites uncovered.  Change the bandaid daily.  If there is minor oozing, apply another bandaid and remove it after 12 hours.   For 2 days, when you cough, sneeze, laugh or move your bowels, hold your hand over the puncture sites and press firmly.   Do not scrub the groin access sites.   Do not use lotion or powder near the groin access sites.       Arrhythmias following ablation  Recurrent atrial fibrillation and palpitations are common within the first 3 months post ablation while  your heart recovers from the procedure.  These are usually more frequent in the first few weeks following ablation and should occur less frequently over time.  Please contact us if you are having frequent or long lasting atrial fibrillation episodes following ablation.    Common findings after ablation  Bruising and a dime or pea size lump at the access sites is common.  If you notice increased swelling, external bleeding, or have other concerns regarding your groin access sites please call your electrophysiology team's office, and if after hours consider emergency department evaluation.   Soreness and mild pain at your groin sites is normal, to help relieve this pain you can apply ice/cold packs to the sites for 20min 3-4 times per day.   Pleuritic chest discomfort (chest pain worse with taking deep breaths, worse with laying flat on your back) can occur after ablation, usually coming about within the first 24-72hrs post ablation.  If this occurs and is severe enough to be troublesome to you, please call us and consider starting a course of ibuprofen 400mg three times daily for 5 to 7 days.     Things to watch for   As with any type of procedure, please be more attentive to unusual symptoms post ablation (eg. fever, neurologic changes, pain with swallowing, loss of consciousness, etc) - we recommend ER evaluation for any such symptoms in the first few weeks post procedure.       Contact the EP Nurse line with any of the following.  Contact the cardiology on-call number after business hours.    Consider ER evaluation for severe symptoms.   Groin pain, swelling, or growing hard lump around the puncture sites.   Groin redness, tenderness, swelling, or drainage (blood or pus).   Neurological changes (for example: leg, arm or face weakness or numbness, difficulties with speech or word finding, problems walking or with your balance, vision changes).   Any numbness, coolness or changes in color in your  extremities.  Sudden onset severe abdominal or back pain.  Moderate or severe chest pain not relieved by Tylenol or Ibuprofen, particularly after the first 48 hours.   Shortness of breath.   Chills or fever greater than 100 F.   Difficulty swallowing food or liquids.  Coughing up blood.   Blood in your stool.  Nausea and vomiting.  Difficulty urinating.  Recurrent atrial fibrillation associated with prolonged rapid heart rates (for example, heart rates over 140bpm for greater than 4 hours) or associated with additional concerning symptoms (for example, chest pain, lightheadedness, loss of consciousness, sweating).     If you are being evaluated at an emergency department, please tell your ER doctor that you have recently underwent an atrial fibrillation ablation and ask the ER to contact our office.  Many special considerations apply following ablation - these may be overlooked during an ER evaluation.      Our office will have a follow-up visit scheduled for you in approximately 6 weeks.  Please do not hesitate to call us before that time should issues arise.         [facility]      To reach the EP nurses working with Dr. Coulter:   [phone]        To reach the general [facility] line or after hours service:   [phone]   If you are calling after hours, please listen to the entire voicemail,    a live  will answer at the end of the message.

## 2025-02-21 NOTE — INTERVAL H&P NOTE
"I have reviewed the surgical (or preoperative) H&P that is linked to this encounter, and examined the patient. There are no significant changes    Clinical Conditions Present on Arrival:  Clinically Significant Risk Factors Present on Admission        # Hypokalemia: Lowest K = 3.2 mmol/L in last 2 days, will replace as needed          # Drug Induced Coagulation Defect: home medication list includes an anticoagulant medication       # Obesity: Estimated body mass index is 35.51 kg/m  as calculated from the following:    Height as of this encounter: 1.676 m (5' 6\").    Weight as of this encounter: 99.8 kg (220 lb).       "

## 2025-02-21 NOTE — Clinical Note
Arrhythmia Type: atrial fibrillation.   Method of Cardioversion: synchronous.   The arrhythmia was terminated.   Energy shock delivered: 200 joules.   Time shock delivered: 10:15 CST.   Post cardioversion rhythm: sinus rhythm.   No early return to atrial fibrillation (ERAF). No immediate return to atrial fibrillation (IRAF).

## 2025-02-25 ENCOUNTER — VIRTUAL VISIT (OUTPATIENT)
Dept: CARDIOLOGY | Facility: CLINIC | Age: 75
End: 2025-02-25
Payer: COMMERCIAL

## 2025-02-25 DIAGNOSIS — I48.0 PAROXYSMAL ATRIAL FIBRILLATION (H): Primary | ICD-10-CM

## 2025-02-25 DIAGNOSIS — Z98.890 STATUS POST ABLATION OF ATRIAL FIBRILLATION: ICD-10-CM

## 2025-02-25 DIAGNOSIS — Z86.79 STATUS POST ABLATION OF ATRIAL FIBRILLATION: ICD-10-CM

## 2025-02-25 PROCEDURE — 99207 PR NO CHARGE NURSE ONLY: CPT | Mod: 93

## 2025-02-25 NOTE — PROGRESS NOTES
Pt is s/p PVI PO day 4, today 2/25/2025 , PVI was completed on 2-21-25 with Dr. Coulter and pt was discharged the same day.  PC was placed to pt, spoke to pt    General Assessment:     Weight: Pt reports pt is unable to report weight, but denies s/s of fluid retention    Vital signs:  Pt reports normal heart rates and blood pressure and Pt has been afebrile.    Pain: Pt denies generalized or localized pain abnormal to healing s/p     /GI: Pt denies difficulty swallowing, denies heart burn, denies constipation, denies urinary retention/difficulty, reports normal appetite, and reports staying hydrated.    Respiratory: Pt denies SOB and denies any further symptoms abnormal to normal healing process s/p PVI.    Activity: Pt is tolerating advancement in activity while following physical restrictions.     Neurological:  Pt denies vision changes, changes in speech, changes in balance, and changes in strength or motor function.    Rhythm Assessment:   Pt denies irregularities in HR or rhythm and denies symptoms or sustained AF episodes.    Procedure Site Assessment:   Pt reports some bruising around sites without significant change from hospital discharge and normal to PVI recovery    Anticoagulation/Medication:  Pt remain on Eliquis without interruption  Pt  confirms taking pantoprazole and sotalol as directed    Education completed with pt at this visit:  Reviewed normal post-op PVI healing process, when to contact EP-RN/EP-MD, contact information was given to the pt for further concerns or questions, after hours contact was reviewed with patient, and pt verbalized understanding    Follow up:  AVS mailed to patient and is available through my chart.  Pt Does have a 6 week follow up scheduled with Karma Dawson on 4-4-25 .    2/25/2025 9:28 AM  Jovita Mcgovern RN

## 2025-02-27 ENCOUNTER — MYC MEDICAL ADVICE (OUTPATIENT)
Dept: FAMILY MEDICINE | Facility: CLINIC | Age: 75
End: 2025-02-27
Payer: COMMERCIAL

## 2025-02-27 NOTE — TELEPHONE ENCOUNTER
Patient Quality Outreach    Patient is due for the following:   Hypertension -  BP check    Action(s) Taken:   Message sent to patient  Here is here reponse:    I did action on prior bp request messages sent out by informing my Dr. Castro that I have had it checked by [facility] team for my heart ablation of Feb 21st. She replied that she will let them monitor it for now. I am also taking my bp at home and it is normal.  Thanks for following up.   Type of outreach:    Sent [employer] message.    Questions for provider review:    None           Brianna Carlos, CMA

## 2025-05-12 ENCOUNTER — OFFICE VISIT (OUTPATIENT)
Dept: CARDIOLOGY | Facility: CLINIC | Age: 75
End: 2025-05-12
Payer: COMMERCIAL

## 2025-05-12 VITALS
DIASTOLIC BLOOD PRESSURE: 86 MMHG | BODY MASS INDEX: 35.99 KG/M2 | SYSTOLIC BLOOD PRESSURE: 138 MMHG | HEART RATE: 60 BPM | WEIGHT: 223 LBS | RESPIRATION RATE: 14 BRPM

## 2025-05-12 DIAGNOSIS — I48.0 PAROXYSMAL ATRIAL FIBRILLATION (H): Primary | ICD-10-CM

## 2025-05-12 DIAGNOSIS — Z98.890 STATUS POST CATHETER ABLATION OF ATRIAL FIBRILLATION: ICD-10-CM

## 2025-05-12 PROCEDURE — G2211 COMPLEX E/M VISIT ADD ON: HCPCS | Performed by: NURSE PRACTITIONER

## 2025-05-12 PROCEDURE — 99213 OFFICE O/P EST LOW 20 MIN: CPT | Performed by: NURSE PRACTITIONER

## 2025-05-12 PROCEDURE — 3075F SYST BP GE 130 - 139MM HG: CPT | Performed by: NURSE PRACTITIONER

## 2025-05-12 PROCEDURE — 3079F DIAST BP 80-89 MM HG: CPT | Performed by: NURSE PRACTITIONER

## 2025-05-12 NOTE — PROGRESS NOTES
[facility]  Cardiac Electrophysiology  [address]  [place] [address]   Office: [phone]  Fax: [phone]     HEART CARE ELECTROPHYSIOLOGY FOLLOW UP    Primary Care: Ayleen Castro MD      Assessment/Recommendations     Paroxysmal atrial fibrillation/stage 3A: Diagnosed 2021, symptomatic with tachypalpitations which have historically also correlated to sinus rhythm with atrial ectopy.  Status post PFA PVI 2/21/2025.  Unremarkable recovery without symptomatology or documentation of recurrent arrhythmia    [id number] score of 3 for age 65-74, gender, HTN; HAS BLED 1 for age >65. She denies missed doses or bleeding issues    Plan:  Continue Eliquis 5 mg twice a day for stroke prophylaxis  Taper and discontinue sotalol  14d Zio ~3 months post ablation  Follow-up pending results      History of Present Illness/Subjective    Kelly Mckeon is a 74 year old female with past medical history significant for paroxysmal AF, HTN, dyslipidemia, mild sleep apnea, obesity, seen today with her  for EP follow-up post ablation.  She underwent workup for intermittent palpitations and was diagnosed with paroxysmal AF by MCT in 2021, consisting of a single episode lasting around 2.5 hours.  Symptoms were initially well-controlled on metoprolol.  Ambulatory monitoring 7-8/2024 showed AF burden of <1% and paroxysms of SVT, with symptoms associated with sinus rhythm with atrial ectopy; she was started on sotalol 80 mg twice a day October 2024. She initially had some general malaise on sotalol associated with heart rates in the 40s and low 50s which somewhat improved. Due to side effects on sotalol further limiting medical therapy she underwent PFA PVI 2/21/2025.     Kelly has felt well over the past several weeks with no further tachypalpitations which she describes as a prolonged fluttering sensation in her chest.  She continues to endorse occasional skipped beats  though these have also lessened. She has resumed all activity as prior to ablation though her stamina has been slow to improve. She had extensive groin site bruising which is nearly gone. She had no trouble swallowing or neurologic changes post ablation.  She denies chest discomfort, dyspnea on exertion, lightheadedness/dizziness, pedal edema or syncope.     Data Review     Arrhythmia hx  Dx/date: Paroxysmal AF 2021 by MCT  Sx: Tachypalpitations, also associated with PACs   [id number]/OAC: 3 for age 65-74, gender, HTN  OAC: apixaban  Rate control: Metoprolol  AAD: sotalol 10/2/2024-present, initial general malaise and sinus bradycardia   DCCV: None  Ablation: PFA PVI 2/21/2025 (Dr. Coulter)    EKG 2/21/2025: SR with atrial ectopy 72 bpm, QRS 82 ms, QT/QTc 412/451 ms  2/10/2025: SB 56 bpm, QRS 84 ms, QT/QTc 448/432 ms   10/7/2024: SR 65 bpm, QRS 93 ms, QT/QTc 418/426 ms   2/2/2021: SR 67 bpm  Personally reviewed.     TTE 2021  1.Left ventricular size, wall motion and function are normal. The ejection  fraction is 60-65%, visualy hyperdynamic.  2.TAPSE is normal, which is consistent with normal right ventricular systolic  function.  3.No hemodynamically significant valvular abnormalities on 2D or color flow  imaging.  4.There is no comparison study available.    Zio monitoring from 7/27/2024 to 8/9/2024 (duration 13d 1h).  Predominant underlying rhythm was sinus rhythm, 47 to 125bpm, average 66bpm.  Brief run of atrial tachycardia noted- episode <2 minutes (reported as atrial fibrillation in prelim report).  There were no pauses of greater than 3 seconds.  Rare supraventricular ectopic beats (3%). 76 non sustained SVT runs occurred, the run with the fastest interval lasting 7 beats with a max rate of 197 bpm, the longest lasting 18 beats with an avg rate of 103 bpm  Rare premature ventricular contractions (<1%).  Symptom triggers correlated with sinus rhythm with PACs.    MCT x 25 days beginning 9/10/2021.   Predominant rhythm sinus average 70 bpm.  Paroxysmal AF present, single episode lasting 2.5 hours, average 97 bpm, range  bpm.  1 episode of SVT x 12 beats up to 167 bpm.  Symptom triggers correlated to AF, sinus rhythm with isolated PAC    I have reviewed and updated the patient's past medical history, allergy list and medication list.          Physical Examination   Vitals: /86 (BP Location: Right arm, Patient Position: Sitting, Cuff Size: Adult Large)   Pulse 60   Resp 14   Wt 101.2 kg (223 lb)   BMI 35.99 kg/m      BMI= Body mass index is 35.99 kg/m .    Wt Readings from Last 3 Encounters:   05/12/25 101.2 kg (223 lb)   02/21/25 99.8 kg (220 lb)   02/10/25 100.9 kg (222 lb 6.4 oz)       General   Appearance:   Alert and oriented, no acute distress   HEENT:  Normocephalic and atraumatic   Neck: No JVP, carotid bruit or obvious thyromegaly   Lungs:   Respirations unlabored   Cardiovascular:   Rhythm is regular. S1 and S2 are normal. No significant murmur is present. Lower extremities demonstrate no significant edema   Extremities: No cyanosis or clubbing   Skin: Skin is warm, dry, and otherwise intact   Neurologic: Gait not assessed. Mood and affect appropriate           Medical History  Surgical History Family History Social History   Past Medical History:   Diagnosis Date    Hypertension     Paroxysmal atrial fibrillation (H)     Past Surgical History:   Procedure Laterality Date    AS REPAIR/GRAFT ACHILLES TENDON Right 2011    benign ovarian tumor removal  1999    EP ABLATION PULMONARY VEIN ISOLATION N/A 2/21/2025    Procedure: Ablation Atrial Fibrillation;  Surgeon: Guilherme Coulter MD;  Location: [facility] CV    wisdom teeth      ZZC MOLE REMOVAL (LESIONS) TIER 1      Family History   Problem Relation Age of Onset    Heart Failure Mother     Emphysema Mother     Melanoma Mother     No Known Problems Father     Social History     Socioeconomic History    Marital status:       Spouse name: Not on file    Number of children: Not on file    Years of education: Not on file    Highest education level: Not on file   Occupational History    Not on file   Tobacco Use    Smoking status: Never    Smokeless tobacco: Never   Vaping Use    Vaping status: Never Used   Substance and Sexual Activity    Alcohol use: Never    Drug use: Never    Sexual activity: Yes     Partners: Male   Other Topics Concern    Not on file   Social History Narrative    Not on file     Social Drivers of Health     Financial Resource Strain: Low Risk  (10/2/2024)    Financial Resource Strain     Within the past 12 months, have you or your family members you live with been unable to get utilities (heat, electricity) when it was really needed?: No   Food Insecurity: Low Risk  (10/2/2024)    Food Insecurity     Within the past 12 months, did you worry that your food would run out before you got money to buy more?: No     Within the past 12 months, did the food you bought just not last and you didn t have money to get more?: No   Transportation Needs: Low Risk  (10/2/2024)    Transportation Needs     Within the past 12 months, has lack of transportation kept you from medical appointments, getting your medicines, non-medical meetings or appointments, work, or from getting things that you need?: No   Physical Activity: Unknown (10/2/2024)    Exercise Vital Sign     Days of Exercise per Week: 5 days     Minutes of Exercise per Session: Not on file   Stress: No Stress Concern Present (10/2/2024)    [employer] [place] [employer] - Occupational Stress Questionnaire     Feeling of Stress : Only a little   Social Connections: Unknown (10/2/2024)    Social Connection and Isolation Panel [NHANES]     Frequency of Communication with Friends and Family: Not on file     Frequency of Social Gatherings with Friends and Family: Twice a week     Attends [employer] Services: Not on file     Active Member of Clubs or Organizations:  Not on file     Attends Club or Organization Meetings: Not on file     Marital Status: Not on file   Interpersonal Safety: Low Risk  (2/21/2025)    Interpersonal Safety     Do you feel physically and emotionally safe where you currently live?: Yes     Within the past 12 months, have you been hit, slapped, kicked or otherwise physically hurt by someone?: No     Within the past 12 months, have you been humiliated or emotionally abused in other ways by your partner or ex-partner?: No   Housing Stability: Low Risk  (10/2/2024)    Housing Stability     Do you have housing? : Yes     Are you worried about losing your housing?: No          Medications  Allergies   Scheduled Meds:  Current Outpatient Medications   Medication Sig Dispense Refill    apixaban ANTICOAGULANT (ELIQUIS ANTICOAGULANT) 5 MG tablet TAKE 1 TABLET BY MOUTH 2 TIMES DAILY 180 tablet 3    losartan-hydrochlorothiazide (HYZAAR) 100-25 MG tablet Take 1 tablet by mouth daily. 90 tablet 3    multivitamin, therapeutic (THERA-VIT) TABS tablet Take 1 tablet by mouth daily      rosuvastatin (CRESTOR) 5 MG tablet Take 1 tablet (5 mg) by mouth daily. 90 tablet 3    sotalol (BETAPACE) 80 MG tablet Take 1 tablet (80 mg) by mouth 2 times daily. 180 tablet 3    Allergies   Allergen Reactions    Lisinopril Cough    Perflutren Lipid Microsphere      Definity dye     Perflutren Lipid Microsphere [Perflutren Lipid Microspheres]      Severe back pain    Seasonal Allergies          Lab Results    Chemistry/lipid CBC Cardiac Enzymes/BNP/TSH/INR   Lab Results   Component Value Date    CHOL 192 10/02/2024    HDL 82 10/02/2024    TRIG 81 10/02/2024    BUN 19.8 02/21/2025     02/21/2025    CO2 26 02/21/2025    Lab Results   Component Value Date    WBC 5.9 02/21/2025    HGB 14.0 02/21/2025    HCT 40.2 02/21/2025    MCV 88 02/21/2025     02/21/2025    @RESUFAST(BMP,CBC,BNP,TSH,  INR)@      25 minutes spent reviewing prior records (including documentation, laboratory  studies, cardiac testing/imaging), history and physical exam, planning, and subsequent documentation.     The longitudinal plan of care for the diagnosis(es)/condition(s) as documented were addressed during this visit. Due to the added complexity in care, I will continue to support Kelly in the subsequent management and with ongoing continuity of care.      This note has been dictated using voice recognition software. Any grammatical, typographical, or context distortions are unintentional and inherent to the software.    Karma Dawson, CNP  Clinical Cardiac Electrophysiology  [facility]  Clinic and [phone]  Fax: [phone]  Electrophysiology Nurses: [phone]

## 2025-05-12 NOTE — LETTER
5/12/2025    Ayleen Castro, DO  [address] [facility] [address]    RE: Kelly Mckeon       Dear Colleague,     I had the pleasure of seeing Kelly Mckeon in the [facility].     [facility]  Cardiac Electrophysiology  [address]  [place] [address]   Office: [phone]  Fax: [phone]     HEART CARE ELECTROPHYSIOLOGY FOLLOW UP    Primary Care: Ayleen Castro MD      Assessment/Recommendations     Paroxysmal atrial fibrillation/stage 3A: Diagnosed 2021, symptomatic with tachypalpitations which have historically also correlated to sinus rhythm with atrial ectopy.  Status post PFA PVI 2/21/2025.  Unremarkable recovery without symptomatology or documentation of recurrent arrhythmia    [id number] score of 3 for age 65-74, gender, HTN; HAS BLED 1 for age >65. She denies missed doses or bleeding issues    Plan:  Continue Eliquis 5 mg twice a day for stroke prophylaxis  Taper and discontinue sotalol  14d Zio ~3 months post ablation  Follow-up pending results      History of Present Illness/Subjective    Kelly Mckeon is a 74 year old female with past medical history significant for paroxysmal AF, HTN, dyslipidemia, mild sleep apnea, obesity, seen today with her  for EP follow-up post ablation.  She underwent workup for intermittent palpitations and was diagnosed with paroxysmal AF by MCT in 2021, consisting of a single episode lasting around 2.5 hours.  Symptoms were initially well-controlled on metoprolol.  Ambulatory monitoring 7-8/2024 showed AF burden of <1% and paroxysms of SVT, with symptoms associated with sinus rhythm with atrial ectopy; she was started on sotalol 80 mg twice a day October 2024. She initially had some general malaise on sotalol associated with heart rates in the 40s and low 50s which somewhat improved. Due to side effects on sotalol further limiting medical therapy she underwent PFA PVI  2/21/2025.     Kelly has felt well over the past several weeks with no further tachypalpitations which she describes as a prolonged fluttering sensation in her chest.  She continues to endorse occasional skipped beats though these have also lessened. She has resumed all activity as prior to ablation though her stamina has been slow to improve. She had extensive groin site bruising which is nearly gone. She had no trouble swallowing or neurologic changes post ablation.  She denies chest discomfort, dyspnea on exertion, lightheadedness/dizziness, pedal edema or syncope.     Data Review     Arrhythmia hx  Dx/date: Paroxysmal AF 2021 by MCT  Sx: Tachypalpitations, also associated with PACs   [id number]/OAC: 3 for age 65-74, gender, HTN  OAC: apixaban  Rate control: Metoprolol  AAD: sotalol 10/2/2024-present, initial general malaise and sinus bradycardia   DCCV: None  Ablation: PFA PVI 2/21/2025 (Dr. Coulter)    EKG 2/21/2025: SR with atrial ectopy 72 bpm, QRS 82 ms, QT/QTc 412/451 ms  2/10/2025: SB 56 bpm, QRS 84 ms, QT/QTc 448/432 ms   10/7/2024: SR 65 bpm, QRS 93 ms, QT/QTc 418/426 ms   2/2/2021: SR 67 bpm  Personally reviewed.     TTE 2021  1.Left ventricular size, wall motion and function are normal. The ejection  fraction is 60-65%, visualy hyperdynamic.  2.TAPSE is normal, which is consistent with normal right ventricular systolic  function.  3.No hemodynamically significant valvular abnormalities on 2D or color flow  imaging.  4.There is no comparison study available.    Zio monitoring from 7/27/2024 to 8/9/2024 (duration 13d 1h).  Predominant underlying rhythm was sinus rhythm, 47 to 125bpm, average 66bpm.  Brief run of atrial tachycardia noted- episode <2 minutes (reported as atrial fibrillation in prelim report).  There were no pauses of greater than 3 seconds.  Rare supraventricular ectopic beats (3%). 76 non sustained SVT runs occurred, the run with the fastest interval lasting 7 beats with a max rate of  197 bpm, the longest lasting 18 beats with an avg rate of 103 bpm  Rare premature ventricular contractions (<1%).  Symptom triggers correlated with sinus rhythm with PACs.    MCT x 25 days beginning 9/10/2021.  Predominant rhythm sinus average 70 bpm.  Paroxysmal AF present, single episode lasting 2.5 hours, average 97 bpm, range  bpm.  1 episode of SVT x 12 beats up to 167 bpm.  Symptom triggers correlated to AF, sinus rhythm with isolated PAC    I have reviewed and updated the patient's past medical history, allergy list and medication list.          Physical Examination   Vitals: /86 (BP Location: Right arm, Patient Position: Sitting, Cuff Size: Adult Large)   Pulse 60   Resp 14   Wt 101.2 kg (223 lb)   BMI 35.99 kg/m      BMI= Body mass index is 35.99 kg/m .    Wt Readings from Last 3 Encounters:   05/12/25 101.2 kg (223 lb)   02/21/25 99.8 kg (220 lb)   02/10/25 100.9 kg (222 lb 6.4 oz)       General   Appearance:   Alert and oriented, no acute distress   HEENT:  Normocephalic and atraumatic   Neck: No JVP, carotid bruit or obvious thyromegaly   Lungs:   Respirations unlabored   Cardiovascular:   Rhythm is regular. S1 and S2 are normal. No significant murmur is present. Lower extremities demonstrate no significant edema   Extremities: No cyanosis or clubbing   Skin: Skin is warm, dry, and otherwise intact   Neurologic: Gait not assessed. Mood and affect appropriate           Medical History  Surgical History Family History Social History   Past Medical History:   Diagnosis Date     Hypertension      Paroxysmal atrial fibrillation (H)     Past Surgical History:   Procedure Laterality Date     AS REPAIR/GRAFT ACHILLES TENDON Right 2011     benign ovarian tumor removal  1999     EP ABLATION PULMONARY VEIN ISOLATION N/A 2/21/2025    Procedure: Ablation Atrial Fibrillation;  Surgeon: Guilherme Coulter MD;  Location: [facility]     wisdom teeth       ZZC MOLE REMOVAL (LESIONS) TIER 1       Family History   Problem Relation Age of Onset     Heart Failure Mother      Emphysema Mother      Melanoma Mother      No Known Problems Father     Social History     Socioeconomic History     Marital status:      Spouse name: Not on file     Number of children: Not on file     Years of education: Not on file     Highest education level: Not on file   Occupational History     Not on file   Tobacco Use     Smoking status: Never     Smokeless tobacco: Never   Vaping Use     Vaping status: Never Used   Substance and Sexual Activity     Alcohol use: Never     Drug use: Never     Sexual activity: Yes     Partners: Male   Other Topics Concern     Not on file   Social History Narrative     Not on file     Social Drivers of Health     Financial Resource Strain: Low Risk  (10/2/2024)    Financial Resource Strain      Within the past 12 months, have you or your family members you live with been unable to get utilities (heat, electricity) when it was really needed?: No   Food Insecurity: Low Risk  (10/2/2024)    Food Insecurity      Within the past 12 months, did you worry that your food would run out before you got money to buy more?: No      Within the past 12 months, did the food you bought just not last and you didn t have money to get more?: No   Transportation Needs: Low Risk  (10/2/2024)    Transportation Needs      Within the past 12 months, has lack of transportation kept you from medical appointments, getting your medicines, non-medical meetings or appointments, work, or from getting things that you need?: No   Physical Activity: Unknown (10/2/2024)    Exercise Vital Sign      Days of Exercise per Week: 5 days      Minutes of Exercise per Session: Not on file   Stress: No Stress Concern Present (10/2/2024)    [employer] - Occupational Stress Questionnaire      Feeling of Stress : Only a little   Social Connections: Unknown (10/2/2024)    Social Connection and Isolation Panel  [NHANES]      Frequency of Communication with Friends and Family: Not on file      Frequency of Social Gatherings with Friends and Family: Twice a week      Attends Hoahaoism Services: Not on file      Active Member of Clubs or Organizations: Not on file      Attends Club or Organization Meetings: Not on file      Marital Status: Not on file   Interpersonal Safety: Low Risk  (2/21/2025)    Interpersonal Safety      Do you feel physically and emotionally safe where you currently live?: Yes      Within the past 12 months, have you been hit, slapped, kicked or otherwise physically hurt by someone?: No      Within the past 12 months, have you been humiliated or emotionally abused in other ways by your partner or ex-partner?: No   Housing Stability: Low Risk  (10/2/2024)    Housing Stability      Do you have housing? : Yes      Are you worried about losing your housing?: No          Medications  Allergies   Scheduled Meds:  Current Outpatient Medications   Medication Sig Dispense Refill     apixaban ANTICOAGULANT (ELIQUIS ANTICOAGULANT) 5 MG tablet TAKE 1 TABLET BY MOUTH 2 TIMES DAILY 180 tablet 3     losartan-hydrochlorothiazide (HYZAAR) 100-25 MG tablet Take 1 tablet by mouth daily. 90 tablet 3     multivitamin, therapeutic (THERA-VIT) TABS tablet Take 1 tablet by mouth daily       rosuvastatin (CRESTOR) 5 MG tablet Take 1 tablet (5 mg) by mouth daily. 90 tablet 3     sotalol (BETAPACE) 80 MG tablet Take 1 tablet (80 mg) by mouth 2 times daily. 180 tablet 3    Allergies   Allergen Reactions     Lisinopril Cough     Perflutren Lipid Microsphere      Definity dye      Perflutren Lipid Microsphere [Perflutren Lipid Microspheres]      Severe back pain     Seasonal Allergies          Lab Results    Chemistry/lipid CBC Cardiac Enzymes/BNP/TSH/INR   Lab Results   Component Value Date    CHOL 192 10/02/2024    HDL 82 10/02/2024    TRIG 81 10/02/2024    BUN 19.8 02/21/2025     02/21/2025    CO2 26 02/21/2025    Lab  Results   Component Value Date    WBC 5.9 2025    HGB 14.0 2025    HCT 40.2 2025    MCV 88 2025     2025    @RESUFAST(BMP,CBC,BNP,TSH,  INR)@      25 minutes spent reviewing prior records (including documentation, laboratory studies, cardiac testing/imaging), history and physical exam, planning, and subsequent documentation.     The longitudinal plan of care for the diagnosis(es)/condition(s) as documented were addressed during this visit. Due to the added complexity in care, I will continue to support Kelly in the subsequent management and with ongoing continuity of care.      This note has been dictated using voice recognition software. Any grammatical, typographical, or context distortions are unintentional and inherent to the software.    Karma Dawson CNP  Clinical Cardiac Electrophysiology  [facility]  Clinic and [phone]  Fax: [phone]  Electrophysiology Nurses: [phone]        Thank you for allowing me to participate in the care of your patient.      Sincerely,     ASOHK Cabrales CNP     [facility]  cc:   ASHOK Pinon CNP  [address] [facility] [address]  [place] [address]

## 2025-06-24 ENCOUNTER — RESULTS FOLLOW-UP (OUTPATIENT)
Dept: CARDIOLOGY | Facility: CLINIC | Age: 75
End: 2025-06-24
Payer: COMMERCIAL

## 2025-06-26 NOTE — TELEPHONE ENCOUNTER
Spoke with pt and discussed feedback from provider. [facility] verbalized understanding [facility] to discuss further with Karma.     Christina Jacobs RN

## 2025-09-03 ENCOUNTER — OFFICE VISIT (OUTPATIENT)
Dept: CARDIOLOGY | Facility: CLINIC | Age: 75
End: 2025-09-03
Payer: COMMERCIAL

## 2025-09-03 VITALS
BODY MASS INDEX: 35.36 KG/M2 | SYSTOLIC BLOOD PRESSURE: 132 MMHG | DIASTOLIC BLOOD PRESSURE: 82 MMHG | HEART RATE: 60 BPM | RESPIRATION RATE: 16 BRPM | HEIGHT: 66 IN | WEIGHT: 220 LBS

## 2025-09-03 DIAGNOSIS — I48.0 PAROXYSMAL ATRIAL FIBRILLATION (H): Primary | ICD-10-CM

## 2025-09-03 PROCEDURE — 99214 OFFICE O/P EST MOD 30 MIN: CPT | Performed by: INTERNAL MEDICINE

## 2025-09-03 PROCEDURE — G2211 COMPLEX E/M VISIT ADD ON: HCPCS | Performed by: INTERNAL MEDICINE

## 2025-09-03 PROCEDURE — 3075F SYST BP GE 130 - 139MM HG: CPT | Performed by: INTERNAL MEDICINE

## 2025-09-03 PROCEDURE — 3079F DIAST BP 80-89 MM HG: CPT | Performed by: INTERNAL MEDICINE

## 2025-09-04 ENCOUNTER — OFFICE VISIT (OUTPATIENT)
Dept: FAMILY MEDICINE | Facility: CLINIC | Age: 75
End: 2025-09-04
Payer: COMMERCIAL

## 2025-09-04 VITALS
HEART RATE: 68 BPM | HEIGHT: 66 IN | BODY MASS INDEX: 35.36 KG/M2 | WEIGHT: 220 LBS | DIASTOLIC BLOOD PRESSURE: 80 MMHG | OXYGEN SATURATION: 98 % | RESPIRATION RATE: 18 BRPM | TEMPERATURE: 97.6 F | SYSTOLIC BLOOD PRESSURE: 128 MMHG

## 2025-09-04 DIAGNOSIS — R21 RASH: Primary | ICD-10-CM

## 2025-09-04 RX ORDER — DOXYCYCLINE HYCLATE 100 MG
100 TABLET ORAL 2 TIMES DAILY
Qty: 20 TABLET | Refills: 0 | Status: SHIPPED | OUTPATIENT
Start: 2025-09-04 | End: 2025-09-14

## 2025-09-04 RX ORDER — TRIAMCINOLONE ACETONIDE 1 MG/G
CREAM TOPICAL 2 TIMES DAILY
Qty: 45 G | Refills: 0 | Status: SHIPPED | OUTPATIENT
Start: 2025-09-04 | End: 2025-09-11

## 2025-09-04 ASSESSMENT — PAIN SCALES - GENERAL: PAINLEVEL_OUTOF10: MILD PAIN (3)

## (undated) DEVICE — 300CM, CATHETER CONNECTION CABLE

## (undated) DEVICE — GUIDE WIRE SHEATH VERSACROSS D1 CURVE 93CM L180 VXAK0041

## (undated) DEVICE — INTRODUCER CHECK FLO 18FRX30CM .038 RCFW-18.0P-38-30-RB

## (undated) DEVICE — ELECTRODE DEFIB CADENCE 22550R

## (undated) DEVICE — CUSTOM PACK EP

## (undated) DEVICE — Device

## (undated) DEVICE — PULSED FIELD ABLATION CATHETER, 31MM

## (undated) DEVICE — TRANSDUCER TRAY ARTERIAL 42646-06

## (undated) DEVICE — CATH EP 7FR X 115CM DECANAV CA

## (undated) DEVICE — STEERABLE SHEATH CLEAR, 13F

## (undated) DEVICE — INTRO TERUMO 8FRX25CM W/MARKER RSB803

## (undated) DEVICE — PATCH CARTO 3 EXTERNAL REFERENCE 3D MAPPING CREFP6

## (undated) DEVICE — CATHETER OCTARAY LONG SPLINE CURVE F 3-3-3-3-3 D160906

## (undated) DEVICE — 8F SOUNDSTAR ECO ULTRASOUND CATHETER

## (undated) DEVICE — SHEATH PINNACLE 9FR 10CM W/MARKER

## (undated) RX ORDER — POTASSIUM CHLORIDE 1500 MG/1
TABLET, EXTENDED RELEASE ORAL
Status: DISPENSED
Start: 2025-02-21

## (undated) RX ORDER — FENTANYL CITRATE 50 UG/ML
INJECTION, SOLUTION INTRAMUSCULAR; INTRAVENOUS
Status: DISPENSED
Start: 2025-02-21